# Patient Record
Sex: FEMALE | Race: WHITE | NOT HISPANIC OR LATINO | Employment: OTHER | URBAN - METROPOLITAN AREA
[De-identification: names, ages, dates, MRNs, and addresses within clinical notes are randomized per-mention and may not be internally consistent; named-entity substitution may affect disease eponyms.]

---

## 2018-08-21 ENCOUNTER — HOSPITAL ENCOUNTER (INPATIENT)
Facility: HOSPITAL | Age: 83
LOS: 3 days | Discharge: HOME | End: 2018-08-24
Attending: EMERGENCY MEDICINE | Admitting: HOSPITALIST

## 2018-08-21 ENCOUNTER — APPOINTMENT (OUTPATIENT)
Dept: GENERAL RADIOLOGY | Facility: HOSPITAL | Age: 83
End: 2018-08-21

## 2018-08-21 ENCOUNTER — APPOINTMENT (OUTPATIENT)
Dept: CT IMAGING | Facility: HOSPITAL | Age: 83
End: 2018-08-21

## 2018-08-21 DIAGNOSIS — R29.90 STROKE-LIKE SYMPTOMS: Primary | ICD-10-CM

## 2018-08-21 LAB
ALBUMIN SERPL-MCNC: 4.5 G/DL (ref 3.5–5.2)
ALBUMIN/GLOB SERPL: 1.8 G/DL
ALP SERPL-CCNC: 131 U/L (ref 39–117)
ALT SERPL W P-5'-P-CCNC: 12 U/L (ref 1–33)
ANION GAP SERPL CALCULATED.3IONS-SCNC: 13.8 MMOL/L
AST SERPL-CCNC: 19 U/L (ref 1–32)
BACTERIA UR QL AUTO: NORMAL /HPF
BASOPHILS # BLD AUTO: 0.01 10*3/MM3 (ref 0–0.2)
BASOPHILS NFR BLD AUTO: 0.2 % (ref 0–1.5)
BILIRUB SERPL-MCNC: 0.6 MG/DL (ref 0.1–1.2)
BILIRUB UR QL STRIP: NEGATIVE
BUN BLD-MCNC: 5 MG/DL (ref 8–23)
BUN/CREAT SERPL: 6.8 (ref 7–25)
CALCIUM SPEC-SCNC: 9.5 MG/DL (ref 8.6–10.5)
CHLORIDE SERPL-SCNC: 101 MMOL/L (ref 98–107)
CLARITY UR: CLEAR
CO2 SERPL-SCNC: 24.2 MMOL/L (ref 22–29)
COLOR UR: YELLOW
CREAT BLD-MCNC: 0.74 MG/DL (ref 0.57–1)
DEPRECATED RDW RBC AUTO: 42.8 FL (ref 37–54)
EOSINOPHIL # BLD AUTO: 0.01 10*3/MM3 (ref 0–0.7)
EOSINOPHIL NFR BLD AUTO: 0.2 % (ref 0.3–6.2)
ERYTHROCYTE [DISTWIDTH] IN BLOOD BY AUTOMATED COUNT: 12.7 % (ref 11.7–13)
GFR SERPL CREATININE-BSD FRML MDRD: 75 ML/MIN/1.73
GLOBULIN UR ELPH-MCNC: 2.5 GM/DL
GLUCOSE BLD-MCNC: 92 MG/DL (ref 65–99)
GLUCOSE UR STRIP-MCNC: NEGATIVE MG/DL
HCT VFR BLD AUTO: 41.5 % (ref 35.6–45.5)
HGB BLD-MCNC: 13.7 G/DL (ref 11.9–15.5)
HGB UR QL STRIP.AUTO: NEGATIVE
HYALINE CASTS UR QL AUTO: NORMAL /LPF
IMM GRANULOCYTES # BLD: 0.01 10*3/MM3 (ref 0–0.03)
IMM GRANULOCYTES NFR BLD: 0.2 % (ref 0–0.5)
KETONES UR QL STRIP: ABNORMAL
LEUKOCYTE ESTERASE UR QL STRIP.AUTO: ABNORMAL
LYMPHOCYTES # BLD AUTO: 1.52 10*3/MM3 (ref 0.9–4.8)
LYMPHOCYTES NFR BLD AUTO: 23.9 % (ref 19.6–45.3)
MCH RBC QN AUTO: 30.3 PG (ref 26.9–32)
MCHC RBC AUTO-ENTMCNC: 33 G/DL (ref 32.4–36.3)
MCV RBC AUTO: 91.8 FL (ref 80.5–98.2)
MONOCYTES # BLD AUTO: 0.61 10*3/MM3 (ref 0.2–1.2)
MONOCYTES NFR BLD AUTO: 9.6 % (ref 5–12)
NEUTROPHILS # BLD AUTO: 4.21 10*3/MM3 (ref 1.9–8.1)
NEUTROPHILS NFR BLD AUTO: 66.1 % (ref 42.7–76)
NITRITE UR QL STRIP: NEGATIVE
NT-PROBNP SERPL-MCNC: 161.6 PG/ML (ref 0–1800)
PH UR STRIP.AUTO: 6.5 [PH] (ref 5–8)
PLATELET # BLD AUTO: 261 10*3/MM3 (ref 140–500)
PMV BLD AUTO: 10.6 FL (ref 6–12)
POTASSIUM BLD-SCNC: 4.2 MMOL/L (ref 3.5–5.2)
PROT SERPL-MCNC: 7 G/DL (ref 6–8.5)
PROT UR QL STRIP: NEGATIVE
RBC # BLD AUTO: 4.52 10*6/MM3 (ref 3.9–5.2)
RBC # UR: NORMAL /HPF
REF LAB TEST METHOD: NORMAL
SODIUM BLD-SCNC: 139 MMOL/L (ref 136–145)
SP GR UR STRIP: <=1.005 (ref 1–1.03)
SQUAMOUS #/AREA URNS HPF: NORMAL /HPF
TROPONIN T SERPL-MCNC: <0.01 NG/ML (ref 0–0.03)
UROBILINOGEN UR QL STRIP: ABNORMAL
WBC NRBC COR # BLD: 6.36 10*3/MM3 (ref 4.5–10.7)
WBC UR QL AUTO: NORMAL /HPF

## 2018-08-21 PROCEDURE — 72125 CT NECK SPINE W/O DYE: CPT

## 2018-08-21 PROCEDURE — G0378 HOSPITAL OBSERVATION PER HR: HCPCS

## 2018-08-21 PROCEDURE — 93005 ELECTROCARDIOGRAM TRACING: CPT | Performed by: NURSE PRACTITIONER

## 2018-08-21 PROCEDURE — 80053 COMPREHEN METABOLIC PANEL: CPT | Performed by: NURSE PRACTITIONER

## 2018-08-21 PROCEDURE — 81001 URINALYSIS AUTO W/SCOPE: CPT | Performed by: NURSE PRACTITIONER

## 2018-08-21 PROCEDURE — 93010 ELECTROCARDIOGRAM REPORT: CPT | Performed by: INTERNAL MEDICINE

## 2018-08-21 PROCEDURE — 85025 COMPLETE CBC W/AUTO DIFF WBC: CPT | Performed by: NURSE PRACTITIONER

## 2018-08-21 PROCEDURE — 70450 CT HEAD/BRAIN W/O DYE: CPT

## 2018-08-21 PROCEDURE — 99284 EMERGENCY DEPT VISIT MOD MDM: CPT

## 2018-08-21 PROCEDURE — 83880 ASSAY OF NATRIURETIC PEPTIDE: CPT | Performed by: NURSE PRACTITIONER

## 2018-08-21 PROCEDURE — 84484 ASSAY OF TROPONIN QUANT: CPT | Performed by: NURSE PRACTITIONER

## 2018-08-21 PROCEDURE — 71046 X-RAY EXAM CHEST 2 VIEWS: CPT

## 2018-08-21 RX ORDER — PANTOPRAZOLE SODIUM 40 MG/1
40 TABLET, DELAYED RELEASE ORAL
Status: DISCONTINUED | OUTPATIENT
Start: 2018-08-22 | End: 2018-08-24 | Stop reason: HOSPADM

## 2018-08-21 RX ORDER — ATORVASTATIN CALCIUM 80 MG/1
80 TABLET, FILM COATED ORAL NIGHTLY
Status: DISCONTINUED | OUTPATIENT
Start: 2018-08-22 | End: 2018-08-22

## 2018-08-21 RX ORDER — AMLODIPINE BESYLATE 5 MG/1
5 TABLET ORAL DAILY
COMMUNITY
End: 2018-08-24 | Stop reason: HOSPADM

## 2018-08-21 RX ORDER — SODIUM CHLORIDE 0.9 % (FLUSH) 0.9 %
10 SYRINGE (ML) INJECTION AS NEEDED
Status: DISCONTINUED | OUTPATIENT
Start: 2018-08-21 | End: 2018-08-24 | Stop reason: HOSPADM

## 2018-08-21 RX ORDER — ASPIRIN 325 MG
325 TABLET ORAL DAILY
Status: DISCONTINUED | OUTPATIENT
Start: 2018-08-22 | End: 2018-08-23

## 2018-08-21 RX ORDER — FLUTICASONE PROPIONATE 50 MCG
2 SPRAY, SUSPENSION (ML) NASAL DAILY
Status: DISCONTINUED | OUTPATIENT
Start: 2018-08-22 | End: 2018-08-24 | Stop reason: HOSPADM

## 2018-08-21 RX ORDER — ACETAMINOPHEN 325 MG/1
650 TABLET ORAL EVERY 6 HOURS PRN
Status: DISCONTINUED | OUTPATIENT
Start: 2018-08-21 | End: 2018-08-24

## 2018-08-21 RX ORDER — AMLODIPINE BESYLATE 5 MG/1
5 TABLET ORAL
Status: DISCONTINUED | OUTPATIENT
Start: 2018-08-22 | End: 2018-08-23

## 2018-08-21 RX ORDER — LISINOPRIL 40 MG/1
40 TABLET ORAL EVERY 12 HOURS SCHEDULED
Status: DISCONTINUED | OUTPATIENT
Start: 2018-08-22 | End: 2018-08-24 | Stop reason: HOSPADM

## 2018-08-21 RX ORDER — MELATONIN
1000 DAILY
COMMUNITY
End: 2020-02-24

## 2018-08-21 RX ORDER — FLUTICASONE PROPIONATE 50 MCG
2 SPRAY, SUSPENSION (ML) NASAL DAILY PRN
COMMUNITY

## 2018-08-21 RX ORDER — MELATONIN
1000 DAILY
Status: DISCONTINUED | OUTPATIENT
Start: 2018-08-22 | End: 2018-08-24 | Stop reason: HOSPADM

## 2018-08-21 RX ORDER — LISINOPRIL 40 MG/1
40 TABLET ORAL 2 TIMES DAILY
COMMUNITY

## 2018-08-21 RX ORDER — RANITIDINE 300 MG/1
300 TABLET ORAL DAILY
COMMUNITY
End: 2020-02-24

## 2018-08-21 RX ORDER — FAMOTIDINE 40 MG/1
40 TABLET, FILM COATED ORAL DAILY
Status: DISCONTINUED | OUTPATIENT
Start: 2018-08-22 | End: 2018-08-22

## 2018-08-21 RX ORDER — SODIUM CHLORIDE 9 MG/ML
75 INJECTION, SOLUTION INTRAVENOUS CONTINUOUS
Status: DISCONTINUED | OUTPATIENT
Start: 2018-08-22 | End: 2018-08-24

## 2018-08-21 NOTE — ED TRIAGE NOTES
PT C/O NECK PAIN FOR SEVERAL MONTHS, RIGHT ARM NUMBNESS AND ARM WENT LIMP LASTING 2-3 HOURS AT 2100 LAST NIGHT, STILL HAVING RIGHT ARM NUMBNESS, WEAKNESS RESOLVED. EQUAL  NOTED, NO FACIAL DROOP OR SLURRED SPEECH NOTED. NO OTHER NEURO DEFICITS PRESENT IN TRIAGE AT THIS TIME

## 2018-08-21 NOTE — ED NOTES
Pt states that around 2100 last night her right arm was numb and unable to move it. States lasted approx 2hrs. Reports continued numbness today. Pt reports nausea. Speech clear, no facial droop, eyes perrla. Reports increased sensitivity to right arm. Distal pulses strong     Rose Barry, RN  08/21/18 2914

## 2018-08-21 NOTE — ED PROVIDER NOTES
"EMERGENCY DEPARTMENT ENCOUNTER    Room Number:  31/31  Date seen:  8/21/2018  Time seen: 6:13 PM  PCP: Opal Marina MD    HPI:  Chief complaint: RUE numbness  Context:Katja Klein is a 83 y.o. female who presents to the ED with c/o right sided upper extremity numbness \"my arm was lame\" that occurred around 2100 last night as well as today and she also had some numbness and tingling of arm/hand today.  She had no other complaints regarding visual changes, dysarthria, thinking issues.  She also endorses right sided neck pain \"for a few months\" even though she has not discussed with her PCP and daughter mentions congestion and sinus problems.     Timing: on and off  Duration: since 2100 last night  Location:R  Radiation: right arm  Quality: numbness on and off  Intensity/Severity: mild  Associated Symptoms: no   Aggravating Factors: no   Alleviating Factors: no   Previous Episodes: no   Treatment before arrival: took  mg today    MEDICAL RECORD REVIEW      ALLERGIES  Codeine    PAST MEDICAL HISTORY  Active Ambulatory Problems     Diagnosis Date Noted   • No Active Ambulatory Problems     Resolved Ambulatory Problems     Diagnosis Date Noted   • No Resolved Ambulatory Problems     Past Medical History:   Diagnosis Date   • Hypertension        PAST SURGICAL HISTORY  Past Surgical History:   Procedure Laterality Date   • CARPAL TUNNEL RELEASE     • CHOLECYSTECTOMY     • HYSTERECTOMY         FAMILY HISTORY  History reviewed. No pertinent family history.    SOCIAL HISTORY  Social History     Social History   • Marital status:      Spouse name: N/A   • Number of children: N/A   • Years of education: N/A     Occupational History   • Not on file.     Social History Main Topics   • Smoking status: Former Smoker   • Smokeless tobacco: Never Used   • Alcohol use No   • Drug use: No   • Sexual activity: Not on file     Other Topics Concern   • Not on file     Social History Narrative   • No narrative on file "       REVIEW OF SYSTEMS  Review of Systems   Constitutional: Positive for fatigue. Negative for activity change, appetite change, diaphoresis and fever.   HENT: Positive for sinus pressure. Negative for trouble swallowing.    Eyes: Negative for visual disturbance.   Respiratory: Negative for cough, chest tightness, shortness of breath and wheezing.    Cardiovascular: Negative for chest pain, palpitations and leg swelling.   Gastrointestinal: Negative for abdominal pain, diarrhea, nausea and vomiting.   Genitourinary: Negative for dysuria.   Musculoskeletal: Positive for neck pain. Negative for arthralgias, back pain, gait problem and neck stiffness.   Skin: Negative for rash.   Neurological: Positive for numbness (rue) and headaches. Negative for dizziness, tremors, syncope, facial asymmetry, speech difficulty and light-headedness.       PHYSICAL EXAM  ED Triage Vitals   Temp Heart Rate Resp BP SpO2   08/21/18 1636 08/21/18 1636 08/21/18 1636 08/21/18 1711 08/21/18 1636   98.1 °F (36.7 °C) 90 16 143/82 95 %      Temp src Heart Rate Source Patient Position BP Location FiO2 (%)   08/21/18 1636 08/21/18 1636 -- -- --   Tympanic Monitor        Physical Exam   Constitutional: She is oriented to person, place, and time and well-developed, well-nourished, and in no distress. She appears to not be writhing in pain and not dehydrated. She appears healthy. She does not have a sickly appearance. No distress.   HENT:   Head: Normocephalic and atraumatic.   Mouth/Throat: Uvula is midline, oropharynx is clear and moist and mucous membranes are normal. No trismus in the jaw.   Eyes: Pupils are equal, round, and reactive to light. EOM are normal.   Neck: Normal range of motion. Neck supple. No spinous process tenderness and no muscular tenderness present. Normal range of motion present. No Brudzinski's sign and no Kernig's sign noted.   Cardiovascular: Normal rate, regular rhythm, S1 normal, S2 normal and normal heart sounds.  Exam  reveals no gallop and no friction rub.    No murmur heard.  Pulmonary/Chest: Effort normal and breath sounds normal. She has no decreased breath sounds. She has no wheezes. She has no rhonchi. She has no rales.   Abdominal: Soft. Normal appearance and bowel sounds are normal. There is no tenderness. There is no rebound and no guarding.   Musculoskeletal: Normal range of motion.   Neurological: She is alert and oriented to person, place, and time.   Cranial nerves 2-12 intact as tested.  Sensation intact.  5/5 strength in all extremities.  Normal cerebellar testing.  No drift in any extremity.  No dysarthria.  No aphasia.  No neglect/extinction.        Skin: Skin is warm, dry and intact. She is not diaphoretic. There is pallor.   Psychiatric: Affect and judgment normal.   Nursing note and vitals reviewed.      LAB RESULTS  Recent Results (from the past 24 hour(s))   Comprehensive Metabolic Panel    Collection Time: 08/21/18  6:39 PM   Result Value Ref Range    Glucose 92 65 - 99 mg/dL    BUN 5 (L) 8 - 23 mg/dL    Creatinine 0.74 0.57 - 1.00 mg/dL    Sodium 139 136 - 145 mmol/L    Potassium 4.2 3.5 - 5.2 mmol/L    Chloride 101 98 - 107 mmol/L    CO2 24.2 22.0 - 29.0 mmol/L    Calcium 9.5 8.6 - 10.5 mg/dL    Total Protein 7.0 6.0 - 8.5 g/dL    Albumin 4.50 3.50 - 5.20 g/dL    ALT (SGPT) 12 1 - 33 U/L    AST (SGOT) 19 1 - 32 U/L    Alkaline Phosphatase 131 (H) 39 - 117 U/L    Total Bilirubin 0.6 0.1 - 1.2 mg/dL    eGFR Non African Amer 75 >60 mL/min/1.73    Globulin 2.5 gm/dL    A/G Ratio 1.8 g/dL    BUN/Creatinine Ratio 6.8 (L) 7.0 - 25.0    Anion Gap 13.8 mmol/L   Troponin    Collection Time: 08/21/18  6:39 PM   Result Value Ref Range    Troponin T <0.010 0.000 - 0.030 ng/mL   BNP    Collection Time: 08/21/18  6:39 PM   Result Value Ref Range    proBNP 161.6 0.0-1,800.0 pg/mL   CBC Auto Differential    Collection Time: 08/21/18  6:39 PM   Result Value Ref Range    WBC 6.36 4.50 - 10.70 10*3/mm3    RBC 4.52 3.90 - 5.20  10*6/mm3    Hemoglobin 13.7 11.9 - 15.5 g/dL    Hematocrit 41.5 35.6 - 45.5 %    MCV 91.8 80.5 - 98.2 fL    MCH 30.3 26.9 - 32.0 pg    MCHC 33.0 32.4 - 36.3 g/dL    RDW 12.7 11.7 - 13.0 %    RDW-SD 42.8 37.0 - 54.0 fl    MPV 10.6 6.0 - 12.0 fL    Platelets 261 140 - 500 10*3/mm3    Neutrophil % 66.1 42.7 - 76.0 %    Lymphocyte % 23.9 19.6 - 45.3 %    Monocyte % 9.6 5.0 - 12.0 %    Eosinophil % 0.2 (L) 0.3 - 6.2 %    Basophil % 0.2 0.0 - 1.5 %    Immature Grans % 0.2 0.0 - 0.5 %    Neutrophils, Absolute 4.21 1.90 - 8.10 10*3/mm3    Lymphocytes, Absolute 1.52 0.90 - 4.80 10*3/mm3    Monocytes, Absolute 0.61 0.20 - 1.20 10*3/mm3    Eosinophils, Absolute 0.01 0.00 - 0.70 10*3/mm3    Basophils, Absolute 0.01 0.00 - 0.20 10*3/mm3    Immature Grans, Absolute 0.01 0.00 - 0.03 10*3/mm3   Urinalysis With Microscopic If Indicated (No Culture) - Urine, Clean Catch    Collection Time: 08/21/18  6:40 PM   Result Value Ref Range    Color, UA Yellow Yellow, Straw    Appearance, UA Clear Clear    pH, UA 6.5 5.0 - 8.0    Specific Gravity, UA <=1.005 1.005 - 1.030    Glucose, UA Negative Negative    Ketones, UA Trace (A) Negative    Bilirubin, UA Negative Negative    Blood, UA Negative Negative    Protein, UA Negative Negative    Leuk Esterase, UA Trace (A) Negative    Nitrite, UA Negative Negative    Urobilinogen, UA 0.2 E.U./dL 0.2 - 1.0 E.U./dL   Urinalysis, Microscopic Only - Urine, Clean Catch    Collection Time: 08/21/18  6:40 PM   Result Value Ref Range    RBC, UA None Seen None Seen, 0-2 /HPF    WBC, UA 0-2 None Seen, 0-2 /HPF    Bacteria, UA None Seen None Seen /HPF    Squamous Epithelial Cells, UA 0-2 None Seen, 0-2 /HPF    Hyaline Casts, UA None Seen None Seen /LPF    Methodology Manual Light Microscopy        I ordered the above labs and reviewed the results    RADIOLOGY  CT Head Without Contrast         CT Cervical Spine Without Contrast         XR Chest 2 View   Final Result        CT brain with no acute findings, CT  "cervical with some DJD noted.     MEDICATIONS GIVEN IN ER  Medications   sodium chloride 0.9 % flush 10 mL (not administered)       EKG  Interpreted by ED Physician    PROCEDURES  Procedures    COURSE & MEDICAL DECISION MAKING  Pertinent Labs and Imaging studies that were ordered and reviewed are noted above.  Results were reviewed/discussed with the patient and they were also made aware of online access.  Pt also made aware that some labs, such as cultures, will not be resulted during ER visit and follow up with PMD is necessary.     PROGRESS AND CONSULTS    Progress Notes:        1930:  Pt is not a candidate for TPA as NIH is zero and symptoms started last night at 2100.    1950:  Reviewed pt's history and workup with Dr. Salamanca.  After a bedside evaluation, Dr. Salamanca agrees with the plan of care.    1955: Spoke with Dr. Morales who is agreeable to admit for further stroke workup. Based on the patient's lab findings and presenting symptoms, the doctor and I feel it is appropriate to admit the patient for further management, evaluation, and treatment.  I have discussed this with the admitting team.  I have also discussed this with the patient/family.  They are in agreement with admission.        Disposition vitals:  /82   Pulse 90   Temp 98.1 °F (36.7 °C) (Tympanic)   Resp 16   Ht 165.1 cm (65\")   Wt 80.8 kg (178 lb 1.6 oz)   SpO2 95%   BMI 29.64 kg/m²       DIAGNOSIS  Final diagnoses:   Stroke-like symptoms                Alina Ria Pennye, APRN  08/21/18 2008    "

## 2018-08-21 NOTE — ED PROVIDER NOTES
"MD ATTESTATION NOTE    The RAJENDRA and I have discussed this patient's history, physical exam, and treatment plan.  I have reviewed the documentation and personally had a face to face interaction with the patient. I affirm the documentation and agree with the treatment and plan.  The attached note describes my personal findings.          Pt reports that she has h/o recurrent neck pain. Pt reports that at about 21:00 last night, pt developed intermittent episodes of \"numbness/tingling\" of the right arm and right leg. Pt reports that during initial development of the numbness/tingling, pt also had right arm weakness, which has now resolved. Pt reports that she continues to have intermittent episodes of \"tingling\" of the right arm/right leg. Pt denies speech/visual difficulties, difficulty swallowing, headache, neck stiffness, chest pain, dyspnea, palpitations, abdominal pain, nausea, and vomiting. Pt has taken ASA today.     On physical exam, pt is alert and oriented x3. Pt reports subjective numbness of the right arm. Heart sounds regular        EKG           EKG time: 18:39  Rhythm/Rate: NSR rate 76  P waves and [place]: Normal P waves  QRS, axis: Normal QRS   ST and T waves: Normal ST     Interpreted Contemporaneously by me, independently viewed  No old EKG is available for comparison        Pt is not a candidate for tPA as pt's symptoms began last night. Pt's CT Head is negative acute. Pt's CT C-Spine shows chronic degenerative changes. Pt will be admitted to the hospitalist for further evaluation and management/treatment.         Documentation assistance provided by Benjamin Marrero. Information recorded by the scribe was done at my direction and has been verified and validated by me.     Entered by Benjamin Marrero, acting as scribe for Dr. Cheikh MD.        Benjamin Marrero  08/1935       Ish Salamanca MD  08/22/18 0027    "

## 2018-08-22 ENCOUNTER — APPOINTMENT (OUTPATIENT)
Dept: CARDIOLOGY | Facility: HOSPITAL | Age: 83
End: 2018-08-22
Attending: HOSPITALIST

## 2018-08-22 ENCOUNTER — APPOINTMENT (OUTPATIENT)
Dept: MRI IMAGING | Facility: HOSPITAL | Age: 83
End: 2018-08-22

## 2018-08-22 ENCOUNTER — APPOINTMENT (OUTPATIENT)
Dept: CT IMAGING | Facility: HOSPITAL | Age: 83
End: 2018-08-22

## 2018-08-22 LAB
ANION GAP SERPL CALCULATED.3IONS-SCNC: 12.6 MMOL/L
BASOPHILS # BLD AUTO: 0.01 10*3/MM3 (ref 0–0.2)
BASOPHILS NFR BLD AUTO: 0.2 % (ref 0–1.5)
BUN BLD-MCNC: 7 MG/DL (ref 8–23)
BUN/CREAT SERPL: 9 (ref 7–25)
CALCIUM SPEC-SCNC: 9 MG/DL (ref 8.6–10.5)
CHLORIDE SERPL-SCNC: 104 MMOL/L (ref 98–107)
CHOLEST SERPL-MCNC: 155 MG/DL (ref 0–200)
CO2 SERPL-SCNC: 25.4 MMOL/L (ref 22–29)
CREAT BLD-MCNC: 0.78 MG/DL (ref 0.57–1)
DEPRECATED RDW RBC AUTO: 43.6 FL (ref 37–54)
EOSINOPHIL # BLD AUTO: 0.05 10*3/MM3 (ref 0–0.7)
EOSINOPHIL NFR BLD AUTO: 0.9 % (ref 0.3–6.2)
ERYTHROCYTE [DISTWIDTH] IN BLOOD BY AUTOMATED COUNT: 12.9 % (ref 11.7–13)
GFR SERPL CREATININE-BSD FRML MDRD: 71 ML/MIN/1.73
GLUCOSE BLD-MCNC: 88 MG/DL (ref 65–99)
HBA1C MFR BLD: 5.3 % (ref 4.8–5.6)
HCT VFR BLD AUTO: 39.4 % (ref 35.6–45.5)
HDLC SERPL-MCNC: 68 MG/DL (ref 40–60)
HGB BLD-MCNC: 12.5 G/DL (ref 11.9–15.5)
IMM GRANULOCYTES # BLD: 0.02 10*3/MM3 (ref 0–0.03)
IMM GRANULOCYTES NFR BLD: 0.4 % (ref 0–0.5)
LDLC SERPL CALC-MCNC: 73 MG/DL (ref 0–100)
LDLC/HDLC SERPL: 1.08 {RATIO}
LYMPHOCYTES # BLD AUTO: 1.79 10*3/MM3 (ref 0.9–4.8)
LYMPHOCYTES NFR BLD AUTO: 33.8 % (ref 19.6–45.3)
MCH RBC QN AUTO: 29.6 PG (ref 26.9–32)
MCHC RBC AUTO-ENTMCNC: 31.7 G/DL (ref 32.4–36.3)
MCV RBC AUTO: 93.4 FL (ref 80.5–98.2)
MONOCYTES # BLD AUTO: 0.62 10*3/MM3 (ref 0.2–1.2)
MONOCYTES NFR BLD AUTO: 11.7 % (ref 5–12)
NEUTROPHILS # BLD AUTO: 2.81 10*3/MM3 (ref 1.9–8.1)
NEUTROPHILS NFR BLD AUTO: 53 % (ref 42.7–76)
PLATELET # BLD AUTO: 240 10*3/MM3 (ref 140–500)
PMV BLD AUTO: 10.3 FL (ref 6–12)
POTASSIUM BLD-SCNC: 4 MMOL/L (ref 3.5–5.2)
RBC # BLD AUTO: 4.22 10*6/MM3 (ref 3.9–5.2)
SODIUM BLD-SCNC: 142 MMOL/L (ref 136–145)
TRIGL SERPL-MCNC: 68 MG/DL (ref 0–150)
TSH SERPL DL<=0.05 MIU/L-ACNC: 2.81 MIU/ML (ref 0.27–4.2)
VIT B12 BLD-MCNC: 160 PG/ML (ref 211–946)
VLDLC SERPL-MCNC: 13.6 MG/DL (ref 5–40)
WBC NRBC COR # BLD: 5.3 10*3/MM3 (ref 4.5–10.7)

## 2018-08-22 PROCEDURE — 70553 MRI BRAIN STEM W/O & W/DYE: CPT

## 2018-08-22 PROCEDURE — 85025 COMPLETE CBC W/AUTO DIFF WBC: CPT | Performed by: HOSPITALIST

## 2018-08-22 PROCEDURE — 72156 MRI NECK SPINE W/O & W/DYE: CPT

## 2018-08-22 PROCEDURE — 70498 CT ANGIOGRAPHY NECK: CPT

## 2018-08-22 PROCEDURE — 0 IOPAMIDOL PER 1 ML: Performed by: HOSPITALIST

## 2018-08-22 PROCEDURE — 80061 LIPID PANEL: CPT | Performed by: PSYCHIATRY & NEUROLOGY

## 2018-08-22 PROCEDURE — 93306 TTE W/DOPPLER COMPLETE: CPT

## 2018-08-22 PROCEDURE — 83036 HEMOGLOBIN GLYCOSYLATED A1C: CPT | Performed by: PSYCHIATRY & NEUROLOGY

## 2018-08-22 PROCEDURE — A9577 INJ MULTIHANCE: HCPCS | Performed by: HOSPITALIST

## 2018-08-22 PROCEDURE — G0378 HOSPITAL OBSERVATION PER HR: HCPCS

## 2018-08-22 PROCEDURE — 82607 VITAMIN B-12: CPT | Performed by: PSYCHIATRY & NEUROLOGY

## 2018-08-22 PROCEDURE — 93306 TTE W/DOPPLER COMPLETE: CPT | Performed by: INTERNAL MEDICINE

## 2018-08-22 PROCEDURE — 70496 CT ANGIOGRAPHY HEAD: CPT

## 2018-08-22 PROCEDURE — 0 GADOBENATE DIMEGLUMINE 529 MG/ML SOLUTION: Performed by: HOSPITALIST

## 2018-08-22 PROCEDURE — 80048 BASIC METABOLIC PNL TOTAL CA: CPT | Performed by: HOSPITALIST

## 2018-08-22 PROCEDURE — 99204 OFFICE O/P NEW MOD 45 MIN: CPT | Performed by: PSYCHIATRY & NEUROLOGY

## 2018-08-22 PROCEDURE — 25010000002 LORAZEPAM PER 2 MG: Performed by: HOSPITALIST

## 2018-08-22 PROCEDURE — 84443 ASSAY THYROID STIM HORMONE: CPT | Performed by: PSYCHIATRY & NEUROLOGY

## 2018-08-22 RX ORDER — ATORVASTATIN CALCIUM 20 MG/1
40 TABLET, FILM COATED ORAL NIGHTLY
Status: DISCONTINUED | OUTPATIENT
Start: 2018-08-22 | End: 2018-08-24 | Stop reason: HOSPADM

## 2018-08-22 RX ORDER — LORAZEPAM 2 MG/ML
0.5 INJECTION INTRAMUSCULAR ONCE
Status: COMPLETED | OUTPATIENT
Start: 2018-08-22 | End: 2018-08-22

## 2018-08-22 RX ADMIN — LORAZEPAM 0.5 MG: 2 INJECTION INTRAMUSCULAR; INTRAVENOUS at 18:27

## 2018-08-22 RX ADMIN — LISINOPRIL 40 MG: 40 TABLET ORAL at 20:54

## 2018-08-22 RX ADMIN — SODIUM CHLORIDE 75 ML/HR: 9 INJECTION, SOLUTION INTRAVENOUS at 01:29

## 2018-08-22 RX ADMIN — VITAMIN D, TAB 1000IU (100/BT) 1000 UNITS: 25 TAB at 09:35

## 2018-08-22 RX ADMIN — FAMOTIDINE 40 MG: 40 TABLET, FILM COATED ORAL at 09:35

## 2018-08-22 RX ADMIN — PANTOPRAZOLE SODIUM 40 MG: 40 TABLET, DELAYED RELEASE ORAL at 06:22

## 2018-08-22 RX ADMIN — ATORVASTATIN CALCIUM 80 MG: 80 TABLET, FILM COATED ORAL at 01:29

## 2018-08-22 RX ADMIN — AMLODIPINE BESYLATE 5 MG: 5 TABLET ORAL at 09:35

## 2018-08-22 RX ADMIN — ASPIRIN 325 MG: 325 TABLET ORAL at 09:35

## 2018-08-22 RX ADMIN — IOPAMIDOL 90 ML: 755 INJECTION, SOLUTION INTRAVENOUS at 10:29

## 2018-08-22 RX ADMIN — GADOBENATE DIMEGLUMINE 16 ML: 529 INJECTION, SOLUTION INTRAVENOUS at 20:00

## 2018-08-22 RX ADMIN — ATORVASTATIN CALCIUM 40 MG: 20 TABLET, FILM COATED ORAL at 20:55

## 2018-08-22 RX ADMIN — LISINOPRIL 40 MG: 40 TABLET ORAL at 01:27

## 2018-08-22 RX ADMIN — LISINOPRIL 40 MG: 40 TABLET ORAL at 09:35

## 2018-08-22 NOTE — SIGNIFICANT NOTE
08/22/18 1315   Rehab Time/Intention   Evaluation Not Performed patient unavailable for evaluation  (off floor 1235 and off floor still cardio 1315)   Rehab Treatment   Discipline occupational therapist   Recommendation   OT - Next Appointment 08/23/18

## 2018-08-22 NOTE — CONSULTS
Encounter Date: 8/22/2018    CHIEF COMPLAINT: Right arm numbness and numbness    Patient Active Problem List   Diagnosis   • Stroke-like symptoms       PRESENT ILLNESS:    Portions of this notes is copied from previous physician encounters, reviewed and edited appropriately.    Background:     Katja Klein is a 83 y.o. female h/o htn now dmitted with right arm weakness on Monday lastd couple of hours and since then numbness in the right arm from shoulder down. Also has some chronic neck pain which has worsened in the last few weeks. No previous tia or stroke like symptoms.  Patient is not on any antiplatelet therapy.    Review of systems   No neck stiffness  No photophobia  All systems reviewed and are negative.         Past Medical History:   Diagnosis Date   • Hypertension        Past Surgical History:   Procedure Laterality Date   • CARPAL TUNNEL RELEASE     • CHOLECYSTECTOMY     • HYSTERECTOMY         Current Facility-Administered Medications   Medication Dose Route Frequency Provider Last Rate Last Dose   • acetaminophen (TYLENOL) tablet 650 mg  650 mg Oral Q6H PRN Matt Morales MD       • amLODIPine (NORVASC) tablet 5 mg  5 mg Oral Q24H Matt Morales MD       • aspirin tablet 325 mg  325 mg Oral Daily Matt Morales MD       • atorvastatin (LIPITOR) tablet 80 mg  80 mg Oral Nightly Matt Morales MD   80 mg at 08/22/18 0129   • cholecalciferol (VITAMIN D3) tablet 1,000 Units  1,000 Units Oral Daily Matt Morales MD       • famotidine (PEPCID) tablet 40 mg  40 mg Oral Daily Matt Morales MD       • fluticasone (FLONASE) 50 MCG/ACT nasal spray 2 spray  2 spray Each Nare Daily Matt Morales MD       • lisinopril (PRINIVIL,ZESTRIL) tablet 40 mg  40 mg Oral Q12H Matt Morales MD   40 mg at 08/22/18 0127   • pantoprazole (PROTONIX) EC tablet 40 mg  40 mg Oral Q AM Matt Morales MD   40 mg at 08/22/18 0622   • sodium chloride 0.9 % flush 10 mL  10 mL Intravenous PRN Ria Fuller APRN       • sodium chloride  0.9 % infusion  75 mL/hr Intravenous Continuous Matt Morales MD 75 mL/hr at 08/22/18 0555 75 mL/hr at 08/22/18 0555       Allergies   Allergen Reactions   • Codeine Nausea Only       Social History     Social History   • Marital status:      Spouse name: N/A   • Number of children: N/A   • Years of education: N/A     Occupational History   • Not on file.     Social History Main Topics   • Smoking status: Former Smoker   • Smokeless tobacco: Never Used   • Alcohol use No   • Drug use: No   • Sexual activity: Not on file     Other Topics Concern   • Not on file     Social History Narrative   • No narrative on file       History reviewed. No pertinent family history.    No family status information on file.       No current facility-administered medications on file prior to encounter.      No current outpatient prescriptions on file prior to encounter.           PHYSICAL EXAMINATION:    Vitals: Patient Vitals for the past 4 hrs:   BP Temp Temp src Pulse SpO2   08/22/18 0541 111/60 97.7 °F (36.5 °C) Oral 75 95 %     Temp:  [97.6 °F (36.4 °C)-98.1 °F (36.7 °C)] 97.7 °F (36.5 °C)  Heart Rate:  [65-90] 75  Resp:  [15-16] 15  BP: (111-143)/(60-89) 111/60    General:  pleasant in no acute distress.  HEENT: No pallor or icterus. Neck supple. No Carotid bruits.  Heart: S1 and S2 normal with regular rhythm.  No murmur.       GENERAL NEUROLOGIC EXAM     Mental Status: Awake alert and oriented to person place and time. Language is normal for comprehension, repetition, naming and fluency. Recent and remote memory were normal.  Attention span and concentration were normal. Fund of knowledge was normal.      Cranial nerves:  Fundi were normal bilaterally.  Visual fields were full to confrontation.  Pupils are equal regular and reactive to light. Extraocular movements are intact. Facial sensation was normal and symmetrical bilaterally. Muscles of facial expression were strong and symmetric. Hearing to finger rub normal  bilaterally. Palate elevates symmetrically. Sternocleidomastoid and trapezius normal. The tongue protrudes midline without atrophy or fasciculations.      Motor: Normal tone and bulk with no involuntary movements or pronator drift.    Strength normal 5/5 in bilateral upper and lower extremities.     Sensation: Light touch, pin prick, vibration and joint position sense were normal in the upper and lower extremities.     Reflexes: 2+ bilaterally symmetrical with down going toes.    Coordination: finger nose and heel shin test normal bilaterally.     Gait: deferred     Labs:     Lab Results   Component Value Date    HGB 12.5 08/22/2018    HCT 39.4 08/22/2018    WBC 5.30 08/22/2018     08/22/2018     Lab Results   Component Value Date    BUN 7 (L) 08/22/2018    CALCIUM 9.0 08/22/2018     08/22/2018    K 4.0 08/22/2018     08/22/2018    CO2 25.4 08/22/2018     Lab Results   Component Value Date    ALT 12 08/21/2018    AST 19 08/21/2018    BILITOT 0.6 08/21/2018     No results found for: PHOS, MG, PROTIME, INR, APTT  No components found for: POCGLUC  No components found for: A1C  No results found for: HDL, LDL  No components found for: B12  No results found for: TSH    Lab Results (last 24 hours)     Procedure Component Value Units Date/Time    Basic Metabolic Panel [[id number]]  (Abnormal) Collected:  08/22/18 0523    Specimen:  Blood Updated:  08/22/18 0625     Glucose 88 mg/dL      BUN 7 (L) mg/dL      Creatinine 0.78 mg/dL      Sodium 142 mmol/L      Potassium 4.0 mmol/L      Chloride 104 mmol/L      CO2 25.4 mmol/L      Calcium 9.0 mg/dL      eGFR Non African Amer 71 mL/min/1.73      BUN/Creatinine Ratio 9.0     Anion Gap 12.6 mmol/L     Narrative:       The MDRD GFR formula is only valid for adults with stable renal function between ages 18 and 70.    CBC & Differential [[id number]] Collected:  08/22/18 0523    Specimen:  Blood Updated:  08/22/18 0603    Narrative:       The following orders were  created for panel order CBC & Differential.  Procedure                               Abnormality         Status                     ---------                               -----------         ------                     CBC Auto Differential[[id number]]        Abnormal            Final result                 Please view results for these tests on the individual orders.    CBC Auto Differential [[id number]]  (Abnormal) Collected:  08/22/18 0523    Specimen:  Blood Updated:  08/22/18 0603     WBC 5.30 10*3/mm3      RBC 4.22 10*6/mm3      Hemoglobin 12.5 g/dL      Hematocrit 39.4 %      MCV 93.4 fL      MCH 29.6 pg      MCHC 31.7 (L) g/dL      RDW 12.9 %      RDW-SD 43.6 fl      MPV 10.3 fL      Platelets 240 10*3/mm3      Neutrophil % 53.0 %      Lymphocyte % 33.8 %      Monocyte % 11.7 %      Eosinophil % 0.9 %      Basophil % 0.2 %      Immature Grans % 0.4 %      Neutrophils, Absolute 2.81 10*3/mm3      Lymphocytes, Absolute 1.79 10*3/mm3      Monocytes, Absolute 0.62 10*3/mm3      Eosinophils, Absolute 0.05 10*3/mm3      Basophils, Absolute 0.01 10*3/mm3      Immature Grans, Absolute 0.02 10*3/mm3     Urinalysis, Microscopic Only - Urine, Clean Catch [[id number]] Collected:  08/21/18 1840    Specimen:  Urine from Urine, Clean Catch Updated:  08/21/18 1915     RBC, UA None Seen /HPF      WBC, UA 0-2 /HPF      Bacteria, UA None Seen /HPF      Squamous Epithelial Cells, UA 0-2 /HPF      Hyaline Casts, UA None Seen /LPF      Methodology Manual Light Microscopy    Comprehensive Metabolic Panel [[id number]]  (Abnormal) Collected:  08/21/18 1839    Specimen:  Blood Updated:  08/21/18 1912     Glucose 92 mg/dL      BUN 5 (L) mg/dL      Creatinine 0.74 mg/dL      Sodium 139 mmol/L      Potassium 4.2 mmol/L      Chloride 101 mmol/L      CO2 24.2 mmol/L      Calcium 9.5 mg/dL      Total Protein 7.0 g/dL      Albumin 4.50 g/dL      ALT (SGPT) 12 U/L      AST (SGOT) 19 U/L      Comment: Specimen hemolyzed.  Results may be affected.         Alkaline Phosphatase 131 (H) U/L      Total Bilirubin 0.6 mg/dL      eGFR Non African Amer 75 mL/min/1.73      Globulin 2.5 gm/dL      A/G Ratio 1.8 g/dL      BUN/Creatinine Ratio 6.8 (L)     Anion Gap 13.8 mmol/L     Narrative:       The MDRD GFR formula is only valid for adults with stable renal function between ages 18 and 70.    Troponin [[id number]]  (Normal) Collected:  08/21/18 1839    Specimen:  Blood Updated:  08/21/18 1911     Troponin T <0.010 ng/mL     Narrative:       Troponin T Reference Ranges:  Less than 0.03 ng/mL:    Negative for AMI  0.03 to 0.09 ng/mL:      Indeterminant for AMI  Greater than 0.09 ng/mL: Positive for AMI    BNP [[id number]]  (Normal) Collected:  08/21/18 1839    Specimen:  Blood Updated:  08/21/18 1909     proBNP 161.6 pg/mL     Narrative:       Among patients with dyspnea, NT-proBNP is highly sensitive for the detection of acute congestive heart failure. In addition NT-proBNP of <300 pg/ml effectively rules out acute congestive heart failure with 99% negative predictive value.    Urinalysis With Microscopic If Indicated (No Culture) - Urine, Clean Catch [[id number]]  (Abnormal) Collected:  08/21/18 1840    Specimen:  Urine from Urine, Clean Catch Updated:  08/21/18 1855     Color, UA Yellow     Appearance, UA Clear     pH, UA 6.5     Specific Gravity, UA <=1.005     Glucose, UA Negative     Ketones, UA Trace (A)     Bilirubin, UA Negative     Blood, UA Negative     Protein, UA Negative     Leuk Esterase, UA Trace (A)     Nitrite, UA Negative     Urobilinogen, UA 0.2 E.U./dL    CBC & Differential [[id number]] Collected:  08/21/18 1839    Specimen:  Blood Updated:  08/21/18 1854    Narrative:       The following orders were created for panel order CBC & Differential.  Procedure                               Abnormality         Status                     ---------                               -----------         ------                     CBC Auto Differential[[id number]]         Abnormal            Final result                 Please view results for these tests on the individual orders.    CBC Auto Differential [[id number]]  (Abnormal) Collected:  18    Specimen:  Blood Updated:  18     WBC 6.36 10*3/mm3      RBC 4.52 10*6/mm3      Hemoglobin 13.7 g/dL      Hematocrit 41.5 %      MCV 91.8 fL      MCH 30.3 pg      MCHC 33.0 g/dL      RDW 12.7 %      RDW-SD 42.8 fl      MPV 10.6 fL      Platelets 261 10*3/mm3      Neutrophil % 66.1 %      Lymphocyte % 23.9 %      Monocyte % 9.6 %      Eosinophil % 0.2 (L) %      Basophil % 0.2 %      Immature Grans % 0.2 %      Neutrophils, Absolute 4.21 10*3/mm3      Lymphocytes, Absolute 1.52 10*3/mm3      Monocytes, Absolute 0.61 10*3/mm3      Eosinophils, Absolute 0.01 10*3/mm3      Basophils, Absolute 0.01 10*3/mm3      Immature Grans, Absolute 0.01 10*3/mm3           .  Imaging Results (last 24 hours)     Procedure Component Value Units Date/Time    CT Head Without Contrast [[id number]] Collected:  18     Updated:  18    Narrative:       CT SCAN OF THE BRAIN WITHOUT CONTRAST     HISTORY: Recent onset of right arm numbness.     TECHNIQUE: The CT scan was performed as an emergency procedure through  the brain without contrast. There is mild diffuse atrophy and moderate  chronic small vessel ischemic change similar to the study of 2016.  There is no evidence of acute intracranial hemorrhage or mass effect and  the visualized sinuses are clear.     CT SCAN OF THE CERVICAL SPINE     HISTORY: Neck pain and right arm numbness.     FINDINGS: The CT scan was performed as an emergency procedure through  the cervical spine and demonstrates followin. The foramen magnum appears normal. There is some degenerative  spurring involving the odontoid and anterior arch of C1. This does not  affect the spinal canal.        2. At C2-3 there is a minimal central disc bulge and slight right  lateral facet spurring. There is  no central or foraminal encroachment.  3. At C3-4 there is a slight posterior disc osteophyte complex. There is  moderate spurring of the right uncovertebral joint and right lateral  facet resulting in moderate right foraminal encroachment.  4. At C4-5 there is a mild posterior disc osteophyte complex. There is  spurring of the left and right lateral facets and right uncovertebral  joint and resulting in moderate right and slight left foraminal  encroachment.  5. At C5-6 there is a slight posterior disc osteophyte complex. There is  some spurring of the right lateral facet and right uncovertebral joint  resulting in borderline right foraminal encroachment.  6. At C6-7 there is mild posterior disc osteophyte complex. There is  spurring of the uncovertebral joints with borderline left foraminal  encroachment.  7. At C7-T1 and T1-2 and T2-3 levels are unremarkable.                 Radiation dose reduction techniques were utilized, including automated  exposure control and exposure modulation based on body size.     This report was finalized on 2018 8:32 PM by Dr. Robi Cruz M.D.       CT Cervical Spine Without Contrast [[id number]] Collected:  18     Updated:  18    Narrative:       CT SCAN OF THE BRAIN WITHOUT CONTRAST     HISTORY: Recent onset of right arm numbness.     TECHNIQUE: The CT scan was performed as an emergency procedure through  the brain without contrast. There is mild diffuse atrophy and moderate  chronic small vessel ischemic change similar to the study of 2016.  There is no evidence of acute intracranial hemorrhage or mass effect and  the visualized sinuses are clear.     CT SCAN OF THE CERVICAL SPINE     HISTORY: Neck pain and right arm numbness.     FINDINGS: The CT scan was performed as an emergency procedure through  the cervical spine and demonstrates followin. The foramen magnum appears normal. There is some degenerative  spurring involving the odontoid  and anterior arch of C1. This does not  affect the spinal canal.        2. At C2-3 there is a minimal central disc bulge and slight right  lateral facet spurring. There is no central or foraminal encroachment.  3. At C3-4 there is a slight posterior disc osteophyte complex. There is  moderate spurring of the right uncovertebral joint and right lateral  facet resulting in moderate right foraminal encroachment.  4. At C4-5 there is a mild posterior disc osteophyte complex. There is  spurring of the left and right lateral facets and right uncovertebral  joint and resulting in moderate right and slight left foraminal  encroachment.  5. At C5-6 there is a slight posterior disc osteophyte complex. There is  some spurring of the right lateral facet and right uncovertebral joint  resulting in borderline right foraminal encroachment.  6. At C6-7 there is mild posterior disc osteophyte complex. There is  spurring of the uncovertebral joints with borderline left foraminal  encroachment.  7. At C7-T1 and T1-2 and T2-3 levels are unremarkable.                 Radiation dose reduction techniques were utilized, including automated  exposure control and exposure modulation based on body size.     This report was finalized on 8/21/2018 8:32 PM by Dr. Robi Cruz M.D.       XR Chest 2 View [[id number]] Collected:  08/21/18 1911     Updated:  08/21/18 1915    Narrative:       PA AND LATERAL CHEST     CLINICAL HISTORY: Dyspnea. Possible acute CVA.     The lungs are fairly well-expanded and appear free of infiltrates. There  is minimal linear atelectasis at the left lung base. There are no  pleural effusions. The heart is normal in size. The thoracic aorta is  mildly tortuous. The bony thorax is osteopenic.     IMPRESSIONS: No evidence of active disease within the chest.     This report was finalized on 8/21/2018 7:12 PM by Dr. Mateo Aguillon M.D.             For this problem, the following was ordered:  Orders Placed This Encounter    Procedures   • CT Head Without Contrast   • CT Cervical Spine Without Contrast   • XR Chest 2 View   • MRI Brain With & Without Contrast   • Comprehensive Metabolic Panel   • Urinalysis With Microscopic If Indicated (No Culture) - Urine, Clean Catch   • Troponin   • BNP   • CBC Auto Differential   • Urinalysis, Microscopic Only - Urine, Clean Catch   • Basic Metabolic Panel   • CBC Auto Differential   • Diet Regular; Thin   • Place Sequential Compression Device   • LIPPS (on-call MD unless specified)   • Inpatient Neurology Consult   • ECG 12 Lead   • Insert peripheral IV   • Initiate Observation Status   • Tele Bed Request   • CBC & Differential   • CBC & Differential       Problem List Items Addressed This Visit     Stroke-like symptoms - Primary          ASSESSMENT/PLAN: This is a 83 y.o. female who has   Past Medical History:   Diagnosis Date   • Hypertension     presents with neck pain and right arm numbness and tingling and weakness.    1.  Possible left hemispheric CVA versus cervical radiculopathy:  - Stroke labs; B12, TSH, lipid and A1c.  TSH 2.8  - MRI brain  - CTA head and neck  - mri cervical spine  - Telemetry   - PTOT speech rehabilitation assessment  - DVT prophylaxis  - Swallow study  - Statins  - Echocardiogram.  - Antiplatelet therapy and statins.  - Cardiac key (Holter) to monitor heart for arrhythmias at discharge    2. At discharge Secondary stroke prophylaxis: Ideal targets for stroke prevention would be Blood pressure < 130/80; B12 > 500 TSH in normal range and LDL < 70; HbA1c < 6.5 and smoking cessation if applicable.      Thank you for allowing us to participate in the care of this patient.    Anna Cao MD  08/22/18  8:08 AM

## 2018-08-22 NOTE — PLAN OF CARE
Problem: Patient Care Overview  Goal: Plan of Care Review  Outcome: Ongoing (interventions implemented as appropriate)   08/21/18 2157 08/22/18 0328   Plan of Care Review   Progress --  improving   OTHER   Outcome Summary --  NIH 1. Pt. remains A&Ox4 throughout shift. Tolerating diet. Plan for MRI after being seen by Dr. Morales in am. SCDs on. Up with 1 assist.   Coping/Psychosocial   Plan of Care Reviewed With patient --        Problem: Fall Risk (Adult)  Goal: Identify Related Risk Factors and Signs and Symptoms  Outcome: Ongoing (interventions implemented as appropriate)    Goal: Absence of Fall  Outcome: Ongoing (interventions implemented as appropriate)      Problem: Stroke (Ischemic) (Adult)  Goal: Signs and Symptoms of Listed Potential Problems Will be Absent, Minimized or Managed (Stroke)  Outcome: Ongoing (interventions implemented as appropriate)

## 2018-08-22 NOTE — H&P
History and physical    Primary care physician  Dr. GAINES    Chief complaint  Right upper extremity numbness tingling and weakness    History of present illness  83-year-old white female with history of hypertension osteoarthritis carpal tunnel syndrome otherwise in good health admitted through emergency room with the right upper extremity numbness tingling for last 2-3 days.  Patient also have weakness of the hand .  Patient denies any headache or vision problem speech problem or right lower extremity complaint.  Patient workup in ER is essentially negative but she is admitted to rule out TIA.  Patient also denies any chest pain shortness of breath abdominal pain nausea vomiting diarrhea.     PAST MEDICAL HISTORY  • Hypertension      Carpal tunnel syndrome  Osteoarthritis     PAST SURGICAL HISTORY  Surgical History         Past Surgical History:   Procedure Laterality Date   • CARPAL TUNNEL RELEASE       • CHOLECYSTECTOMY       • HYSTERECTOMY             FAMILY HISTORY  History reviewed. No pertinent family history.     SOCIAL HISTORY  Social History   Social History            Social History   • Marital status:        Spouse name: N/A   • Number of children: N/A   • Years of education: N/A          Occupational History   • Not on file.           Social History Main Topics   • Smoking status: Former Smoker   • Smokeless tobacco: Never Used   • Alcohol use No   • Drug use: No   • Sexual activity: Not on file           Other Topics Concern   • Not on file          Social History Narrative   • No narrative on file         ALLERGIES  Codeine  Home medications reviewed    REVIEW OF SYSTEMS  Review of Systems   Constitutional: Positive for fatigue. Negative for activity change, appetite change, diaphoresis and fever.   HENT: Positive for sinus pressure. Negative for trouble swallowing.    Eyes: Negative for visual disturbance.   Respiratory: Negative for cough, chest tightness, shortness of breath and  "wheezing.    Cardiovascular: Negative for chest pain, palpitations and leg swelling.   Gastrointestinal: Negative for abdominal pain, diarrhea, nausea and vomiting.   Genitourinary: Negative for dysuria.   Musculoskeletal: Positive for neck pain. Negative for arthralgias, back pain, gait problem and neck stiffness.   Skin: Negative for rash.   Neurological: Positive for numbness (rue) and headaches. Negative for dizziness, tremors, syncope, facial asymmetry, speech difficulty and light-headedness.      PHYSICAL EXAM  Blood pressure 132/74, pulse 82, temperature 97.6 °F (36.4 °C), temperature source Oral, resp. rate 18, height 165.1 cm (65\"), weight 80.8 kg (178 lb 1.6 oz), SpO2 96 %.    Constitutional: She is oriented to person, place, and time and well-developed, well-nourished, and in no distress. She appears to not be writhing in pain and not dehydrated. She appears healthy. She does not have a sickly appearance. No distress.   Head: Normocephalic and atraumatic.   Mouth/Throat: Uvula is midline, oropharynx is clear and moist and mucous membranes are normal. No trismus in the jaw.   Eyes: Pupils are equal, round, and reactive to light. EOM are normal.   Neck: Normal range of motion. Neck supple. No spinous process tenderness and no muscular tenderness present. Normal range of motion present. No Brudzinski's sign and no Kernig's sign noted.   Cardiovascular: Normal rate, regular rhythm, S1 normal, S2 normal and normal heart sounds.  Exam reveals no gallop and no friction rub.    No murmur heard.  Pulmonary/Chest: Effort normal and breath sounds normal. She has no decreased breath sounds. She has no wheezes. She has no rhonchi. She has no rales.   Abdominal: Soft. Normal appearance and bowel sounds are normal. There is no tenderness. There is no rebound and no guarding.   Musculoskeletal: Normal range of motion.   Neurological: She is alert and oriented to person, place, and time.   Cranial nerves 2-12 intact as " tested.  Sensation intact.  5/5 strength in all extremities.  Normal cerebellar testing.  No drift in any extremity.  No dysarthria.  No aphasia.  No neglect/extinction.   Skin: Skin is warm, dry and intact. She is not diaphoretic. There is pallor.   Psychiatric: Affect and judgment normal.     LAB RESULTS  Lab Results (last 24 hours)     Procedure Component Value Units Date/Time    Vitamin B12 [[id number]]  (Abnormal) Collected:  08/22/18 0844    Specimen:  Blood Updated:  08/22/18 0937     Vitamin B-12 160 (L) pg/mL     Lipid Panel [[id number]]  (Abnormal) Collected:  08/22/18 0844    Specimen:  Blood Updated:  08/22/18 0920     Total Cholesterol 155 mg/dL      Triglycerides 68 mg/dL      HDL Cholesterol 68 (H) mg/dL      LDL Cholesterol  73 mg/dL      VLDL Cholesterol 13.6 mg/dL      LDL/HDL Ratio 1.08    Narrative:       Cholesterol Reference Ranges  (U.S. Department of Health and Human Services ATP III Classifications)    Desirable          <200 mg/dL  Borderline High    200-239 mg/dL  High Risk          >240 mg/dL      Triglyceride Reference Ranges  (U.S. Department of Health and Human Services ATP III Classifications)    Normal           <150 mg/dL  Borderline High  150-199 mg/dL  High             200-499 mg/dL  Very High        >500 mg/dL    HDL Reference Ranges  (U.S. Department of Health and Human Services ATP III Classifcations)    Low     <40 mg/dl (major risk factor for CHD)  High    >60 mg/dl ('negative' risk factor for CHD)        LDL Reference Ranges  (U.S. Department of Health and Human Services ATP III Classifcations)    Optimal          <100 mg/dL  Near Optimal     100-129 mg/dL  Borderline High  130-159 mg/dL  High             160-189 mg/dL  Very High        >189 mg/dL    Hemoglobin A1c [[id number]]  (Normal) Collected:  08/22/18 0523    Specimen:  Blood Updated:  08/22/18 0908     Hemoglobin A1C 5.30 %     Narrative:       Hemoglobin A1C Ranges:    Increased Risk for Diabetes  5.7% to  6.4%  Diabetes                     >= 6.5%  Diabetic Goal                < 7.0%    TSH [[id number]]  (Normal) Collected:  08/22/18 0523    Specimen:  Blood Updated:  08/22/18 0846     TSH 2.810 mIU/mL     Basic Metabolic Panel [[id number]]  (Abnormal) Collected:  08/22/18 0523    Specimen:  Blood Updated:  08/22/18 0625     Glucose 88 mg/dL      BUN 7 (L) mg/dL      Creatinine 0.78 mg/dL      Sodium 142 mmol/L      Potassium 4.0 mmol/L      Chloride 104 mmol/L      CO2 25.4 mmol/L      Calcium 9.0 mg/dL      eGFR Non African Amer 71 mL/min/1.73      BUN/Creatinine Ratio 9.0     Anion Gap 12.6 mmol/L     Narrative:       The MDRD GFR formula is only valid for adults with stable renal function between ages 18 and 70.    CBC & Differential [[id number]] Collected:  08/22/18 0523    Specimen:  Blood Updated:  08/22/18 0603    Narrative:       The following orders were created for panel order CBC & Differential.  Procedure                               Abnormality         Status                     ---------                               -----------         ------                     CBC Auto Differential[[id number]]        Abnormal            Final result                 Please view results for these tests on the individual orders.    CBC Auto Differential [[id number]]  (Abnormal) Collected:  08/22/18 0523    Specimen:  Blood Updated:  08/22/18 0603     WBC 5.30 10*3/mm3      RBC 4.22 10*6/mm3      Hemoglobin 12.5 g/dL      Hematocrit 39.4 %      MCV 93.4 fL      MCH 29.6 pg      MCHC 31.7 (L) g/dL      RDW 12.9 %      RDW-SD 43.6 fl      MPV 10.3 fL      Platelets 240 10*3/mm3      Neutrophil % 53.0 %      Lymphocyte % 33.8 %      Monocyte % 11.7 %      Eosinophil % 0.9 %      Basophil % 0.2 %      Immature Grans % 0.4 %      Neutrophils, Absolute 2.81 10*3/mm3      Lymphocytes, Absolute 1.79 10*3/mm3      Monocytes, Absolute 0.62 10*3/mm3      Eosinophils, Absolute 0.05 10*3/mm3      Basophils, Absolute 0.01 10*3/mm3       Immature Grans, Absolute 0.02 10*3/mm3     Urinalysis, Microscopic Only - Urine, Clean Catch [[id number]] Collected:  08/21/18 1840    Specimen:  Urine from Urine, Clean Catch Updated:  08/21/18 1915     RBC, UA None Seen /HPF      WBC, UA 0-2 /HPF      Bacteria, UA None Seen /HPF      Squamous Epithelial Cells, UA 0-2 /HPF      Hyaline Casts, UA None Seen /LPF      Methodology Manual Light Microscopy    Comprehensive Metabolic Panel [[id number]]  (Abnormal) Collected:  08/21/18 1839    Specimen:  Blood Updated:  08/21/18 1912     Glucose 92 mg/dL      BUN 5 (L) mg/dL      Creatinine 0.74 mg/dL      Sodium 139 mmol/L      Potassium 4.2 mmol/L      Chloride 101 mmol/L      CO2 24.2 mmol/L      Calcium 9.5 mg/dL      Total Protein 7.0 g/dL      Albumin 4.50 g/dL      ALT (SGPT) 12 U/L      AST (SGOT) 19 U/L      Comment: Specimen hemolyzed.  Results may be affected.        Alkaline Phosphatase 131 (H) U/L      Total Bilirubin 0.6 mg/dL      eGFR Non African Amer 75 mL/min/1.73      Globulin 2.5 gm/dL      A/G Ratio 1.8 g/dL      BUN/Creatinine Ratio 6.8 (L)     Anion Gap 13.8 mmol/L     Narrative:       The MDRD GFR formula is only valid for adults with stable renal function between ages 18 and 70.    Troponin [[id number]]  (Normal) Collected:  08/21/18 1839    Specimen:  Blood Updated:  08/21/18 1911     Troponin T <0.010 ng/mL     Narrative:       Troponin T Reference Ranges:  Less than 0.03 ng/mL:    Negative for AMI  0.03 to 0.09 ng/mL:      Indeterminant for AMI  Greater than 0.09 ng/mL: Positive for AMI    BNP [[id number]]  (Normal) Collected:  08/21/18 1839    Specimen:  Blood Updated:  08/21/18 1909     proBNP 161.6 pg/mL     Narrative:       Among patients with dyspnea, NT-proBNP is highly sensitive for the detection of acute congestive heart failure. In addition NT-proBNP of <300 pg/ml effectively rules out acute congestive heart failure with 99% negative predictive value.    Urinalysis With Microscopic If  Indicated (No Culture) - Urine, Clean Catch [[id number]]  (Abnormal) Collected:  08/21/18 1840    Specimen:  Urine from Urine, Clean Catch Updated:  08/21/18 1855     Color, UA Yellow     Appearance, UA Clear     pH, UA 6.5     Specific Gravity, UA <=1.005     Glucose, UA Negative     Ketones, UA Trace (A)     Bilirubin, UA Negative     Blood, UA Negative     Protein, UA Negative     Leuk Esterase, UA Trace (A)     Nitrite, UA Negative     Urobilinogen, UA 0.2 E.U./dL    CBC & Differential [[id number]] Collected:  08/21/18 1839    Specimen:  Blood Updated:  08/21/18 1854    Narrative:       The following orders were created for panel order CBC & Differential.  Procedure                               Abnormality         Status                     ---------                               -----------         ------                     CBC Auto Differential[[id number]]        Abnormal            Final result                 Please view results for these tests on the individual orders.    CBC Auto Differential [[id number]]  (Abnormal) Collected:  08/21/18 1839    Specimen:  Blood Updated:  08/21/18 1854     WBC 6.36 10*3/mm3      RBC 4.52 10*6/mm3      Hemoglobin 13.7 g/dL      Hematocrit 41.5 %      MCV 91.8 fL      MCH 30.3 pg      MCHC 33.0 g/dL      RDW 12.7 %      RDW-SD 42.8 fl      MPV 10.6 fL      Platelets 261 10*3/mm3      Neutrophil % 66.1 %      Lymphocyte % 23.9 %      Monocyte % 9.6 %      Eosinophil % 0.2 (L) %      Basophil % 0.2 %      Immature Grans % 0.2 %      Neutrophils, Absolute 4.21 10*3/mm3      Lymphocytes, Absolute 1.52 10*3/mm3      Monocytes, Absolute 0.61 10*3/mm3      Eosinophils, Absolute 0.01 10*3/mm3      Basophils, Absolute 0.01 10*3/mm3      Immature Grans, Absolute 0.01 10*3/mm3         Imaging Results (last 24 hours)     Procedure Component Value Units Date/Time    CT Angiogram Head With & Without Contrast [[id number]] Updated:  08/22/18 1034    CT Angiogram Neck With & Without Contrast  [[id number]] Updated:  18 1034    CT Head Without Contrast [[id number]] Collected:  18     Updated:  18    Narrative:       CT SCAN OF THE BRAIN WITHOUT CONTRAST     HISTORY: Recent onset of right arm numbness.     TECHNIQUE: The CT scan was performed as an emergency procedure through  the brain without contrast. There is mild diffuse atrophy and moderate  chronic small vessel ischemic change similar to the study of 2016.  There is no evidence of acute intracranial hemorrhage or mass effect and  the visualized sinuses are clear.     CT SCAN OF THE CERVICAL SPINE     HISTORY: Neck pain and right arm numbness.     FINDINGS: The CT scan was performed as an emergency procedure through  the cervical spine and demonstrates followin. The foramen magnum appears normal. There is some degenerative  spurring involving the odontoid and anterior arch of C1. This does not  affect the spinal canal.        2. At C2-3 there is a minimal central disc bulge and slight right  lateral facet spurring. There is no central or foraminal encroachment.  3. At C3-4 there is a slight posterior disc osteophyte complex. There is  moderate spurring of the right uncovertebral joint and right lateral  facet resulting in moderate right foraminal encroachment.  4. At C4-5 there is a mild posterior disc osteophyte complex. There is  spurring of the left and right lateral facets and right uncovertebral  joint and resulting in moderate right and slight left foraminal  encroachment.  5. At C5-6 there is a slight posterior disc osteophyte complex. There is  some spurring of the right lateral facet and right uncovertebral joint  resulting in borderline right foraminal encroachment.  6. At C6-7 there is mild posterior disc osteophyte complex. There is  spurring of the uncovertebral joints with borderline left foraminal  encroachment.  7. At C7-T1 and T1-2 and T2-3 levels are unremarkable.                 Radiation dose  reduction techniques were utilized, including automated  exposure control and exposure modulation based on body size.     This report was finalized on 2018 8:32 PM by Dr. Robi Cruz M.D.       CT Cervical Spine Without Contrast [[id number]] Collected:  18     Updated:  18    Narrative:       CT SCAN OF THE BRAIN WITHOUT CONTRAST     HISTORY: Recent onset of right arm numbness.     TECHNIQUE: The CT scan was performed as an emergency procedure through  the brain without contrast. There is mild diffuse atrophy and moderate  chronic small vessel ischemic change similar to the study of 2016.  There is no evidence of acute intracranial hemorrhage or mass effect and  the visualized sinuses are clear.     CT SCAN OF THE CERVICAL SPINE     HISTORY: Neck pain and right arm numbness.     FINDINGS: The CT scan was performed as an emergency procedure through  the cervical spine and demonstrates followin. The foramen magnum appears normal. There is some degenerative  spurring involving the odontoid and anterior arch of C1. This does not  affect the spinal canal.        2. At C2-3 there is a minimal central disc bulge and slight right  lateral facet spurring. There is no central or foraminal encroachment.  3. At C3-4 there is a slight posterior disc osteophyte complex. There is  moderate spurring of the right uncovertebral joint and right lateral  facet resulting in moderate right foraminal encroachment.  4. At C4-5 there is a mild posterior disc osteophyte complex. There is  spurring of the left and right lateral facets and right uncovertebral  joint and resulting in moderate right and slight left foraminal  encroachment.  5. At C5-6 there is a slight posterior disc osteophyte complex. There is  some spurring of the right lateral facet and right uncovertebral joint  resulting in borderline right foraminal encroachment.  6. At C6-7 there is mild posterior disc osteophyte complex. There  is  spurring of the uncovertebral joints with borderline left foraminal  encroachment.  7. At C7-T1 and T1-2 and T2-3 levels are unremarkable.                 Radiation dose reduction techniques were utilized, including automated  exposure control and exposure modulation based on body size.     This report was finalized on 8/21/2018 8:32 PM by Dr. Robi Cruz M.D.       XR Chest 2 View [[id number]] Collected:  08/21/18 1911     Updated:  08/21/18 1915    Narrative:       PA AND LATERAL CHEST     CLINICAL HISTORY: Dyspnea. Possible acute CVA.     The lungs are fairly well-expanded and appear free of infiltrates. There  is minimal linear atelectasis at the left lung base. There are no  pleural effusions. The heart is normal in size. The thoracic aorta is  mildly tortuous. The bony thorax is osteopenic.     IMPRESSIONS: No evidence of active disease within the chest.     This report was finalized on 8/21/2018 7:12 PM by Dr. Mateo Aguillon M.D.           EKG                             Rhythm/Rate: NSR rate 76  P waves and MS: Normal P waves  QRS, axis: Normal QRS   ST and T waves: Normal ST       Current Facility-Administered Medications:   •  acetaminophen (TYLENOL) tablet 650 mg, 650 mg, Oral, Q6H PRN, Matt Morales MD  •  amLODIPine (NORVASC) tablet 5 mg, 5 mg, Oral, Q24H, Matt Morales MD, 5 mg at 08/22/18 0935  •  aspirin tablet 325 mg, 325 mg, Oral, Daily, Matt Morales MD, 325 mg at 08/22/18 0935  •  atorvastatin (LIPITOR) tablet 40 mg, 40 mg, Oral, Nightly, Anna Cao MD  •  cholecalciferol (VITAMIN D3) tablet 1,000 Units, 1,000 Units, Oral, Daily, Matt Morales MD, 1,000 Units at 08/22/18 0935  •  fluticasone (FLONASE) 50 MCG/ACT nasal spray 2 spray, 2 spray, Each Nare, Daily, Matt Morales MD  •  lisinopril (PRINIVIL,ZESTRIL) tablet 40 mg, 40 mg, Oral, Q12H, Matt Morales MD, 40 mg at 08/22/18 0935  •  LORazepam (ATIVAN) injection 0.5 mg, 0.5 mg, Intravenous, Once, Matt Morales MD  •   pantoprazole (PROTONIX) EC tablet 40 mg, 40 mg, Oral, Q AM, Emile Morales MD, 40 mg at 08/22/18 0622  •  Insert peripheral IV, , , Once **AND** sodium chloride 0.9 % flush 10 mL, 10 mL, Intravenous, PRN, Ria Fuller, NAHID  •  sodium chloride 0.9 % infusion, 75 mL/hr, Intravenous, Continuous, Emile Morales MD, Last Rate: 75 mL/hr at 08/22/18 0555, 75 mL/hr at 08/22/18 0555     ASSESSMENT  Acute right upper extremity weakness numbness tingling rule out TIA versus cervical radiculopathy  Hypertension  Osteoarthritis  Carpal tunnel syndrome    PLAN  Admit  Aspirin and Lipitor  IV fluid  Neuro consult  Stroke education  Check MRI of the brain and C-spine  2-D echo with Doppler  Continue home medication and adjust the doses  Stress ulcer DVT prophylaxis  PTOT  Supportive care  Discussed with family  Follow closely further recommendation chronic hospital course    EMILE MORALES MD

## 2018-08-22 NOTE — PROGRESS NOTES
Clinical Pharmacy Services: Medication History    Katja Klein is a 83 y.o. female presenting to [facility] for   Chief Complaint   Patient presents with   • Numbness     RIGHT ARM   • Neck Pain       She  has a past medical history of Hypertension.    Allergies as of 08/21/2018 - Reviewed 08/21/2018   Allergen Reaction Noted   • Codeine Nausea Only 08/21/2018       Medication information was obtained from: Patient, medication list  Pharmacy and Phone Number: [facility] [phone]     Prior to Admission Medications     Prescriptions Last Dose Informant Patient Reported? Taking?    amLODIPine (NORVASC) 5 MG tablet 8/21/2018 Self Yes Yes    Take 5 mg by mouth Daily.    cholecalciferol (VITAMIN D3) 1000 units tablet 8/21/2018 Self Yes Yes    Take 1,000 Units by mouth Daily.    fluticasone (FLONASE) 50 MCG/ACT nasal spray Past Month Self Yes Yes    2 sprays into the nostril(s) as directed by provider Daily As Needed for Rhinitis.    lisinopril (PRINIVIL,ZESTRIL) 40 MG tablet 8/21/2018 Self Yes Yes    Take 40 mg by mouth 2 (Two) Times a Day.    psyllium (METAMUCIL) 0.52 g capsule 8/20/2018 Self Yes Yes    Take 0.52 g by mouth Daily.    raNITIdine (ZANTAC) 300 MG tablet 8/21/2018 Self Yes Yes    Take 300 mg by mouth Daily.            Medication notes: All medications added to profile per patient medication list.    This medication list is complete to the best of my knowledge as of 8/21/2018    Please call if questions.    Rosita Hercules, Medication History Technician  8/21/2018 8:12 PM

## 2018-08-22 NOTE — PROGRESS NOTES
Discharge Planning Assessment  [facility]     Patient Name: Katja Klein  MRN: [id number]  Today's Date: 8/21/2018    Admit Date: 8/21/2018          Discharge Needs Assessment     Row Name 08/21/18 2019       Living Environment    Lives With alone    Current Living Arrangements home/apartment/condo    Primary Care Provided by self    Provides Primary Care For no one    Family Caregiver if Needed child(gladis), adult    Quality of Family Relationships helpful;involved;supportive    Able to Return to Prior Arrangements yes       Resource/Environmental Concerns    Resource/Environmental Concerns none    Transportation Concerns car, none       Transition Planning    Patient/Family Anticipates Transition to home    Patient/Family Anticipated Services at Transition none    Transportation Anticipated car, drives self;family or friend will provide       Discharge Needs Assessment    Readmission Within the Last 30 Days no previous admission in last 30 days    Concerns to be Addressed no discharge needs identified;denies needs/concerns at this time    Equipment Currently Used at Home none    Anticipated Changes Related to Illness none    Equipment Needed After Discharge none            Discharge Plan    No documentation.       Destination     No service coordination in this encounter.      Durable Medical Equipment     No service coordination in this encounter.      Dialysis/Infusion     No service coordination in this encounter.      Home Medical Care     No service coordination in this encounter.      Social Care     No service coordination in this encounter.                Demographic Summary    No documentation.           Functional Status    No documentation.           Psychosocial    No documentation.           Abuse/Neglect    No documentation.           Legal    No documentation.           Substance Abuse    No documentation.           Patient Forms    No documentation.         Rocio Farmer RN

## 2018-08-22 NOTE — SIGNIFICANT NOTE
08/22/18 145   Rehab Time/Intention   Evaluation Not Performed other (see comments)  (Attempted PT eval. Patient reported she is still having some sensation deficits in R UE but otherwise denies any mobility issues or symptoms at this time. Denies any recent falls. PT eval not warranted. Will sign off.)   Rehab Treatment   Discipline physical therapist

## 2018-08-23 LAB
ANION GAP SERPL CALCULATED.3IONS-SCNC: 4.5 MMOL/L
BASOPHILS # BLD AUTO: 0.02 10*3/MM3 (ref 0–0.2)
BASOPHILS NFR BLD AUTO: 0.4 % (ref 0–1.5)
BH CV ECHO MEAS - ACS: 1.8 CM
BH CV ECHO MEAS - AI DEC SLOPE: 213 CM/SEC^2
BH CV ECHO MEAS - AI MAX PG: 69.9 MMHG
BH CV ECHO MEAS - AI MAX VEL: 418 CM/SEC
BH CV ECHO MEAS - AI P1/2T: 574.8 MSEC
BH CV ECHO MEAS - AO MAX PG: 15 MMHG
BH CV ECHO MEAS - AO MEAN PG (FULL): 7 MMHG
BH CV ECHO MEAS - AO MEAN PG: 9 MMHG
BH CV ECHO MEAS - AO ROOT AREA (BSA CORRECTED): 1.8
BH CV ECHO MEAS - AO ROOT AREA: 8.6 CM^2
BH CV ECHO MEAS - AO ROOT DIAM: 3.3 CM
BH CV ECHO MEAS - AO V2 MAX: 194 CM/SEC
BH CV ECHO MEAS - AO V2 MEAN: 138 CM/SEC
BH CV ECHO MEAS - AO V2 VTI: 42.9 CM
BH CV ECHO MEAS - AVA(I,A): 1.9 CM^2
BH CV ECHO MEAS - AVA(I,D): 1.9 CM^2
BH CV ECHO MEAS - BSA(HAYCOCK): 1.9 M^2
BH CV ECHO MEAS - BSA: 1.9 M^2
BH CV ECHO MEAS - BZI_BMI: 29.6 KILOGRAMS/M^2
BH CV ECHO MEAS - BZI_METRIC_HEIGHT: 165.1 CM
BH CV ECHO MEAS - BZI_METRIC_WEIGHT: 80.7 KG
BH CV ECHO MEAS - EDV(CUBED): 103.8 ML
BH CV ECHO MEAS - EDV(MOD-SP2): 58 ML
BH CV ECHO MEAS - EDV(MOD-SP4): 59 ML
BH CV ECHO MEAS - EDV(TEICH): 102.4 ML
BH CV ECHO MEAS - EF(CUBED): 68.4 %
BH CV ECHO MEAS - EF(MOD-SP2): 65.5 %
BH CV ECHO MEAS - EF(MOD-SP4): 64.4 %
BH CV ECHO MEAS - EF(TEICH): 60 %
BH CV ECHO MEAS - ESV(CUBED): 32.8 ML
BH CV ECHO MEAS - ESV(MOD-SP2): 20 ML
BH CV ECHO MEAS - ESV(MOD-SP4): 21 ML
BH CV ECHO MEAS - ESV(TEICH): 41 ML
BH CV ECHO MEAS - FS: 31.9 %
BH CV ECHO MEAS - IVS/LVPW: 1
BH CV ECHO MEAS - IVSD: 0.9 CM
BH CV ECHO MEAS - LAT PEAK E' VEL: 9 CM/SEC
BH CV ECHO MEAS - LV DIASTOLIC VOL/BSA (35-75): 31.3 ML/M^2
BH CV ECHO MEAS - LV MASS(C)D: 142.7 GRAMS
BH CV ECHO MEAS - LV MASS(C)DI: 75.8 GRAMS/M^2
BH CV ECHO MEAS - LV MEAN PG: 2 MMHG
BH CV ECHO MEAS - LV SYSTOLIC VOL/BSA (12-30): 11.2 ML/M^2
BH CV ECHO MEAS - LV V1 MAX: 102 CM/SEC
BH CV ECHO MEAS - LV V1 MEAN: 61.7 CM/SEC
BH CV ECHO MEAS - LV V1 VTI: 24.1 CM
BH CV ECHO MEAS - LVIDD: 4.7 CM
BH CV ECHO MEAS - LVIDS: 3.2 CM
BH CV ECHO MEAS - LVLD AP2: 6.2 CM
BH CV ECHO MEAS - LVLD AP4: 7.4 CM
BH CV ECHO MEAS - LVLS AP2: 4.7 CM
BH CV ECHO MEAS - LVLS AP4: 5.7 CM
BH CV ECHO MEAS - LVOT AREA (M): 3.5 CM^2
BH CV ECHO MEAS - LVOT AREA: 3.5 CM^2
BH CV ECHO MEAS - LVOT DIAM: 2.1 CM
BH CV ECHO MEAS - LVPWD: 0.9 CM
BH CV ECHO MEAS - MED PEAK E' VEL: 7 CM/SEC
BH CV ECHO MEAS - MR MAX PG: 32.9 MMHG
BH CV ECHO MEAS - MR MAX VEL: 287 CM/SEC
BH CV ECHO MEAS - MV A DUR: 0.21 SEC
BH CV ECHO MEAS - MV A MAX VEL: 95.3 CM/SEC
BH CV ECHO MEAS - MV DEC SLOPE: 206 CM/SEC^2
BH CV ECHO MEAS - MV DEC TIME: 0.24 SEC
BH CV ECHO MEAS - MV E MAX VEL: 66.1 CM/SEC
BH CV ECHO MEAS - MV E/A: 0.69
BH CV ECHO MEAS - MV MEAN PG: 2 MMHG
BH CV ECHO MEAS - MV P1/2T MAX VEL: 75.2 CM/SEC
BH CV ECHO MEAS - MV P1/2T: 106.9 MSEC
BH CV ECHO MEAS - MV V2 MEAN: 60 CM/SEC
BH CV ECHO MEAS - MV V2 VTI: 27.8 CM
BH CV ECHO MEAS - MVA P1/2T LCG: 2.9 CM^2
BH CV ECHO MEAS - MVA(P1/2T): 2.1 CM^2
BH CV ECHO MEAS - MVA(VTI): 3 CM^2
BH CV ECHO MEAS - PA ACC SLOPE: 27.5 CM/SEC^2
BH CV ECHO MEAS - PA ACC TIME: 0.1 SEC
BH CV ECHO MEAS - PA MAX PG (FULL): 1.7 MMHG
BH CV ECHO MEAS - PA MAX PG: 3.4 MMHG
BH CV ECHO MEAS - PA PR(ACCEL): 34.5 MMHG
BH CV ECHO MEAS - PA V2 MAX: 92.3 CM/SEC
BH CV ECHO MEAS - PI END-D VEL: 105 CM/SEC
BH CV ECHO MEAS - PULM A REVS DUR: 0.12 SEC
BH CV ECHO MEAS - PULM A REVS VEL: 37 CM/SEC
BH CV ECHO MEAS - PULM DIAS VEL: 34.6 CM/SEC
BH CV ECHO MEAS - PULM S/D: 1.7
BH CV ECHO MEAS - PULM SYS VEL: 58.7 CM/SEC
BH CV ECHO MEAS - PVA(V,A): 2.5 CM^2
BH CV ECHO MEAS - PVA(V,D): 2.5 CM^2
BH CV ECHO MEAS - QP/QS: 0.61
BH CV ECHO MEAS - RAP SYSTOLE: 3 MMHG
BH CV ECHO MEAS - RV MAX PG: 1.8 MMHG
BH CV ECHO MEAS - RV MEAN PG: 1 MMHG
BH CV ECHO MEAS - RV V1 MAX: 66.2 CM/SEC
BH CV ECHO MEAS - RV V1 MEAN: 42.3 CM/SEC
BH CV ECHO MEAS - RV V1 VTI: 14.6 CM
BH CV ECHO MEAS - RVOT AREA: 3.5 CM^2
BH CV ECHO MEAS - RVOT DIAM: 2.1 CM
BH CV ECHO MEAS - RVSP: 29 MMHG
BH CV ECHO MEAS - SI(AO): 194.9 ML/M^2
BH CV ECHO MEAS - SI(CUBED): 37.7 ML/M^2
BH CV ECHO MEAS - SI(LVOT): 44.3 ML/M^2
BH CV ECHO MEAS - SI(MOD-SP2): 20.2 ML/M^2
BH CV ECHO MEAS - SI(MOD-SP4): 20.2 ML/M^2
BH CV ECHO MEAS - SI(TEICH): 32.6 ML/M^2
BH CV ECHO MEAS - SUP REN AO DIAM: 1.9 CM
BH CV ECHO MEAS - SV(AO): 366.9 ML
BH CV ECHO MEAS - SV(CUBED): 71.1 ML
BH CV ECHO MEAS - SV(LVOT): 83.5 ML
BH CV ECHO MEAS - SV(MOD-SP2): 38 ML
BH CV ECHO MEAS - SV(MOD-SP4): 38 ML
BH CV ECHO MEAS - SV(RVOT): 50.6 ML
BH CV ECHO MEAS - SV(TEICH): 61.4 ML
BH CV ECHO MEAS - TAPSE (>1.6): 2.1 CM2
BH CV ECHO MEAS - TR MAX VEL: 255 CM/SEC
BH CV ECHO MEASUREMENTS AVERAGE E/E' RATIO: 8.26
BH CV VAS BP RIGHT ARM: NORMAL MMHG
BH CV XLRA - RV BASE: 3.1 CM
BH CV XLRA - TDI S': 10 CM/SEC
BUN BLD-MCNC: 7 MG/DL (ref 8–23)
BUN/CREAT SERPL: 10.8 (ref 7–25)
CALCIUM SPEC-SCNC: 8.6 MG/DL (ref 8.6–10.5)
CHLORIDE SERPL-SCNC: 107 MMOL/L (ref 98–107)
CO2 SERPL-SCNC: 26.5 MMOL/L (ref 22–29)
CREAT BLD-MCNC: 0.65 MG/DL (ref 0.57–1)
DEPRECATED RDW RBC AUTO: 44.2 FL (ref 37–54)
EOSINOPHIL # BLD AUTO: 0.14 10*3/MM3 (ref 0–0.7)
EOSINOPHIL NFR BLD AUTO: 2.9 % (ref 0.3–6.2)
ERYTHROCYTE [DISTWIDTH] IN BLOOD BY AUTOMATED COUNT: 12.9 % (ref 11.7–13)
GFR SERPL CREATININE-BSD FRML MDRD: 87 ML/MIN/1.73
GLUCOSE BLD-MCNC: 82 MG/DL (ref 65–99)
HCT VFR BLD AUTO: 38.5 % (ref 35.6–45.5)
HGB BLD-MCNC: 12.1 G/DL (ref 11.9–15.5)
IMM GRANULOCYTES # BLD: 0 10*3/MM3 (ref 0–0.03)
IMM GRANULOCYTES NFR BLD: 0 % (ref 0–0.5)
LEFT ATRIUM VOLUME INDEX: 25 ML/M2
LV EF 2D ECHO EST: 65 %
LYMPHOCYTES # BLD AUTO: 2.09 10*3/MM3 (ref 0.9–4.8)
LYMPHOCYTES NFR BLD AUTO: 42.6 % (ref 19.6–45.3)
MAXIMAL PREDICTED HEART RATE: 139 BPM
MCH RBC QN AUTO: 29.4 PG (ref 26.9–32)
MCHC RBC AUTO-ENTMCNC: 31.4 G/DL (ref 32.4–36.3)
MCV RBC AUTO: 93.7 FL (ref 80.5–98.2)
MONOCYTES # BLD AUTO: 0.59 10*3/MM3 (ref 0.2–1.2)
MONOCYTES NFR BLD AUTO: 12 % (ref 5–12)
NEUTROPHILS # BLD AUTO: 2.07 10*3/MM3 (ref 1.9–8.1)
NEUTROPHILS NFR BLD AUTO: 42.1 % (ref 42.7–76)
PLATELET # BLD AUTO: 205 10*3/MM3 (ref 140–500)
PMV BLD AUTO: 9.9 FL (ref 6–12)
POTASSIUM BLD-SCNC: 4 MMOL/L (ref 3.5–5.2)
RBC # BLD AUTO: 4.11 10*6/MM3 (ref 3.9–5.2)
SODIUM BLD-SCNC: 138 MMOL/L (ref 136–145)
STRESS TARGET HR: 118 BPM
WBC NRBC COR # BLD: 4.91 10*3/MM3 (ref 4.5–10.7)

## 2018-08-23 PROCEDURE — 25010000002 CYANOCOBALAMIN PER 1000 MCG: Performed by: NURSE PRACTITIONER

## 2018-08-23 PROCEDURE — 97165 OT EVAL LOW COMPLEX 30 MIN: CPT

## 2018-08-23 PROCEDURE — G8987 SELF CARE CURRENT STATUS: HCPCS

## 2018-08-23 PROCEDURE — 85025 COMPLETE CBC W/AUTO DIFF WBC: CPT | Performed by: HOSPITALIST

## 2018-08-23 PROCEDURE — G8989 SELF CARE D/C STATUS: HCPCS

## 2018-08-23 PROCEDURE — 97535 SELF CARE MNGMENT TRAINING: CPT

## 2018-08-23 PROCEDURE — 80048 BASIC METABOLIC PNL TOTAL CA: CPT | Performed by: HOSPITALIST

## 2018-08-23 PROCEDURE — G8988 SELF CARE GOAL STATUS: HCPCS

## 2018-08-23 PROCEDURE — 99233 SBSQ HOSP IP/OBS HIGH 50: CPT | Performed by: NURSE PRACTITIONER

## 2018-08-23 RX ORDER — AMLODIPINE BESYLATE 10 MG/1
10 TABLET ORAL
Status: DISCONTINUED | OUTPATIENT
Start: 2018-08-24 | End: 2018-08-24 | Stop reason: HOSPADM

## 2018-08-23 RX ORDER — CLOPIDOGREL BISULFATE 75 MG/1
75 TABLET ORAL DAILY
Status: DISCONTINUED | OUTPATIENT
Start: 2018-08-23 | End: 2018-08-24 | Stop reason: HOSPADM

## 2018-08-23 RX ORDER — CYANOCOBALAMIN 1000 UG/ML
1000 INJECTION, SOLUTION INTRAMUSCULAR; SUBCUTANEOUS WEEKLY
Status: DISCONTINUED | OUTPATIENT
Start: 2018-08-23 | End: 2018-08-24

## 2018-08-23 RX ORDER — ASPIRIN 81 MG/1
81 TABLET ORAL DAILY
Status: DISCONTINUED | OUTPATIENT
Start: 2018-08-24 | End: 2018-08-24 | Stop reason: HOSPADM

## 2018-08-23 RX ADMIN — LISINOPRIL 40 MG: 40 TABLET ORAL at 08:37

## 2018-08-23 RX ADMIN — ATORVASTATIN CALCIUM 40 MG: 20 TABLET, FILM COATED ORAL at 20:57

## 2018-08-23 RX ADMIN — CYANOCOBALAMIN 1000 MCG: 1000 INJECTION, SOLUTION INTRAMUSCULAR; SUBCUTANEOUS at 13:19

## 2018-08-23 RX ADMIN — PANTOPRAZOLE SODIUM 40 MG: 40 TABLET, DELAYED RELEASE ORAL at 05:13

## 2018-08-23 RX ADMIN — LISINOPRIL 40 MG: 40 TABLET ORAL at 20:57

## 2018-08-23 RX ADMIN — FLUTICASONE PROPIONATE 2 SPRAY: 50 SPRAY, METERED NASAL at 20:56

## 2018-08-23 RX ADMIN — VITAMIN D, TAB 1000IU (100/BT) 1000 UNITS: 25 TAB at 08:37

## 2018-08-23 RX ADMIN — AMLODIPINE BESYLATE 5 MG: 5 TABLET ORAL at 08:36

## 2018-08-23 RX ADMIN — CLOPIDOGREL 75 MG: 75 TABLET, FILM COATED ORAL at 13:19

## 2018-08-23 RX ADMIN — ASPIRIN 325 MG: 325 TABLET ORAL at 08:36

## 2018-08-23 RX ADMIN — SODIUM CHLORIDE 75 ML/HR: 9 INJECTION, SOLUTION INTRAVENOUS at 00:28

## 2018-08-23 NOTE — PLAN OF CARE
Problem: Patient Care Overview  Goal: Plan of Care Review  Outcome: Ongoing (interventions implemented as appropriate)   08/23/18 3882   Plan of Care Review   Progress improving   OTHER   Outcome Summary Pt AOx4, VSS, denies pain. NIH 0. Denies numbness or tingling. Started on Plavix today. Plan to D/C home tomorrow. Will continue to monitor.    Coping/Psychosocial   Plan of Care Reviewed With patient       Problem: Fall Risk (Adult)  Goal: Identify Related Risk Factors and Signs and Symptoms  Outcome: Ongoing (interventions implemented as appropriate)      Problem: Stroke (Ischemic) (Adult)  Goal: Signs and Symptoms of Listed Potential Problems Will be Absent, Minimized or Managed (Stroke)  Outcome: Ongoing (interventions implemented as appropriate)

## 2018-08-23 NOTE — THERAPY DISCHARGE NOTE
Acute Care - Occupational Therapy Initial Eval/Discharge  [facility]     Patient Name: Katja Klein  : 1935  MRN: [id number]  Today's Date: 2018               Admit Date: 2018       ICD-10-CM ICD-9-CM   1. Stroke-like symptoms R29.90 781.99     Patient Active Problem List   Diagnosis   • Stroke-like symptoms     Past Medical History:   Diagnosis Date   • Hypertension      Past Surgical History:   Procedure Laterality Date   • CARPAL TUNNEL RELEASE     • CHOLECYSTECTOMY     • HYSTERECTOMY            OT ASSESSMENT FLOWSHEET (last 72 hours)      Occupational Therapy Evaluation     Row Name 18 1053                   OT Evaluation Time/Intention    Subjective Information no complaints  -SG        Document Type evaluation;discharge evaluation/summary  -SG        Mode of Treatment occupational therapy  -SG        Patient Effort good  -SG           General Information    Patient Profile Reviewed? yes  -SG        Patient Observations alert;cooperative;agree to therapy  -SG        General Observations of Patient standing in bathroom brushing teeth  -SG        Prior Level of Function independent:;ADL's  -SG        Equipment Currently Used at Home none  -SG           Cognitive Assessment/Intervention- PT/OT    Orientation Status (Cognition) oriented x 4  -SG        Follows Commands (Cognition) WFL  -SG           Functional Mobility    Functional Mobility- Ind. Level supervision required  -SG        Functional Mobility- Comment ambulates from bathroom to bed  -SG           Transfer Assessment/Treatment    Transfer Assessment/Treatment sit-stand transfer;stand-sit transfer  -SG           Sit-Stand Transfer    Sit-Stand [place] (Transfers) supervision  -SG        Assistive Device (Sit-Stand Transfers) --   no assistive device  -SG           Stand-Sit Transfer    Stand-Sit [place] (Transfers) supervision  -SG           ADL Assessment/Intervention    BADL Assessment/Intervention upper body  dressing;lower body dressing;grooming  -SG           Upper Body Dressing Assessment/Training    Upper Body Dressing [place] Level doff;don;supervision;set up  -SG        Upper Body Dressing Position edge of bed sitting  -SG           Lower Body Dressing Assessment/Training    Lower Body Dressing [place] Level doff;don;socks;supervision  -        Lower Body Dressing Position edge of bed sitting;supported sitting  -SG           Grooming Assessment/Training    [place] Level (Grooming) grooming skills;wash face, hands;supervision;oral care regimen  -SG        Grooming Position --   standing at sink  -SG           General ROM    GENERAL ROM COMMENTS BUE's WFL AROM  -SG           Sensory Assessment/Intervention    Sensory General Assessment no sensation deficits identified   BUE's   -SG           Positioning and Restraints    Pre-Treatment Position bathroom  -SG        Post Treatment Position bed  -SG        In Bed call light within reach;encouraged to call for assist;exit alarm on;notified nsg  -SG           Pain Assessment    Additional Documentation Pain Scale: Numbers Pre/Post-Treatment (Group)  -SG           Pain Scale: Numbers Pre/Post-Treatment    Pain Scale: Numbers, Pretreatment 0/10 - no pain  -SG        Pain Scale: Numbers, Post-Treatment 0/10 - no pain  -SG           Clinical Impression (OT)    OT Diagnosis need for assist with personal care  -SG        Patient/Family Goals Statement (OT Eval) to go home   -SG           Patient Education Goal (OT)    Activity (Patient Education Goal, OT) pt to state safety for adls  -SG        [place]/Cues/Accuracy (Memory Goal 2, OT) verbalizes understanding  -SG        Time Frame (Patient Education Goal, OT) 1 day  -SG        Progress/Outcome (Patient Education Goal, OT) goal met  -SG          User Key  (r) = Recorded By, (t) = Taken By, (c) = Cosigned By    Initials Name Effective Dates    Rocio Hair, OTR 06/08/18 -           Occupational  Therapy Education     Title: PT OT SLP Therapies (Resolved)     Topic: Occupational Therapy (Resolved)     Point: ADL training (Resolved)     Description: Instruct learner(s) on proper safety adaptation and remediation techniques during self care or transfers.   Instruct in proper use of assistive devices.   Learning Progress Summary     Learner Status Readiness Method Response Comment Documented by    Patient Done Acceptance E,TB,D GABRIELLA VALERA safety for adls  08/23/18 1100                      User Key     Initials Effective Dates Name Provider Type Discipline     06/08/18 -  Rocio Angeles OTR Occupational Therapist OT                OT Recommendation and Plan     Plan of Care Review  Plan of Care Reviewed With: patient  Plan of Care Reviewed With: patient  Outcome Summary: Pt at SBA level for adls. BUE's WFL. Educated with safety for adls. Will not follow for further OT at this time. Pt agrees           OT Rehab Goals     Row Name 08/23/18 1053             Patient Education Goal (OT)    Activity (Patient Education Goal, OT) pt to state safety for adls  -SG      [place]/Cues/Accuracy (Memory Goal 2, OT) verbalizes understanding  -SG      Time Frame (Patient Education Goal, OT) 1 day  -SG      Progress/Outcome (Patient Education Goal, OT) goal met  -SG        User Key  (r) = Recorded By, (t) = Taken By, (c) = Cosigned By    Initials Name Provider Type Discipline     Rocio Angeles OTR Occupational Therapist OT                Outcome Measures     Row Name 08/23/18 1102             How much help from another is currently needed...    Putting on and taking off regular lower body clothing? 3  -SG      Bathing (including washing, rinsing, and drying) 3  -SG      Toileting (which includes using toilet bed pan or urinal) 3  -SG      Putting on and taking off regular upper body clothing 3  -SG      Taking care of personal grooming (such as brushing teeth) 3  -SG      Eating meals 4  -SG      Score 19  -SG          Functional Assessment    Outcome Measure Options AM-PAC 6 Clicks Daily Activity (OT)  -SG        User Key  (r) = Recorded By, (t) = Taken By, (c) = Cosigned By    Initials Name Provider Type    Rocio Hair OTR Occupational Therapist          Time Calculation:         Time Calculation- OT     Row Name 08/23/18 1103             Time Calculation- OT    OT Start Time 0900  -SG      OT Stop Time 0918  -SG      OT Time Calculation (min) 18 min  -SG      Total Timed Code Minutes- OT 10 minute(s)  -SG      OT Received On 08/23/18  -        User Key  (r) = Recorded By, (t) = Taken By, (c) = Cosigned By    Initials Name Provider Type    Rocio Hair OTR Occupational Therapist        Therapy Suggested Charges     Code   Minutes Charges    None           Therapy Charges for Today     Code Description Service Date Service Provider Modifiers Qty    33377702413 HC OT SELFCARE CURRENT 8/23/2018 Rocio Angeles OTR GO CK 1    37437771324 HC OT SELFCARE PROJECTED 8/23/2018 Rocio Angeles OTR GO CK 1    68949249110 HC OT SELFCARE DISCHARGE 8/23/2018 Rocio Angeles OTR GO CK 1    80187089446 HC OT EVAL LOW COMPLEXITY 2 8/23/2018 Rocio Angeles OTR GO 1    50581435869 HC OT SELF CARE/MGMT/TRAIN EA 15 MIN 8/23/2018 Rocio Angeles OTR GO 1          OT G-codes  OT Functional Scales Options: AM-PAC 6 Clicks Daily Activity (OT)  Functional Limitation: Self care  Self Care Current Status (): At least 40 percent but less than 60 percent impaired, limited or restricted  Self Care Goal Status (): At least 40 percent but less than 60 percent impaired, limited or restricted  Self Care Discharge Status (): At least 40 percent but less than 60 percent impaired, limited or restricted    OT Discharge Summary  Reason for Discharge: other (comment) (Pt denies need for further ot at this time. SBA for adls)    JACQUELINE Urias  8/23/2018

## 2018-08-23 NOTE — PLAN OF CARE
Problem: Patient Care Overview  Goal: Plan of Care Review  Outcome: Ongoing (interventions implemented as appropriate)   08/23/18 1101   OTHER   Outcome Summary Pt at SBA level for adls. ERICK's WFL. Educated with safety for adls. Will not follow for further OT at this time. Pt agrees   Coping/Psychosocial   Plan of Care Reviewed With patient

## 2018-08-23 NOTE — PROGRESS NOTES
"Daily progress note    Chief complaint  Doing much better  No new complaints    History of present illness  83-year-old white female with history of hypertension osteoarthritis carpal tunnel syndrome otherwise in good health admitted through emergency room with the right upper extremity numbness tingling for last 2-3 days.  Patient also have weakness of the hand .  Patient denies any headache or vision problem speech problem or right lower extremity complaint.  Patient workup in ER is essentially negative but she is admitted to rule out TIA.  Patient also denies any chest pain shortness of breath abdominal pain nausea vomiting diarrhea.     REVIEW OF SYSTEMS  Review of Systems   Constitutional: Positive for fatigue. Negative for activity change, appetite change, diaphoresis and fever.   HENT: Positive for sinus pressure. Negative for trouble swallowing.    Eyes: Negative for visual disturbance.   Respiratory: Negative for cough, chest tightness, shortness of breath and wheezing.    Cardiovascular: Negative for chest pain, palpitations and leg swelling.   Gastrointestinal: Negative for abdominal pain, diarrhea, nausea and vomiting.   Genitourinary: Negative for dysuria.   Musculoskeletal: Positive for neck pain. Negative for arthralgias, back pain, gait problem and neck stiffness.   Skin: Negative for rash.   Neurological: Positive for numbness (rue) and headaches. Negative for dizziness, tremors, syncope, facial asymmetry, speech difficulty and light-headedness.      PHYSICAL EXAM  Blood pressure 132/85, pulse 72, temperature 97.7 °F (36.5 °C), temperature source Oral, resp. rate 18, height 165.1 cm (65\"), weight 80.8 kg (178 lb 1.6 oz), SpO2 98 %.    Constitutional: She is oriented to person, place, and time and well-developed, well-nourished, and in no distress. She appears to not be writhing in pain and not dehydrated. She appears healthy. She does not have a sickly appearance. No distress.   Head: " Normocephalic and atraumatic.   Mouth/Throat: Uvula is midline, oropharynx is clear and moist and mucous membranes are normal. No trismus in the jaw.   Eyes: Pupils are equal, round, and reactive to light. EOM are normal.   Neck: Normal range of motion. Neck supple. No spinous process tenderness and no muscular tenderness present. Normal range of motion present. No Brudzinski's sign and no Kernig's sign noted.   Cardiovascular: Normal rate, regular rhythm, S1 normal, S2 normal and normal heart sounds.  Exam reveals no gallop and no friction rub.    No murmur heard.  Pulmonary/Chest: Effort normal and breath sounds normal. She has no decreased breath sounds. She has no wheezes. She has no rhonchi. She has no rales.   Abdominal: Soft. Normal appearance and bowel sounds are normal. There is no tenderness. There is no rebound and no guarding.   Musculoskeletal: Normal range of motion.   Neurological: She is alert and oriented to person, place, and time.   Cranial nerves 2-12 intact as tested.  Sensation intact.  5/5 strength in all extremities.  Normal cerebellar testing.  No drift in any extremity.  No dysarthria.  No aphasia.  No neglect/extinction.   Skin: Skin is warm, dry and intact. She is not diaphoretic. There is pallor.   Psychiatric: Affect and judgment normal.     LAB RESULTS  Lab Results (last 24 hours)     Procedure Component Value Units Date/Time    Basic Metabolic Panel [[id number]]  (Abnormal) Collected:  08/23/18 0437    Specimen:  Blood Updated:  08/23/18 0546     Glucose 82 mg/dL      BUN 7 (L) mg/dL      Creatinine 0.65 mg/dL      Sodium 138 mmol/L      Potassium 4.0 mmol/L      Chloride 107 mmol/L      CO2 26.5 mmol/L      Calcium 8.6 mg/dL      eGFR Non African Amer 87 mL/min/1.73      BUN/Creatinine Ratio 10.8     Anion Gap 4.5 mmol/L     Narrative:       The MDRD GFR formula is only valid for adults with stable renal function between ages 18 and 70.    CBC & Differential [[id number]] Collected:   08/23/18 0437    Specimen:  Blood Updated:  08/23/18 0524    Narrative:       The following orders were created for panel order CBC & Differential.  Procedure                               Abnormality         Status                     ---------                               -----------         ------                     CBC Auto Differential[[id number]]        Abnormal            Final result                 Please view results for these tests on the individual orders.    CBC Auto Differential [[id number]]  (Abnormal) Collected:  08/23/18 0437    Specimen:  Blood Updated:  08/23/18 0524     WBC 4.91 10*3/mm3      RBC 4.11 10*6/mm3      Hemoglobin 12.1 g/dL      Hematocrit 38.5 %      MCV 93.7 fL      MCH 29.4 pg      MCHC 31.4 (L) g/dL      RDW 12.9 %      RDW-SD 44.2 fl      MPV 9.9 fL      Platelets 205 10*3/mm3      Neutrophil % 42.1 (L) %      Lymphocyte % 42.6 %      Monocyte % 12.0 %      Eosinophil % 2.9 %      Basophil % 0.4 %      Immature Grans % 0.0 %      Neutrophils, Absolute 2.07 10*3/mm3      Lymphocytes, Absolute 2.09 10*3/mm3      Monocytes, Absolute 0.59 10*3/mm3      Eosinophils, Absolute 0.14 10*3/mm3      Basophils, Absolute 0.02 10*3/mm3      Immature Grans, Absolute 0.00 10*3/mm3         Imaging Results (last 24 hours)     Procedure Component Value Units Date/Time    MRI Cervical Spine With & Without Contrast [[id number]] Collected:  08/22/18 2040     Updated:  08/22/18 2047    Narrative:       MRI CERVICAL SPINE W WO CONTRAST-     CLINICAL HISTORY: Neck pain. Right arm numbness and weakness.     TECHNIQUE: Sagittal T1, T2 and proton-density-weighted images were  obtained. Axial T1 and T2-weighted images were also acquired. Sagittal  and axial T1-weighted images were obtained after intravenous injection  of MultiHance contrast.      COMPARISON: CT scan of the cervical spine dated 8/21/2018.     FINDINGS: At C2-C3 and C3-C4 the discs appear normal. There is no disc  bulging or focal disc  herniation or spinal canal or foraminal stenosis.     At C4-C5 and C5-C6 and C6-C7 there is minimal disc bulging that results  in no encroachment on the spinal canal and neural foramina.     At C7 as T1 the disc appears normal.     There is slight straightening of the normal cervical lordosis. The  alignment is otherwise unremarkable. There is no significant abnormal  signal within the bone marrow. Postcontrast images demonstrate no  abnormal enhancement in the bone marrow or subarachnoid space.       Impression:       Essentially unremarkable MRI of the cervical spine,  particularly for a patient of this age. There is no significant disc  bulging or focal disc herniation or encroachment on the spinal canal at  any level.     This report was finalized on 8/22/2018 8:44 PM by Dr. Mateo Aguillon M.D.       MRI Brain With & Without Contrast [[id number]] Collected:  08/22/18 2033     Updated:  08/22/18 2044    Narrative:       MRI BRAIN W WO CONTRAST-     CLINICAL HISTORY: 83-year-old female with right arm numbness and  weakness. Headaches.     TECHNIQUE: Multiple axial T1, T2, gradient echo and diffusion images  were acquired. Axial and coronal and sagittal T1-weighted images were  also obtained after intravenous injection of MultiHance contrast.      COMPARISON: CT head dated 8/21/2018.     FINDINGS: There are multiple patchy nodular foci of T2 hyperintensity  scattered throughout the white matter of both cerebral hemispheres, most  of which show no corresponding restricted diffusion. These are  consistent with sequela of extensive small vessel chronic ischemic  change. There are 2 adjacent punctate foci of restricted diffusion in  the subcortical white matter along the course of the central sulcus that  are consistent with tiny acute white matter infarcts. There is also a  punctate cortical infarct in the left parietal lobe. No other foci of  restricted diffusion are identified. There are no foci of  abnormal  enhancement within the brain or brainstem. There are no masses. Normal  flow voids are observed in the major vascular structures. The paranasal  sinuses are well aerated.       Impression:       Abnormal signal intensity in white matter of both cerebral  hemispheres consistent with sequela of extensive small vessel chronic  ischemic change. There are 3 very tiny punctate foci of acute ischemic  infarction in the subcortical white matter of the left frontal lobe and  also in the cortex of the left parietal lobe.     This report was finalized on 8/22/2018 8:39 PM by Dr. Mateo Aguillon M.D.           EKG                             Rhythm/Rate: NSR rate 76  P waves and NM: Normal P waves  QRS, axis: Normal QRS   ST and T waves: Normal ST       Current Facility-Administered Medications:   •  acetaminophen (TYLENOL) tablet 650 mg, 650 mg, Oral, Q6H PRN, Matt Morales MD  •  amLODIPine (NORVASC) tablet 5 mg, 5 mg, Oral, Q24H, Matt Morales MD, 5 mg at 08/23/18 0836  •  [START ON 8/24/2018] aspirin EC tablet 81 mg, 81 mg, Oral, Daily, Maddie Gonzalez APRN  •  atorvastatin (LIPITOR) tablet 40 mg, 40 mg, Oral, Nightly, Anna Cao MD, 40 mg at 08/22/18 2055  •  cholecalciferol (VITAMIN D3) tablet 1,000 Units, 1,000 Units, Oral, Daily, Matt Morales MD, 1,000 Units at 08/23/18 0837  •  clopidogrel (PLAVIX) tablet 75 mg, 75 mg, Oral, Daily, Maddie Gonzalez APRN, 75 mg at 08/23/18 1319  •  cyanocobalamin injection 1,000 mcg, 1,000 mcg, Intramuscular, Weekly, Maddie Gonzalez APRN, 1,000 mcg at 08/23/18 1319  •  fluticasone (FLONASE) 50 MCG/ACT nasal spray 2 spray, 2 spray, Each Nare, Daily, Matt Morales MD  •  lisinopril (PRINIVIL,ZESTRIL) tablet 40 mg, 40 mg, Oral, Q12H, Matt Morales MD, 40 mg at 08/23/18 0837  •  pantoprazole (PROTONIX) EC tablet 40 mg, 40 mg, Oral, Q AM, Matt Morales MD, 40 mg at 08/23/18 0513  •  Insert peripheral IV, , , Once **AND** sodium chloride 0.9 % flush 10  mL, 10 mL, Intravenous, PRN, Ria Fuller, NAHID  •  sodium chloride 0.9 % infusion, 75 mL/hr, Intravenous, Continuous, Emile Morales MD, Last Rate: 75 mL/hr at 08/23/18 0653, 75 mL/hr at 08/23/18 0653     ASSESSMENT  Left small multiple cerebral infarcts  Hypertension  Osteoarthritis  Carpal tunnel syndrome    PLAN  CPM  Aspirin Plavix and Lipitor  IV fluid  Neuro consult appreciated  Stroke education  Continue home medication and adjust the doses  Stress ulcer DVT prophylaxis  PTOT  Supportive care  Discussed with family  Follow closely further recommendation chronic hospital course    EMILE MORALES MD

## 2018-08-23 NOTE — PROGRESS NOTES
"DOS: 2018  NAME: Katja Klein   : 1935  PCP: Opal Marina MD  Chief Complaint   Patient presents with   • Numbness     RIGHT ARM   • Neck Pain     CC: Stroke    Subjective: No new events overnight per RN. Patient denies vision changes, weakness, headache or any new stroke/TIA symptoms. No family at bedside currently.     Interval History  History taken from: patient chart RN    Objective:  Vital signs: /69 (BP Location: Right arm, Patient Position: Lying)   Pulse 64   Temp 97.7 °F (36.5 °C) (Oral)   Resp 18   Ht 165.1 cm (65\")   Wt 80.8 kg (178 lb 1.6 oz)   SpO2 96%   BMI 29.64 kg/m²       Physical Exam:  GENERAL: NAD  HEENT: Normocephalic, atraumatic   COR: RRR  Resp: Even and unlabored  Extremities: No signs of distal embolization. TEDs and SCDs in place.    Neurological:   MS: AO. Language normal. No neglect. Higher integrative function normal  CN: II-XII normal  Motor: Normal strength and tone throughout.  Sensory: Intact  Coordination: Normal    Results Review:     I reviewed the patient's new clinical results.    Current Medications:  Scheduled Medications:  amLODIPine 5 mg Oral Q24H   aspirin 325 mg Oral Daily   atorvastatin 40 mg Oral Nightly   cholecalciferol 1,000 Units Oral Daily   fluticasone 2 spray Each Nare Daily   lisinopril 40 mg Oral Q12H   pantoprazole 40 mg Oral Q AM     Infusions:   sodium chloride 75 mL/hr Last Rate: 75 mL/hr (18 0653)     PRN Medications:  •  acetaminophen  •  Insert peripheral IV **AND** sodium chloride    Medications Reviewed: Changes made.    Laboratory results:  Lab Results   Component Value Date    GLUCOSE 82 2018    CALCIUM 8.6 2018     2018    K 4.0 2018    CO2 26.5 2018     2018    BUN 7 (L) 2018    CREATININE 0.65 2018    EGFRIFNONA 87 2018    BCR 10.8 2018    ANIONGAP 4.5 2018     Lab Results   Component Value Date    WBC 4.91 2018    HGB 12.1 " 2018    HCT 38.5 2018    MCV 93.7 2018     2018        Results from last 7 days  Lab Units 18  0844   CHOLESTEROL mg/dL 155     No results found for: INR, PROTIME  Lab Results   Component Value Date    TSH 2.810 2018     Lab Results   Component Value Date    HGBA1C 5.30 2018     Lab Results   Component Value Date    CHOL 155 2018    TRIG 68 2018    HDL 68 (H) 2018    LDL 73 2018      Lab Results   Component Value Date    SFREVMEF41 160 (L) 2018     Review and interpretation of imaging:  CT cervical spine 18: FINDINGS: The CT scan was performed as an emergency procedure through  the cervical spine and demonstrates followin. The foramen magnum appears normal. There is some degenerative  spurring involving the odontoid and anterior arch of C1. This does not  affect the spinal canal.  2. At C2-3 there is a minimal central disc bulge and slight right  lateral facet spurring. There is no central or foraminal encroachment.  3. At C3-4 there is a slight posterior disc osteophyte complex. There is  moderate spurring of the right uncovertebral joint and right lateral  facet resulting in moderate right foraminal encroachment.  4. At C4-5 there is a mild posterior disc osteophyte complex. There is  spurring of the left and right lateral facets and right uncovertebral  joint and resulting in moderate right and slight left foraminal  encroachment.  5. At C5-6 there is a slight posterior disc osteophyte complex. There is  some spurring of the right lateral facet and right uncovertebral joint  resulting in borderline right foraminal encroachment.  6. At C6-7 there is mild posterior disc osteophyte complex. There is  spurring of the uncovertebral joints with borderline left foraminal  encroachment.  7. At C7-T1 and T1-2 and T2-3 levels are unremarkable.  MRI brain 18: IMPRESSION:  Abnormal signal intensity in white matter of both  cerebral  hemispheres consistent with sequela of extensive small vessel chronic  ischemic change. There are 3 very tiny punctate foci of acute ischemic  infarction in the subcortical white matter of the left frontal lobe and  also in the cortex of the left parietal lobe.  MRI cervical spine 8/22/18: IMPRESSION:  Essentially unremarkable MRI of the cervical spine,  particularly for a patient of this age. There is no significant disc  bulging or focal disc herniation or encroachment on the spinal canal at  any level.  CT, CTA h/n 8/22/18: FINDINGS:  Axial images were obtained through the brain without  intravenous contrast. There is moderate diffuse atrophy. There is  decreased attenuation of the periventricular white matter bilaterally  consistent with small vessel white matter ischemic disease.  There is no evidence of acute infarction, hemorrhage, midline shift or  mass effect.  Following the intravenous contrast injection, CT angiography was  performed through the head and neck. Sagittal, coronal and 3D  reconstruction images were reviewed.  NASCET criteria was used.  There is mild atherosclerotic calcification at the right carotid bulb  with approximately 20% diameter stenosis. No significant stenosis is  seen on the left. There is tortuosity of the bilateral internal carotid  arteries most severe on the right. Atretic A1 segment of the left  anterior cerebral artery is seen. Bilateral middle cerebral and the  right anterior cerebral artery are patent. The left anterior cerebral  artery also appears patent with a patent anterior communicating artery.  The bilateral vertebral arteries appear patent. There is a small P1  segment of the left posterior cerebral artery with a mildly prominent  posterior communicating artery on the left. The bilateral posterior  cerebral arteries are otherwise patent.  IMPRESSION:  1. No acute process identified on CT of the brain with and without  contrast.  2. No acute process  demonstrated on CT angiography of the head and neck    EKG 8/21/18: SR  TTE 8/22/18:Echocardiogram Findings    Left Ventricle Left ventricular systolic function is normal. Estimated EF = 65%. Normal left ventricular cavity size and wall thickness noted. All left ventricular wall segments contract normally. Septal wall motion is normal. Left ventricular diastolic dysfunction is noted (grade I) consistent with impaired relaxation. Normal left atrial pressure.      Right Ventricle Normal right ventricular cavity size, wall thickness, systolic function and septal motion noted.      Left Atrium Normal left atrial size and volume noted. There is no spontaneous echo contrast present. The interatrial septum does not appear to be redundant. No interatrial septal aneurysm present. No lipomatous hypertrophy of the interatrial septum present. Saline test results are positive for right to left atrial level shunt.      Right Atrium Normal right atrial size noted.      Aortic Valve The aortic valve is structurally normal. No aortic valve regurgitation is present. No aortic valve stenosis is present.      Mitral Valve The mitral valve is normal in structure. No mitral valve regurgitation is present. No significant mitral valve stenosis is present.      Tricuspid Valve The tricuspid valve is normal. No tricuspid valve stenosis is present. No tricuspid valve regurgitation is present.      Pulmonic Valve The pulmonic valve is structurally normal. There is no significant pulmonic valve stenosis present. There is no pulmonic valve regurgitation present.      Greater Vessels No dilation of the aortic root is present.      Pericardium The pericardium is normal. There is no evidence of pericardial effusion.        Impression: Ms. Klein is an 82 yo with HTN, osteoarthritis and CTS who presented on 8/22/18 with right arm numbness, tingling and weakness that have since resolved.  I dicussed her imaging with Dr. Cao that shows small  punctate infarcts in the left hemisphere. He vessel imaging shows areas of atherosclerosis but no significant stenosis. MRI neck with no cord impingement. Her TTE results are noted above, unrevealing. Recommend outpatient prolonged cardiac monitoring. Her B12 is low, replace as ordered. Continue DAPT (ASA + Plavix) for three months followed by Plavix alone. Continue Lipitor 40 mg. No further neurologic work-up at this time. She will need to follow-up in stroke clinic in 3 months, sooner if symptoms warrant. Please call with questions or concerns.     Plan:  - Stroke labs: B12 is low, 1000 mcg IM weekly x 4 weeks, then monthly  - ASA + Plavix for 3 months  - Monoplatelet therapy with Plavix after 3 months  - Lipitor 40 mg  - Telemetry   - PTOT speech rehabilitation assessment  - DVT prophylaxis  - Cardiac key (Holter) to monitor heart for arrhythmias at discharge  Secondary stroke prevention: Ideal targets for stroke prevention would be Blood pressure < 130/80; B12 > 500 TSH in normal range and LDL < 70; HbA1c < 6.5 and smoking cessation if applicable.    Call [phone] for stroke/TIA symptoms  F/U in stroke clinic in 3 months, call [phone] for an appointment    I have discussed the above with Dr. Cao, RN, patient and family.  Maddie Gonzalez, APRN  08/23/18  10:20 AM      Active Problems:    Stroke-like symptoms

## 2018-08-24 ENCOUNTER — APPOINTMENT (OUTPATIENT)
Dept: CARDIOLOGY | Facility: HOSPITAL | Age: 83
End: 2018-08-24
Attending: HOSPITALIST

## 2018-08-24 VITALS
HEART RATE: 70 BPM | TEMPERATURE: 97.5 F | OXYGEN SATURATION: 99 % | HEIGHT: 65 IN | SYSTOLIC BLOOD PRESSURE: 126 MMHG | DIASTOLIC BLOOD PRESSURE: 74 MMHG | WEIGHT: 178.1 LBS | BODY MASS INDEX: 29.67 KG/M2 | RESPIRATION RATE: 16 BRPM

## 2018-08-24 LAB
ANION GAP SERPL CALCULATED.3IONS-SCNC: 6.7 MMOL/L
BUN BLD-MCNC: 10 MG/DL (ref 8–23)
BUN/CREAT SERPL: 14.9 (ref 7–25)
CALCIUM SPEC-SCNC: 9.1 MG/DL (ref 8.6–10.5)
CHLORIDE SERPL-SCNC: 104 MMOL/L (ref 98–107)
CO2 SERPL-SCNC: 27.3 MMOL/L (ref 22–29)
CREAT BLD-MCNC: 0.67 MG/DL (ref 0.57–1)
GFR SERPL CREATININE-BSD FRML MDRD: 84 ML/MIN/1.73
GLUCOSE BLD-MCNC: 98 MG/DL (ref 65–99)
POTASSIUM BLD-SCNC: 4.2 MMOL/L (ref 3.5–5.2)
SODIUM BLD-SCNC: 138 MMOL/L (ref 136–145)

## 2018-08-24 PROCEDURE — 80048 BASIC METABOLIC PNL TOTAL CA: CPT | Performed by: HOSPITALIST

## 2018-08-24 PROCEDURE — 0296T HC EXT ECG > 48HR TO 21 DAY RCRD W/CONECT INTL RCRD: CPT

## 2018-08-24 RX ORDER — ATORVASTATIN CALCIUM 40 MG/1
40 TABLET, FILM COATED ORAL NIGHTLY
Qty: 30 TABLET | Refills: 0 | Status: SHIPPED | OUTPATIENT
Start: 2018-08-24 | End: 2018-09-23

## 2018-08-24 RX ORDER — CLOPIDOGREL BISULFATE 75 MG/1
75 TABLET ORAL DAILY
Qty: 30 TABLET | Refills: 0 | Status: SHIPPED | OUTPATIENT
Start: 2018-08-25 | End: 2018-09-24

## 2018-08-24 RX ORDER — ASPIRIN 81 MG/1
81 TABLET ORAL DAILY
Qty: 30 TABLET | Refills: 0 | Status: SHIPPED | OUTPATIENT
Start: 2018-08-25 | End: 2018-09-24

## 2018-08-24 RX ORDER — AMLODIPINE BESYLATE 10 MG/1
10 TABLET ORAL
Qty: 30 TABLET | Refills: 0 | Status: SHIPPED | OUTPATIENT
Start: 2018-08-25 | End: 2018-09-24

## 2018-08-24 RX ORDER — CHOLECALCIFEROL (VITAMIN D3) 125 MCG
1000 CAPSULE ORAL DAILY
Status: DISCONTINUED | OUTPATIENT
Start: 2018-08-24 | End: 2018-08-24 | Stop reason: HOSPADM

## 2018-08-24 RX ORDER — CHOLECALCIFEROL (VITAMIN D3) 125 MCG
500 CAPSULE ORAL DAILY
Status: DISCONTINUED | OUTPATIENT
Start: 2018-08-24 | End: 2018-08-24

## 2018-08-24 RX ADMIN — PANTOPRAZOLE SODIUM 40 MG: 40 TABLET, DELAYED RELEASE ORAL at 06:38

## 2018-08-24 RX ADMIN — ASPIRIN 81 MG: 81 TABLET, FILM COATED ORAL at 08:35

## 2018-08-24 RX ADMIN — AMLODIPINE BESYLATE 10 MG: 10 TABLET ORAL at 08:34

## 2018-08-24 RX ADMIN — LISINOPRIL 40 MG: 40 TABLET ORAL at 08:35

## 2018-08-24 RX ADMIN — VITAMIN D, TAB 1000IU (100/BT) 1000 UNITS: 25 TAB at 08:35

## 2018-08-24 RX ADMIN — CLOPIDOGREL 75 MG: 75 TABLET, FILM COATED ORAL at 08:35

## 2018-08-24 NOTE — PROGRESS NOTES
"Daily progress note    Chief complaint  Doing better  No new complaints    History of present illness  83-year-old white female with history of hypertension osteoarthritis carpal tunnel syndrome otherwise in good health admitted through emergency room with the right upper extremity numbness tingling for last 2-3 days.  Patient also have weakness of the hand .  Patient denies any headache or vision problem speech problem or right lower extremity complaint.  Patient workup in ER is essentially negative but she is admitted to rule out TIA.  Patient also denies any chest pain shortness of breath abdominal pain nausea vomiting diarrhea.     REVIEW OF SYSTEMS  Review of Systems   Constitutional: Positive for fatigue. Negative for activity change, appetite change, diaphoresis and fever.   HENT: Positive for sinus pressure. Negative for trouble swallowing.    Eyes: Negative for visual disturbance.   Respiratory: Negative for cough, chest tightness, shortness of breath and wheezing.    Cardiovascular: Negative for chest pain, palpitations and leg swelling.   Gastrointestinal: Negative for abdominal pain, diarrhea, nausea and vomiting.   Genitourinary: Negative for dysuria.   Musculoskeletal: Positive for neck pain. Negative for arthralgias, back pain, gait problem and neck stiffness.   Skin: Negative for rash.   Neurological: Positive for numbness (rue) and headaches. Negative for dizziness, tremors, syncope, facial asymmetry, speech difficulty and light-headedness.      PHYSICAL EXAM  Blood pressure 126/74, pulse 70, temperature 97.5 °F (36.4 °C), temperature source Oral, resp. rate 16, height 165.1 cm (65\"), weight 80.8 kg (178 lb 1.6 oz), SpO2 99 %.    Constitutional: She is oriented to person, place, and time and well-developed, well-nourished, and in no distress. She appears to not be writhing in pain and not dehydrated. She appears healthy. She does not have a sickly appearance. No distress.   Head: Normocephalic " and atraumatic.   Mouth/Throat: Uvula is midline, oropharynx is clear and moist and mucous membranes are normal. No trismus in the jaw.   Eyes: Pupils are equal, round, and reactive to light. EOM are normal.   Neck: Normal range of motion. Neck supple. No spinous process tenderness and no muscular tenderness present. Normal range of motion present. No Brudzinski's sign and no Kernig's sign noted.   Cardiovascular: Normal rate, regular rhythm, S1 normal, S2 normal and normal heart sounds.  Exam reveals no gallop and no friction rub.    No murmur heard.  Pulmonary/Chest: Effort normal and breath sounds normal. She has no decreased breath sounds. She has no wheezes. She has no rhonchi. She has no rales.   Abdominal: Soft. Normal appearance and bowel sounds are normal. There is no tenderness. There is no rebound and no guarding.   Musculoskeletal: Normal range of motion.   Neurological: She is alert and oriented to person, place, and time.   Cranial nerves 2-12 intact as tested.  Sensation intact.  5/5 strength in all extremities.  Normal cerebellar testing.  No drift in any extremity.  No dysarthria.  No aphasia.  No neglect/extinction.   Skin: Skin is warm, dry and intact. She is not diaphoretic. There is pallor.   Psychiatric: Affect and judgment normal.     LAB RESULTS  Lab Results (last 24 hours)     Procedure Component Value Units Date/Time    Basic Metabolic Panel [[id number]] Collected:  08/24/18 0551    Specimen:  Blood Updated:  08/24/18 0709     Glucose 98 mg/dL      BUN 10 mg/dL      Creatinine 0.67 mg/dL      Sodium 138 mmol/L      Potassium 4.2 mmol/L      Chloride 104 mmol/L      CO2 27.3 mmol/L      Calcium 9.1 mg/dL      eGFR Non African Amer 84 mL/min/1.73      BUN/Creatinine Ratio 14.9     Anion Gap 6.7 mmol/L     Narrative:       The MDRD GFR formula is only valid for adults with stable renal function between ages 18 and 70.        Imaging Results (last 24 hours)     ** No results found for the last  24 hours. **        EKG                             Rhythm/Rate: NSR rate 76  P waves and NH: Normal P waves  QRS, axis: Normal QRS   ST and T waves: Normal ST       Current Facility-Administered Medications:   •  acetaminophen (TYLENOL) tablet 650 mg, 650 mg, Oral, Q6H PRN, Emile Morales MD  •  amLODIPine (NORVASC) tablet 10 mg, 10 mg, Oral, Q24H, Emile Morales MD, 10 mg at 08/24/18 0834  •  aspirin EC tablet 81 mg, 81 mg, Oral, Daily, Maddie Gonzalez APRN, 81 mg at 08/24/18 0835  •  atorvastatin (LIPITOR) tablet 40 mg, 40 mg, Oral, Nightly, Anna Cao MD, 40 mg at 08/23/18 2057  •  cholecalciferol (VITAMIN D3) tablet 1,000 Units, 1,000 Units, Oral, Daily, Emile Morales MD, 1,000 Units at 08/24/18 0835  •  clopidogrel (PLAVIX) tablet 75 mg, 75 mg, Oral, Daily, Maddie Gonzalez APRN, 75 mg at 08/24/18 0835  •  cyanocobalamin injection 1,000 mcg, 1,000 mcg, Intramuscular, Weekly, Maddie Gonzalez APRN, 1,000 mcg at 08/23/18 1319  •  fluticasone (FLONASE) 50 MCG/ACT nasal spray 2 spray, 2 spray, Each Nare, Daily, Emile Morales MD, 2 spray at 08/23/18 2056  •  lisinopril (PRINIVIL,ZESTRIL) tablet 40 mg, 40 mg, Oral, Q12H, Emile Morales MD, 40 mg at 08/24/18 0835  •  pantoprazole (PROTONIX) EC tablet 40 mg, 40 mg, Oral, Q AM, Emile Morales MD, 40 mg at 08/24/18 0638  •  Insert peripheral IV, , , Once **AND** sodium chloride 0.9 % flush 10 mL, 10 mL, Intravenous, PRN, Ria Fuller APRN  •  sodium chloride 0.9 % infusion, 75 mL/hr, Intravenous, Continuous, Emile Morales MD, Last Rate: 75 mL/hr at 08/23/18 0653, 75 mL/hr at 08/23/18 0653     ASSESSMENT  Left small multiple cerebral infarcts  Hypertension  Osteoarthritis  Carpal tunnel syndrome    PLAN  Discharge home  Discharge summary dictated     EMILE MORALES MD

## 2018-08-24 NOTE — DISCHARGE SUMMARY
Discharge summary    Date of admission 8/21/2018  Date of discharge 8/24/2017    Final diagnosis  Left small multiple cerebral infarcts  Hypertension  Osteoarthritis  Carpal tunnel syndrome    Discharge medications    Current Facility-Administered Medications:   •  amLODIPine (NORVASC) tablet 10 mg, 10 mg, Oral, Q24H, Matt Morales MD, 10 mg at 08/24/18 0834  •  aspirin EC tablet 81 mg, 81 mg, Oral, Daily, Maddie Gonzalez APRN, 81 mg at 08/24/18 0835  •  atorvastatin (LIPITOR) tablet 40 mg, 40 mg, Oral, Nightly, Anna Cao MD, 40 mg at 08/23/18 2057  •  cholecalciferol (VITAMIN D3) tablet 1,000 Units, 1,000 Units, Oral, Daily, Matt Morales MD, 1,000 Units at 08/24/18 0835  •  clopidogrel (PLAVIX) tablet 75 mg, 75 mg, Oral, Daily, Maddie Gonzalez APRN, 75 mg at 08/24/18 0835  •  fluticasone (FLONASE) 50 MCG/ACT nasal spray 2 spray, 2 spray, Each Nare, Daily, Matt Morales MD, 2 spray at 08/23/18 2056  •  lisinopril (PRINIVIL,ZESTRIL) tablet 40 mg, 40 mg, Oral, Q12H, Matt Morales MD, 40 mg at 08/24/18 0835  •  pantoprazole (PROTONIX) EC tablet 40 mg, 40 mg, Oral, Q AM, Matt Morales MD, 40 mg at 08/24/18 0638  •  Insert peripheral IV, , , Once **AND** sodium chloride 0.9 % flush 10 mL, 10 mL, Intravenous, PRN, Ria Fuller APRN  •  vitamin B-12 (CYANOCOBALAMIN) tablet 1,000 mcg, 1,000 mcg, Oral, Daily, Matt Morales MD     Consult obtained  Neurology    Procedures  None    Hospital course  83-year-old white female with history of hypertension otherwise in good health admitted through emergency room with right upper extremity numbness tingling and weakness.  Patient admitted her workup revealed a small multiple left cerebral infarcts.  Patient started on aspirin and Lipitor and then Plavix added.  Patient followed by neurology.  Patient 2-D echo within normal limits and neurology recommend long-term cardiac monitoring at the time of discharge.  Patient will continue aspirin and Plavix for  3 months and then single platelet treatment.  Patient's lipid profile okay but she remain on Lipitor.  Patient symptoms completely resolved and wants to go home.  She also started on B12 supplement    Discharge diet regular    Activity as tolerated    Medication as above    Follow-up with primary doctor in 1 week and follow with neurology per the instruction and take medication as directed    EMILE MORENO MD

## 2018-08-24 NOTE — PLAN OF CARE
Problem: Patient Care Overview  Goal: Plan of Care Review  Outcome: Ongoing (interventions implemented as appropriate)   08/24/18 4462   Plan of Care Review   Progress improving   OTHER   Outcome Summary No neuro symptoms.   Coping/Psychosocial   Plan of Care Reviewed With patient       Problem: Stroke (Ischemic) (Adult)  Goal: Signs and Symptoms of Listed Potential Problems Will be Absent, Minimized or Managed (Stroke)  Outcome: Ongoing (interventions implemented as appropriate)

## 2018-08-24 NOTE — PROGRESS NOTES
Case Management Discharge Note    Final Note: Home    Destination     No service has been selected for the patient.      Durable Medical Equipment     No service has been selected for the patient.      Dialysis/Infusion     No service has been selected for the patient.      Home Medical Care     No service has been selected for the patient.      Social Care     No service has been selected for the patient.             Final Discharge Disposition Code: 01 - home or self-care

## 2018-09-05 ENCOUNTER — TELEPHONE (OUTPATIENT)
Dept: OBSTETRICS AND GYNECOLOGY | Facility: CLINIC | Age: 83
End: 2018-09-05

## 2018-09-05 DIAGNOSIS — Z78.0 POST-MENOPAUSAL: ICD-10-CM

## 2018-09-05 DIAGNOSIS — Z12.31 VISIT FOR SCREENING MAMMOGRAM: Primary | ICD-10-CM

## 2018-09-05 DIAGNOSIS — M81.0 AGE-RELATED OSTEOPOROSIS WITHOUT CURRENT PATHOLOGICAL FRACTURE: ICD-10-CM

## 2018-09-05 DIAGNOSIS — Z13.820 SCREENING FOR OSTEOPOROSIS: ICD-10-CM

## 2018-09-05 PROCEDURE — 0298T HOLTER MONITOR - 72 HOUR UP TO 21 DAY: CPT | Performed by: INTERNAL MEDICINE

## 2018-09-18 ENCOUNTER — TELEPHONE (OUTPATIENT)
Dept: NEUROLOGY | Facility: CLINIC | Age: 83
End: 2018-09-18

## 2018-09-18 NOTE — TELEPHONE ENCOUNTER
Two Week Stroke Phone Call    Spoke with the patient.     · Admission Date: 8/21/2018    · Discharge Date: 8/24/2018    · Discharge Destination: Home    · Meds reviewed with patient/caregiver?     [x]Yes [] No   o Antiplatelet: Aspirin, Plavix  o Understands purpose   [x]  Yes     []  No    o Understands how to take    []  Yes     []  No    o Cholesterol Reducing: Lipitor  o Understands purpose   [x]  Yes     []  No    o Understands how to take    [x]  Yes     []  No     · Is the patient taking all medication as directed?    [x]  Yes  []  No    · Discussed personal risk factors     [x]  Yes []  No    o High blood pressure   Reviewed any medications ordered for high blood pressure  Patient has not been monitoring BP, educated patient on the the importance of monitoring BP  Bp goal <130/80    • Discussed signs and symptoms of stroke and when patient to call 911?      [x]  Yes []  No  o Sudden weakness or numbness of the face, arm, or leg especially on one side of the body  o Sudden confusion, trouble speaking or understanding  o Sudden trouble seeing in one or both eyes   o Sudden trouble walking, dizziness, loss of balance or coordination  o Sudden severe headaches with no known cause      Notified Patient that if any of these symptoms occur to call 911    · Does the patient have any new signs or symptoms of a stroke?     []  Yes     [x]  No    · Does the patietnt have an appointment with PCP?    [x]  Yes     []  No  · Does the patient have 3 month Stroke Clinic appointment?   Office will call to make    · Is the patient currently in therapy, outpatient, or home health?    []  Yes     [x]  No    Needs a referral?  []  Yes     [x]  No      Patient Satisfaction     · Was the patient happy with the stroke care that was provided at [facility]?   [x]  Yes     []  No  · Does the patient feel they were kept up to date with their plan of care?   [x]  Yes     []  No     · Does the patient  feel they had to wait  too long for tests to be completed or for results to be communicated to them?    []  Yes     [x]  No    · What suggestions does the patient have of ways to improve the care to stroke patients?  No suggestions at this time.       Patient had requested MRI results to sent to her PCP. Results were faxed to Dr. Marina office to [phone].

## 2018-10-18 ENCOUNTER — HOSPITAL ENCOUNTER (OUTPATIENT)
Dept: MAMMOGRAPHY | Facility: HOSPITAL | Age: 83
Discharge: HOME | End: 2018-10-18
Attending: OBSTETRICS & GYNECOLOGY | Admitting: OBSTETRICS & GYNECOLOGY

## 2018-10-18 ENCOUNTER — HOSPITAL ENCOUNTER (OUTPATIENT)
Dept: BONE DENSITY | Facility: HOSPITAL | Age: 83
Discharge: HOME | End: 2018-10-18
Attending: OBSTETRICS & GYNECOLOGY

## 2018-10-18 DIAGNOSIS — Z78.0 POST-MENOPAUSAL: ICD-10-CM

## 2018-10-18 DIAGNOSIS — Z12.31 VISIT FOR SCREENING MAMMOGRAM: ICD-10-CM

## 2018-10-18 PROCEDURE — 77080 DXA BONE DENSITY AXIAL: CPT

## 2018-10-18 PROCEDURE — 77067 SCR MAMMO BI INCL CAD: CPT

## 2018-11-21 ENCOUNTER — OFFICE VISIT (OUTPATIENT)
Dept: NEUROLOGY | Facility: CLINIC | Age: 83
End: 2018-11-21

## 2018-11-21 VITALS
HEIGHT: 65 IN | OXYGEN SATURATION: 96 % | DIASTOLIC BLOOD PRESSURE: 88 MMHG | WEIGHT: 177 LBS | BODY MASS INDEX: 29.49 KG/M2 | HEART RATE: 82 BPM | SYSTOLIC BLOOD PRESSURE: 150 MMHG

## 2018-11-21 DIAGNOSIS — I63.10 CEREBROVASCULAR ACCIDENT (CVA) DUE TO EMBOLISM OF PRECEREBRAL ARTERY (HCC): Primary | ICD-10-CM

## 2018-11-21 DIAGNOSIS — I10 ESSENTIAL HYPERTENSION: ICD-10-CM

## 2018-11-21 DIAGNOSIS — E78.5 HYPERLIPIDEMIA LDL GOAL <70: ICD-10-CM

## 2018-11-21 PROBLEM — I63.9 CEREBROVASCULAR ACCIDENT (CVA) DUE TO EMBOLISM (HCC): Status: ACTIVE | Noted: 2018-11-21

## 2018-11-21 PROCEDURE — 99214 OFFICE O/P EST MOD 30 MIN: CPT | Performed by: NURSE PRACTITIONER

## 2018-11-21 RX ORDER — CLOPIDOGREL BISULFATE 75 MG/1
75 TABLET ORAL DAILY
COMMUNITY
End: 2019-08-21 | Stop reason: ALTCHOICE

## 2018-11-21 RX ORDER — ATORVASTATIN CALCIUM 40 MG/1
20 TABLET, FILM COATED ORAL NIGHTLY
COMMUNITY
End: 2021-02-26 | Stop reason: HOSPADM

## 2018-11-21 RX ORDER — AMLODIPINE BESYLATE 10 MG/1
10 TABLET ORAL DAILY
COMMUNITY
End: 2020-02-24

## 2018-11-21 NOTE — PROGRESS NOTES
DOS: 2018  NAME: Katja Klein   : 1935  PCP: Opal Marina MD    Chief Complaint   Patient presents with   • Stroke      SUBJECTIVE  Neurological Problem:  83 y.o. RHW female with HTN, osteoarthritis and CTS who presents today for follow-up of stroke. She is unaccompanied.    Interval History:   Ms. Kleinpresented on 18 with transient right arm numbness, tingling and weakness.  She was found to have small punctate infarcts in the left hemisphere. He vessel imaging shows areas of atherosclerosis but no significant stenosis. MRI C-spine with no cord impingement. Her TTE results are noted above, unrevealing. She was not on any anti-thrombotic her to admission.  She was discharged on aspirin, Plavix and Lipitor 40 mg along with prolonged cardiac monitoring.  She was also discharged on B12 replacement.    She presents today and continues on ASA 81 mg and Lipitor 40 mg.  She also continues to get monthly vitamin B12 injections.  She is tolerating medications well.  She denies any changes in health since her discharge.  She does complain of some left-sided neck pain that is intermittent, she denies any radiation into the arms.  She denies any numbness, tingling, weakness.  She denies any vision changes, vertigo or any new stroke/TIA symptoms.  Her prolonged cardiac monitoring showed no evidence of atrial fibrillation.  She states her blood pressure is well-controlled. She denies smoking. Rare alcohol use.     Review of Systems:Review of Systems   Constitutional: Negative.    HENT: Positive for hearing loss and sinus pressure. Negative for congestion, dental problem, drooling, ear discharge, ear pain, facial swelling, mouth sores, nosebleeds, postnasal drip, rhinorrhea, sinus pain, sneezing, sore throat, tinnitus, trouble swallowing and voice change.    Eyes: Negative.    Respiratory: Negative.    Cardiovascular: Positive for leg swelling. Negative for chest pain and palpitations.    Gastrointestinal: Negative.    Endocrine: Negative.    Genitourinary: Negative.    Musculoskeletal: Negative.    Skin: Negative.    Allergic/Immunologic: Negative.    Neurological: Positive for numbness. Negative for dizziness, tremors, seizures, syncope, facial asymmetry, speech difficulty, weakness, light-headedness and headaches.   Hematological: Negative.    Psychiatric/Behavioral: Negative.        Current Medications:   Current Outpatient Medications:   •  amLODIPine (NORVASC) 10 MG tablet, Take 10 mg by mouth Daily., Disp: , Rfl:   •  aspirin 81 MG tablet, Take 81 mg by mouth Daily., Disp: , Rfl:   •  atorvastatin (LIPITOR) 40 MG tablet, Take 40 mg by mouth Daily., Disp: , Rfl:   •  cholecalciferol (VITAMIN D3) 1000 units tablet, Take 1,000 Units by mouth Daily., Disp: , Rfl:   •  clopidogrel (PLAVIX) 75 MG tablet, Take 75 mg by mouth Daily., Disp: , Rfl:   •  Cyanocobalamin 1000 MCG/ML kit, Inject  as directed., Disp: , Rfl:   •  fluticasone (FLONASE) 50 MCG/ACT nasal spray, 2 sprays into the nostril(s) as directed by provider Daily As Needed for Rhinitis., Disp: , Rfl:   •  lisinopril (PRINIVIL,ZESTRIL) 40 MG tablet, Take 40 mg by mouth 2 (Two) Times a Day., Disp: , Rfl:   •  psyllium (METAMUCIL) 0.52 g capsule, Take 0.52 g by mouth Daily., Disp: , Rfl:   •  raNITIdine (ZANTAC) 300 MG tablet, Take 300 mg by mouth Daily., Disp: , Rfl:     The following portions of the patient's history were reviewed and updated as appropriate: allergies, current medications, past family history, past medical history, past social history, past surgical history and problem list.    OBJECTIVE  Diagnostics:  CT cervical spine 18: FINDINGS: The CT scan was performed as an emergency procedure through  the cervical spine and demonstrates followin. The foramen magnum appears normal. There is some degenerative  spurring involving the odontoid and anterior arch of C1. This does not  affect the spinal canal.  2. At C2-3  there is a minimal central disc bulge and slight right  lateral facet spurring. There is no central or foraminal encroachment.  3. At C3-4 there is a slight posterior disc osteophyte complex. There is  moderate spurring of the right uncovertebral joint and right lateral  facet resulting in moderate right foraminal encroachment.  4. At C4-5 there is a mild posterior disc osteophyte complex. There is  spurring of the left and right lateral facets and right uncovertebral  joint and resulting in moderate right and slight left foraminal  encroachment.  5. At C5-6 there is a slight posterior disc osteophyte complex. There is  some spurring of the right lateral facet and right uncovertebral joint  resulting in borderline right foraminal encroachment.  6. At C6-7 there is mild posterior disc osteophyte complex. There is  spurring of the uncovertebral joints with borderline left foraminal  encroachment.  7. At C7-T1 and T1-2 and T2-3 levels are unremarkable.  MRI brain 8/22/18: IMPRESSION:  Abnormal signal intensity in white matter of both cerebral  hemispheres consistent with sequela of extensive small vessel chronic  ischemic change. There are 3 very tiny punctate foci of acute ischemic  infarction in the subcortical white matter of the left frontal lobe and  also in the cortex of the left parietal lobe.  MRI cervical spine 8/22/18: IMPRESSION:  Essentially unremarkable MRI of the cervical spine,  particularly for a patient of this age. There is no significant disc  bulging or focal disc herniation or encroachment on the spinal canal at  any level.  CT, CTA h/n 8/22/18: FINDINGS:  Axial images were obtained through the brain without  intravenous contrast. There is moderate diffuse atrophy. There is  decreased attenuation of the periventricular white matter bilaterally  consistent with small vessel white matter ischemic disease.  There is no evidence of acute infarction, hemorrhage, midline shift or  mass effect.  Following  the intravenous contrast injection, CT angiography was  performed through the head and neck. Sagittal, coronal and 3D  reconstruction images were reviewed.  NASCET criteria was used.  There is mild atherosclerotic calcification at the right carotid bulb  with approximately 20% diameter stenosis. No significant stenosis is  seen on the left. There is tortuosity of the bilateral internal carotid  arteries most severe on the right. Atretic A1 segment of the left  anterior cerebral artery is seen. Bilateral middle cerebral and the  right anterior cerebral artery are patent. The left anterior cerebral  artery also appears patent with a patent anterior communicating artery.  The bilateral vertebral arteries appear patent. There is a small P1  segment of the left posterior cerebral artery with a mildly prominent  posterior communicating artery on the left. The bilateral posterior  cerebral arteries are otherwise patent.  IMPRESSION:  1. No acute process identified on CT of the brain with and without  contrast.  2. No acute process demonstrated on CT angiography of the head and neck     EKG 8/21/18: SR  TTE 8/22/18:Echocardiogram Findings    Left Ventricle Left ventricular systolic function is normal. Estimated EF = 65%. Normal left ventricular cavity size and wall thickness noted. All left ventricular wall segments contract normally. Septal wall motion is normal. Left ventricular diastolic dysfunction is noted (grade I) consistent with impaired relaxation. Normal left atrial pressure.      Right Ventricle Normal right ventricular cavity size, wall thickness, systolic function and septal motion noted.      Left Atrium Normal left atrial size and volume noted. There is no spontaneous echo contrast present. The interatrial septum does not appear to be redundant. No interatrial septal aneurysm present. No lipomatous hypertrophy of the interatrial septum present. Saline test results are positive for right to left atrial level  shunt.      Right Atrium Normal right atrial size noted.      Aortic Valve The aortic valve is structurally normal. No aortic valve regurgitation is present. No aortic valve stenosis is present.      Mitral Valve The mitral valve is normal in structure. No mitral valve regurgitation is present. No significant mitral valve stenosis is present.      Tricuspid Valve The tricuspid valve is normal. No tricuspid valve stenosis is present. No tricuspid valve regurgitation is present.      Pulmonic Valve The pulmonic valve is structurally normal. There is no significant pulmonic valve stenosis present. There is no pulmonic valve regurgitation present.      Greater Vessels No dilation of the aortic root is present.      Pericardium The pericardium is normal. There is no evidence of pericardial effusion.        Holter Monitor 8/24/18: The patient was monitored for 5 days 24 hours. The predominant rhythm noted during the testing period was sinus rhythm. Premature atrial contractions occured rarely. There were rare bursts of PACs up to 6 beats. There was no evidence of atrial arrhythmias. Premature ventricular contractions occured rarely. Sinoatrial node conduction was normal. No atrioventricular block noted.    Laboratory Results:         Lab Results   Component Value Date    WBC 4.91 08/23/2018    HGB 12.1 08/23/2018    HCT 38.5 08/23/2018    MCV 93.7 08/23/2018     08/23/2018     Lab Results   Component Value Date    GLUCOSE 98 08/24/2018    BUN 10 08/24/2018    CREATININE 0.67 08/24/2018    EGFRIFNONA 84 08/24/2018    BCR 14.9 08/24/2018    K 4.2 08/24/2018    CO2 27.3 08/24/2018    CALCIUM 9.1 08/24/2018    ALBUMIN 4.50 08/21/2018    AST 19 08/21/2018    ALT 12 08/21/2018     Lab Results   Component Value Date    HGBA1C 5.30 08/22/2018     Lab Results   Component Value Date    CHOL 155 08/22/2018     Lab Results   Component Value Date    HDL 68 (H) 08/22/2018     Lab Results   Component Value Date    LDL 73  08/22/2018     Lab Results   Component Value Date    TRIG 68 08/22/2018     No results found for: RPR  Lab Results   Component Value Date    TSH 2.810 08/22/2018     Lab Results   Component Value Date    CGJZHSZJ52 160 (L) 08/22/2018       Physical Examination: NIHSS: 0  General Appearance:   Well developed, well nourished, well groomed, alert, and cooperative.  HEENT: Normocephalic.    Neck and Spine: Normal range of motion.  Normal alignment. No mass or tenderness. No bruits.  Cardiac: Regular rate and rhythm. No murmurs.  Peripheral Vasculature: Radial pulses are equal and symmetric. No signs of distal     embolization.  Extremities:    No edema or deformities. Normal joint ROM.  Skin:    No rashes or birth marks.    Neurological examination:  Higher Integrative  Function: Oriented to time, place and person. Able to name current president, some difficulty with past. Normal registration, recall (2/3 with 1 clue) attention span and concentration. Normal language including comprehension, spontaneous speech, repetition, reading, writing, naming and vocabulary. No neglect with normal visual-spatial function and construction. Normal fund of knowledge and higher integrative function.  CN II: Pupils are equal, round, and reactive to light. Normal visual acuity and visual fields.    CN III IV VI: Extraocular movements are full without nystagmus.   CN V: Normal facial sensation and strength of muscles of mastication.  CN VII: Facial movements are symmetric. No weakness.  CN VIII:   Auditory acuity is normal.  CN IX & X:   Symmetric palatal movement.  CN XI: Sternocleidomastoid and trapezius are normal.  No weakness.  CN XII:   The tongue is midline.  No atrophy or fasciculations.  Motor: Normal muscle strength, bulk and tone in upper and lower extremities.  No fasciculations, rigidity, spasticity, or abnormal movements.  Sensation: Normal to light touch  and proprioception in arms and legs. Normal graphesthesia and no  extinction on DSS.  Station and Gait: Easily able to arise from chair, Normal gait and station. Did well with toe and heel ambulation, some difficulty with tandem.   Coordination: Finger to nose test shows no dysmetria. Heel to shin normal.    Impression:  Ms. Klein is doing well following her small left hemispheric strokes she suffered in August of this year.  She is essentially remained at her neurologic baseline.  The etiology of her event is unclear however, is likely due to atherosclerotic disease as her cardiac evaluation has been unrevealing, including TTE with normal LA size and no evidence of A. fib on prolonged cardiac monitoring.  Recommend that she continue ASA and Lipitor 40 mg. We discussed at length patient's personal risk factors for stroke and the importance of risk factor control for stroke prevention, including BP and cholesterol control. Patient will monitor BP regularly and f/u with PCP for continued cholesterol surveillance. We discussed importance of calling 911 for any signs/symptoms of stroke.  Follow-up in 9 months, sooner symptoms warrant.    Plan:     Continue on ASA and Lipitor  Monitor BP regularly and record  Encouraged regular physical activity  Secondary stroke prevention: Ideal targets for stroke prevention would be Blood pressure < 130/80; B12 > 500 TSH in normal range and LDL < 70; HbA1c < 6.5 and smoking cessation if applicable.    Call 911 for stroke symptoms  Follow-up 9 months    I spent 30 minutes face to face with patient, with  > 50% spent counseling patient regarding diagnosis, review of diagnostics, personal risk factors for stroke and importance of risk factor control for stroke prevention.     Katja was seen today for stroke.    Diagnoses and all orders for this visit:    Cerebrovascular accident (CVA) due to embolism of precerebral artery (CMS/HCC)    Essential hypertension    Hyperlipidemia LDL goal <70        Coding

## 2018-12-16 ENCOUNTER — APPOINTMENT (OUTPATIENT)
Dept: CT IMAGING | Facility: HOSPITAL | Age: 83
End: 2018-12-16

## 2018-12-16 ENCOUNTER — HOSPITAL ENCOUNTER (EMERGENCY)
Facility: HOSPITAL | Age: 83
Discharge: HOME | End: 2018-12-16
Attending: EMERGENCY MEDICINE | Admitting: EMERGENCY MEDICINE

## 2018-12-16 VITALS
WEIGHT: 180.6 LBS | TEMPERATURE: 97.3 F | HEART RATE: 86 BPM | DIASTOLIC BLOOD PRESSURE: 76 MMHG | SYSTOLIC BLOOD PRESSURE: 124 MMHG | BODY MASS INDEX: 32 KG/M2 | OXYGEN SATURATION: 97 % | HEIGHT: 63 IN | RESPIRATION RATE: 18 BRPM

## 2018-12-16 DIAGNOSIS — M79.601 PARESTHESIA AND PAIN OF BOTH UPPER EXTREMITIES: Primary | ICD-10-CM

## 2018-12-16 DIAGNOSIS — R20.2 PARESTHESIA AND PAIN OF BOTH UPPER EXTREMITIES: Primary | ICD-10-CM

## 2018-12-16 DIAGNOSIS — M79.602 PARESTHESIA AND PAIN OF BOTH UPPER EXTREMITIES: Primary | ICD-10-CM

## 2018-12-16 LAB
ALBUMIN SERPL-MCNC: 4.4 G/DL (ref 3.5–5.2)
ALBUMIN/GLOB SERPL: 1.8 G/DL
ALP SERPL-CCNC: 186 U/L (ref 39–117)
ALT SERPL W P-5'-P-CCNC: 20 U/L (ref 1–33)
ANION GAP SERPL CALCULATED.3IONS-SCNC: 12.9 MMOL/L
AST SERPL-CCNC: 26 U/L (ref 1–32)
BASOPHILS # BLD AUTO: 0.01 10*3/MM3 (ref 0–0.2)
BASOPHILS NFR BLD AUTO: 0.1 % (ref 0–1.5)
BILIRUB SERPL-MCNC: 0.6 MG/DL (ref 0.1–1.2)
BILIRUB UR QL STRIP: NEGATIVE
BUN BLD-MCNC: 11 MG/DL (ref 8–23)
BUN/CREAT SERPL: 16.9 (ref 7–25)
CALCIUM SPEC-SCNC: 9.7 MG/DL (ref 8.6–10.5)
CHLORIDE SERPL-SCNC: 101 MMOL/L (ref 98–107)
CLARITY UR: CLEAR
CO2 SERPL-SCNC: 23.1 MMOL/L (ref 22–29)
COLOR UR: YELLOW
CREAT BLD-MCNC: 0.65 MG/DL (ref 0.57–1)
DEPRECATED RDW RBC AUTO: 43.6 FL (ref 37–54)
EOSINOPHIL # BLD AUTO: 0.07 10*3/MM3 (ref 0–0.7)
EOSINOPHIL NFR BLD AUTO: 0.7 % (ref 0.3–6.2)
ERYTHROCYTE [DISTWIDTH] IN BLOOD BY AUTOMATED COUNT: 13 % (ref 11.7–13)
GFR SERPL CREATININE-BSD FRML MDRD: 87 ML/MIN/1.73
GLOBULIN UR ELPH-MCNC: 2.5 GM/DL
GLUCOSE BLD-MCNC: 108 MG/DL (ref 65–99)
GLUCOSE UR STRIP-MCNC: NEGATIVE MG/DL
HCT VFR BLD AUTO: 41.6 % (ref 35.6–45.5)
HGB BLD-MCNC: 13.8 G/DL (ref 11.9–15.5)
HGB UR QL STRIP.AUTO: NEGATIVE
IMM GRANULOCYTES # BLD: 0.02 10*3/MM3 (ref 0–0.03)
IMM GRANULOCYTES NFR BLD: 0.2 % (ref 0–0.5)
KETONES UR QL STRIP: ABNORMAL
LEUKOCYTE ESTERASE UR QL STRIP.AUTO: NEGATIVE
LYMPHOCYTES # BLD AUTO: 1.24 10*3/MM3 (ref 0.9–4.8)
LYMPHOCYTES NFR BLD AUTO: 12.5 % (ref 19.6–45.3)
MCH RBC QN AUTO: 30.7 PG (ref 26.9–32)
MCHC RBC AUTO-ENTMCNC: 33.2 G/DL (ref 32.4–36.3)
MCV RBC AUTO: 92.4 FL (ref 80.5–98.2)
MONOCYTES # BLD AUTO: 0.54 10*3/MM3 (ref 0.2–1.2)
MONOCYTES NFR BLD AUTO: 5.5 % (ref 5–12)
NEUTROPHILS # BLD AUTO: 8.03 10*3/MM3 (ref 1.9–8.1)
NEUTROPHILS NFR BLD AUTO: 81.2 % (ref 42.7–76)
NITRITE UR QL STRIP: NEGATIVE
NRBC BLD MANUAL-RTO: 0 /100 WBC (ref 0–0)
PH UR STRIP.AUTO: <=5 [PH] (ref 5–8)
PLATELET # BLD AUTO: 257 10*3/MM3 (ref 140–500)
PMV BLD AUTO: 10.4 FL (ref 6–12)
POTASSIUM BLD-SCNC: 3.9 MMOL/L (ref 3.5–5.2)
PROT SERPL-MCNC: 6.9 G/DL (ref 6–8.5)
PROT UR QL STRIP: NEGATIVE
RBC # BLD AUTO: 4.5 10*6/MM3 (ref 3.9–5.2)
SODIUM BLD-SCNC: 137 MMOL/L (ref 136–145)
SP GR UR STRIP: 1.02 (ref 1–1.03)
TROPONIN T SERPL-MCNC: <0.01 NG/ML (ref 0–0.03)
UROBILINOGEN UR QL STRIP: ABNORMAL
WBC NRBC COR # BLD: 9.89 10*3/MM3 (ref 4.5–10.7)

## 2018-12-16 PROCEDURE — 84484 ASSAY OF TROPONIN QUANT: CPT | Performed by: NURSE PRACTITIONER

## 2018-12-16 PROCEDURE — 70450 CT HEAD/BRAIN W/O DYE: CPT

## 2018-12-16 PROCEDURE — 93005 ELECTROCARDIOGRAM TRACING: CPT | Performed by: NURSE PRACTITIONER

## 2018-12-16 PROCEDURE — 81003 URINALYSIS AUTO W/O SCOPE: CPT | Performed by: NURSE PRACTITIONER

## 2018-12-16 PROCEDURE — 99284 EMERGENCY DEPT VISIT MOD MDM: CPT

## 2018-12-16 PROCEDURE — 85025 COMPLETE CBC W/AUTO DIFF WBC: CPT | Performed by: NURSE PRACTITIONER

## 2018-12-16 PROCEDURE — 93010 ELECTROCARDIOGRAM REPORT: CPT | Performed by: INTERNAL MEDICINE

## 2018-12-16 PROCEDURE — 80053 COMPREHEN METABOLIC PANEL: CPT | Performed by: NURSE PRACTITIONER

## 2018-12-16 NOTE — ED TRIAGE NOTES
Patient presents to er via ems from home.  Patient reports going to bed around 2215 last night.  Patient reports bilateral upper ext numbness and tongue numbness that started when she awoke at 0515.  Patient reports frontal face pressure.  GCS 15 and NAD.

## 2018-12-16 NOTE — ED PROVIDER NOTES
Pt is a 83 y.o. female who presents to the ED complaining of intermitant BUE and facial paresthesia that started 0515 this morning. She denies dysphasia, unilateral weakness, fever, or diarrhea. Pt reports that she has had similar episodes of BUE paresthesia in the past.    On exam,  Constitutional: NAD  Pulmonary: CTAB  Neurological: Recent and remote memory functions are normal. The patient is attentive with normal concentration. Language is fluent. Speech is clear. The speech is non-dysarthric. Fund of knowledge is normal.   Symmetric smile with no facial droop.  Eyes close shut strongly bilaterally.  Symmetric eyebrow raise bilaterally.  EOMI, PERRL  CN II-XII grossly normal otherwise.  5/5 strength to extremities.  No pronator drift.  Intact FNF.  No meningismus.   No paresthesias to light touch of face, arms, or legs.   Normal gait.     Labs and imaging reviewed.     EKG          EKG time: 0904  Rhythm/Rate: sinus 91  P waves and DE: Ps are normal  QRS, axis: LAFB and low voltage   ST and T waves: normal     Interpreted Contemporaneously by me, independently viewed  Similar compared to prior 8/21/18      0907 - Rechecked pt. She is resting comfortably. Performed neurological exam. See physical exam above. Advised pt that we will proceed w/ further workup including labs and imaging. All questions answered. Pt understands and agrees .    Plan: Collect labs and CT head for further evaluation .    MD ATTESTATION NOTE    The RAJENDRA and I have discussed this patient's history, physical exam, and treatment plan.  I have reviewed the documentation and personally had a face to face interaction with the patient. I affirm the documentation and agree with the treatment and plan.  The attached note describes my personal findings.      Documentation assistance provided by christiano Hernandez for Dr. Christensen. Information recorded by the christiano was done at my direction and has been verified and validated by me.             David  Zabrina  12/16/18 0911       Phani Christensen II, MD  12/16/18 [id number]

## 2018-12-16 NOTE — ED PROVIDER NOTES
EMERGENCY DEPARTMENT ENCOUNTER    CHIEF COMPLAINT  Chief Complaint: numbness  History given by:patient  History limited by:none  Time Seen: 0847  Room Number: 28/28  PMD: Opal Marina MD      HPI:  Pt is a 83 y.o. female who presents with numbness of her BUE, face, and tongue onset about 4 hours ago. Pt states that when she woke up at around 0515 this morning she felt that her BUE and her face were numb. She currently reports that the numbness has subsided in her BUE and face, but that her tongue is still numb. Pt was normal when going to bed. She also c/o a mild cough, congestion, sinus pressure, nausea, and rhinorrhea. Pt denies HA, dizziness, diarrhea, vomiting and CP.      Pt had a CVA 4 months ago, but states that these Sx are not similar.    Duration: about 4 hours  Timing:constant  Location:BUE (resolved), generalized face (resolved), toungue  Radiation:none  Quality:numbness  Intensity/Severity:moderate  Progression:improved  Associated Symptoms:nausea, cough, congestion, sinus pain, rhinorrhea  Aggravating Factors:none  Alleviating Factors:none  Previous Episodes:Pt had a CVA about 4 months ago.   Treatment before arrival:none    PAST MEDICAL HISTORY  Active Ambulatory Problems     Diagnosis Date Noted   • Stroke-like symptoms 08/21/2018   • Cerebrovascular accident (CVA) due to embolism (CMS/HCC) 11/21/2018   • Essential hypertension 11/21/2018   • Hyperlipidemia LDL goal <70 11/21/2018     Resolved Ambulatory Problems     Diagnosis Date Noted   • No Resolved Ambulatory Problems     Past Medical History:   Diagnosis Date   • CTS (carpal tunnel syndrome)    • Hypertension    • Migraine    • Stroke (CMS/HCC)        PAST SURGICAL HISTORY  Past Surgical History:   Procedure Laterality Date   • CARPAL TUNNEL RELEASE     • CHOLECYSTECTOMY     • HYSTERECTOMY     • OOPHORECTOMY         FAMILY HISTORY  Family History   Problem Relation Age of Onset   • Migraines Mother    • Cancer Mother    • Stroke Father     • Cancer Sister    • Migraines Sister        SOCIAL HISTORY  Social History     Socioeconomic History   • Marital status:      Spouse name: Not on file   • Number of children: Not on file   • Years of education: Not on file   • Highest education level: Not on file   Social Needs   • Financial resource strain: Not on file   • Food insecurity - worry: Not on file   • Food insecurity - inability: Not on file   • Transportation needs - medical: Not on file   • Transportation needs - non-medical: Not on file   Occupational History   • Not on file   Tobacco Use   • Smoking status: Former Smoker   • Smokeless tobacco: Never Used   Substance and Sexual Activity   • Alcohol use: No   • Drug use: No   • Sexual activity: Not on file   Other Topics Concern   • Not on file   Social History Narrative   • Not on file         ALLERGIES  Codeine    REVIEW OF SYSTEMS  Review of Systems   HENT: Positive for congestion, rhinorrhea and sinus pressure.    Respiratory: Positive for cough (mild). Negative for shortness of breath.    Cardiovascular: Negative for chest pain.   Gastrointestinal: Positive for nausea. Negative for diarrhea and vomiting.   Neurological: Positive for numbness (BUE (resolved), generalized face (resolved), toungue). Negative for dizziness and headaches.       PHYSICAL EXAM  ED Triage Vitals [12/16/18 0838]   Temp Heart Rate Resp BP SpO2   97.4 °F (36.3 °C) 84 18 156/84 98 %      Temp src Heart Rate Source Patient Position BP Location FiO2 (%)   Tympanic Monitor Lying -- --       Physical Exam    LAB RESULTS  Recent Results (from the past 24 hour(s))   Comprehensive Metabolic Panel    Collection Time: 12/16/18  9:07 AM   Result Value Ref Range    Glucose 108 (H) 65 - 99 mg/dL    BUN 11 8 - 23 mg/dL    Creatinine 0.65 0.57 - 1.00 mg/dL    Sodium 137 136 - 145 mmol/L    Potassium 3.9 3.5 - 5.2 mmol/L    Chloride 101 98 - 107 mmol/L    CO2 23.1 22.0 - 29.0 mmol/L    Calcium 9.7 8.6 - 10.5 mg/dL    Total  Protein 6.9 6.0 - 8.5 g/dL    Albumin 4.40 3.50 - 5.20 g/dL    ALT (SGPT) 20 1 - 33 U/L    AST (SGOT) 26 1 - 32 U/L    Alkaline Phosphatase 186 (H) 39 - 117 U/L    Total Bilirubin 0.6 0.1 - 1.2 mg/dL    eGFR Non African Amer 87 >60 mL/min/1.73    Globulin 2.5 gm/dL    A/G Ratio 1.8 g/dL    BUN/Creatinine Ratio 16.9 7.0 - 25.0    Anion Gap 12.9 mmol/L   Urinalysis With Microscopic If Indicated (No Culture) - Urine, Clean Catch    Collection Time: 12/16/18  9:07 AM   Result Value Ref Range    Color, UA Yellow Yellow, Straw    Appearance, UA Clear Clear    pH, UA <=5.0 5.0 - 8.0    Specific Gravity, UA 1.021 1.005 - 1.030    Glucose, UA Negative Negative    Ketones, UA Trace (A) Negative    Bilirubin, UA Negative Negative    Blood, UA Negative Negative    Protein, UA Negative Negative    Leuk Esterase, UA Negative Negative    Nitrite, UA Negative Negative    Urobilinogen, UA 1.0 E.U./dL 0.2 - 1.0 E.U./dL   Troponin    Collection Time: 12/16/18  9:07 AM   Result Value Ref Range    Troponin T <0.010 0.000 - 0.030 ng/mL   CBC Auto Differential    Collection Time: 12/16/18  9:07 AM   Result Value Ref Range    WBC 9.89 4.50 - 10.70 10*3/mm3    RBC 4.50 3.90 - 5.20 10*6/mm3    Hemoglobin 13.8 11.9 - 15.5 g/dL    Hematocrit 41.6 35.6 - 45.5 %    MCV 92.4 80.5 - 98.2 fL    MCH 30.7 26.9 - 32.0 pg    MCHC 33.2 32.4 - 36.3 g/dL    RDW 13.0 11.7 - 13.0 %    RDW-SD 43.6 37.0 - 54.0 fl    MPV 10.4 6.0 - 12.0 fL    Platelets 257 140 - 500 10*3/mm3    Neutrophil % 81.2 (H) 42.7 - 76.0 %    Lymphocyte % 12.5 (L) 19.6 - 45.3 %    Monocyte % 5.5 5.0 - 12.0 %    Eosinophil % 0.7 0.3 - 6.2 %    Basophil % 0.1 0.0 - 1.5 %    Immature Grans % 0.2 0.0 - 0.5 %    Neutrophils, Absolute 8.03 1.90 - 8.10 10*3/mm3    Lymphocytes, Absolute 1.24 0.90 - 4.80 10*3/mm3    Monocytes, Absolute 0.54 0.20 - 1.20 10*3/mm3    Eosinophils, Absolute 0.07 0.00 - 0.70 10*3/mm3    Basophils, Absolute 0.01 0.00 - 0.20 10*3/mm3    Immature Grans, Absolute 0.02  "0.00 - 0.03 10*3/mm3    nRBC 0.0 0.0 - 0.0 /100 WBC       I ordered the above labs and reviewed the results    RADIOLOGY  CT Head Without Contrast   Final Result   There is moderate diffuse atrophy and chronic small vessel ischemic change as also noted on the MRI scan dated 08/22/2018. There is no evidence of acute intracranial hemorrhage or mass effect and the visualized sinuses are clear.     I ordered the above noted radiological studies and reviewed the images on the PACS system.  Spoke with Dr. Cruz regarding CT scan results    EKG    EKG was interpreted by Dr. Christensen    PROGRESS AND CONSULTS    0906- Reviewed pt's history and workup with Dr. Christensen. After a bedside evaluation; Dr Christensen agrees with the plan of care.    1052- Rechecked pt who is resting comfortably. Informed pt of her unremarkable lab and imaging results. Discussed plan to d/c pt home and advised pt to f/u with her PCP. RTED instructions given. Pt understands and agrees with the plan, all questions answered.    COURSE & MEDICAL DECISION MAKING  Pertinent Labs and Imaging studies that were ordered and reviewed are noted above.  Results were reviewed/discussed with the patient and they were also made aware of online assess.   Pt also made aware that some labs, such as cultures, will not be resulted during ER visit and follow up with PMD is necessary.     MEDICATIONS GIVEN IN ER  Medications - No data to display    /76   Pulse 86   Temp 97.3 °F (36.3 °C)   Resp 18   Ht 160 cm (63\")   Wt 81.9 kg (180 lb 9.6 oz)   SpO2 97%   BMI 31.99 kg/m²     Discussed all results and noted any abnormalities with patient.     Reviewed implications of results, diagnosis, meds, responsibility to follow up, warning signs and symptoms of possible worsening, potential complications and reasons to return to ER with patient    Discussed plan for discharge, as there is no emergent indication for admission.  Pt is agreeable and understands need for follow up " and repeat testing.  Pt is aware that discharge does not mean that nothing is wrong but it indicates no emergency is present and they must continue care with her PCP.  Pt is discharged with instructions to follow up with primary care doctor to have their blood pressure rechecked.       DIAGNOSIS  Final diagnoses:   Paresthesia and pain of both upper extremities       FOLLOW UP   Opal Marina MD  [address]  [address]  [facility] [address]  [phone]    Schedule an appointment as soon as possible for a visit         RX     Medication List      No changes were made to your prescriptions during this visit.         I personally reviewed the past medical history, past surgical history, social history, family history, current medications and allergies as they appear in this chart.  The scribe's note accurately reflects the work and decisions made by me.       Documentation assistance provided by christiano Iniguez for NAHID Leonardo.  Information recorded by the scribe was done at my direction and has been verified and validated by me.     Robert Iniguez  12/16/18 [id number]       Lorena Sánchez APRN  12/16/18 1212

## 2019-02-28 ENCOUNTER — TRANSCRIBE ORDERS (OUTPATIENT)
Dept: ADMINISTRATIVE | Facility: HOSPITAL | Age: 84
End: 2019-02-28

## 2019-02-28 ENCOUNTER — HOSPITAL ENCOUNTER (OUTPATIENT)
Dept: ULTRASOUND IMAGING | Facility: HOSPITAL | Age: 84
Discharge: HOME | End: 2019-02-28
Admitting: FAMILY MEDICINE

## 2019-02-28 DIAGNOSIS — R11.0 NAUSEA: Primary | ICD-10-CM

## 2019-02-28 DIAGNOSIS — R11.0 NAUSEA: ICD-10-CM

## 2019-02-28 PROCEDURE — 76700 US EXAM ABDOM COMPLETE: CPT

## 2019-03-06 ENCOUNTER — OFFICE (OUTPATIENT)
Dept: URBAN - METROPOLITAN AREA CLINIC 75 | Facility: CLINIC | Age: 84
End: 2019-03-06

## 2019-03-06 ENCOUNTER — HOSPITAL ENCOUNTER (OUTPATIENT)
Dept: CT IMAGING | Facility: HOSPITAL | Age: 84
Discharge: HOME | End: 2019-03-06
Admitting: PHYSICIAN ASSISTANT

## 2019-03-06 ENCOUNTER — LAB (OUTPATIENT)
Dept: LAB | Facility: HOSPITAL | Age: 84
End: 2019-03-06

## 2019-03-06 ENCOUNTER — TRANSCRIBE ORDERS (OUTPATIENT)
Dept: ADMINISTRATIVE | Facility: HOSPITAL | Age: 84
End: 2019-03-06

## 2019-03-06 VITALS
HEART RATE: 72 BPM | DIASTOLIC BLOOD PRESSURE: 80 MMHG | SYSTOLIC BLOOD PRESSURE: 120 MMHG | WEIGHT: 169 LBS | HEIGHT: 65 IN

## 2019-03-06 DIAGNOSIS — R10.13 ABDOMINAL PAIN, EPIGASTRIC: Primary | ICD-10-CM

## 2019-03-06 DIAGNOSIS — K59.00 CONSTIPATION, UNSPECIFIED CONSTIPATION TYPE: ICD-10-CM

## 2019-03-06 DIAGNOSIS — R10.13 EPIGASTRIC PAIN: ICD-10-CM

## 2019-03-06 DIAGNOSIS — R11.0 NAUSEA: ICD-10-CM

## 2019-03-06 DIAGNOSIS — R63.0 LOSS OF APPETITE: ICD-10-CM

## 2019-03-06 DIAGNOSIS — R19.4 CHANGE IN BOWEL HABIT: ICD-10-CM

## 2019-03-06 DIAGNOSIS — R19.4 FREQUENT BOWEL MOVEMENTS: ICD-10-CM

## 2019-03-06 DIAGNOSIS — R10.13 ABDOMINAL PAIN, EPIGASTRIC: ICD-10-CM

## 2019-03-06 DIAGNOSIS — K59.00 CONSTIPATION, UNSPECIFIED: ICD-10-CM

## 2019-03-06 DIAGNOSIS — R19.4 CHANGE IN BOWEL HABITS: ICD-10-CM

## 2019-03-06 DIAGNOSIS — R63.0 ANOREXIA: ICD-10-CM

## 2019-03-06 LAB
ALBUMIN SERPL-MCNC: 4.8 G/DL (ref 3.5–5.2)
ALBUMIN/GLOB SERPL: 2 G/DL
ALP SERPL-CCNC: 134 U/L (ref 39–117)
ALT SERPL W P-5'-P-CCNC: 18 U/L (ref 1–33)
AMYLASE SERPL-CCNC: 33 U/L (ref 28–100)
ANION GAP SERPL CALCULATED.3IONS-SCNC: 14.4 MMOL/L
AST SERPL-CCNC: 20 U/L (ref 1–32)
BASOPHILS # BLD AUTO: 0.03 10*3/MM3 (ref 0–0.2)
BASOPHILS NFR BLD AUTO: 0.4 % (ref 0–1.5)
BILIRUB SERPL-MCNC: 1.3 MG/DL (ref 0.1–1.2)
BUN BLD-MCNC: 10 MG/DL (ref 8–23)
BUN/CREAT SERPL: 11.6 (ref 7–25)
CALCIUM SPEC-SCNC: 10.9 MG/DL (ref 8.6–10.5)
CHLORIDE SERPL-SCNC: 98 MMOL/L (ref 98–107)
CO2 SERPL-SCNC: 24.6 MMOL/L (ref 22–29)
CREAT BLD-MCNC: 0.86 MG/DL (ref 0.57–1)
CRP SERPL-MCNC: 0.04 MG/DL (ref 0–0.5)
DEPRECATED RDW RBC AUTO: 42.6 FL (ref 37–54)
EOSINOPHIL # BLD AUTO: 0.02 10*3/MM3 (ref 0–0.4)
EOSINOPHIL NFR BLD AUTO: 0.2 % (ref 0.3–6.2)
ERYTHROCYTE [DISTWIDTH] IN BLOOD BY AUTOMATED COUNT: 12.5 % (ref 12.3–15.4)
GFR SERPL CREATININE-BSD FRML MDRD: 63 ML/MIN/1.73
GLOBULIN UR ELPH-MCNC: 2.4 GM/DL
GLUCOSE BLD-MCNC: 108 MG/DL (ref 65–99)
HCT VFR BLD AUTO: 44.1 % (ref 34–46.6)
HGB BLD-MCNC: 14.4 G/DL (ref 12–15.9)
IMM GRANULOCYTES # BLD AUTO: 0.03 10*3/MM3 (ref 0–0.05)
IMM GRANULOCYTES NFR BLD AUTO: 0.4 % (ref 0–0.5)
LIPASE SERPL-CCNC: 15 U/L (ref 13–60)
LYMPHOCYTES # BLD AUTO: 2.31 10*3/MM3 (ref 0.7–3.1)
LYMPHOCYTES NFR BLD AUTO: 27.7 % (ref 19.6–45.3)
MCH RBC QN AUTO: 30.5 PG (ref 26.6–33)
MCHC RBC AUTO-ENTMCNC: 32.7 G/DL (ref 31.5–35.7)
MCV RBC AUTO: 93.4 FL (ref 79–97)
MONOCYTES # BLD AUTO: 0.74 10*3/MM3 (ref 0.1–0.9)
MONOCYTES NFR BLD AUTO: 8.9 % (ref 5–12)
NEUTROPHILS # BLD AUTO: 5.22 10*3/MM3 (ref 1.4–7)
NEUTROPHILS NFR BLD AUTO: 62.4 % (ref 42.7–76)
NRBC BLD AUTO-RTO: 0 /100 WBC (ref 0–0)
PLATELET # BLD AUTO: 296 10*3/MM3 (ref 140–450)
PMV BLD AUTO: 10.5 FL (ref 6–12)
POTASSIUM BLD-SCNC: 4.8 MMOL/L (ref 3.5–5.2)
PROT SERPL-MCNC: 7.2 G/DL (ref 6–8.5)
RBC # BLD AUTO: 4.72 10*6/MM3 (ref 3.77–5.28)
SODIUM BLD-SCNC: 137 MMOL/L (ref 136–145)
WBC NRBC COR # BLD: 8.35 10*3/MM3 (ref 3.4–10.8)

## 2019-03-06 PROCEDURE — 83690 ASSAY OF LIPASE: CPT | Performed by: FAMILY MEDICINE

## 2019-03-06 PROCEDURE — 25010000002 IOPAMIDOL 61 % SOLUTION: Performed by: PHYSICIAN ASSISTANT

## 2019-03-06 PROCEDURE — 74177 CT ABD & PELVIS W/CONTRAST: CPT

## 2019-03-06 PROCEDURE — 36415 COLL VENOUS BLD VENIPUNCTURE: CPT

## 2019-03-06 PROCEDURE — 82565 ASSAY OF CREATININE: CPT

## 2019-03-06 PROCEDURE — 82150 ASSAY OF AMYLASE: CPT | Performed by: FAMILY MEDICINE

## 2019-03-06 PROCEDURE — 85025 COMPLETE CBC W/AUTO DIFF WBC: CPT | Performed by: FAMILY MEDICINE

## 2019-03-06 PROCEDURE — 80053 COMPREHEN METABOLIC PANEL: CPT | Performed by: FAMILY MEDICINE

## 2019-03-06 PROCEDURE — 0 DIATRIZOATE MEGLUMINE & SODIUM PER 1 ML: Performed by: PHYSICIAN ASSISTANT

## 2019-03-06 PROCEDURE — 86140 C-REACTIVE PROTEIN: CPT | Performed by: FAMILY MEDICINE

## 2019-03-06 PROCEDURE — 99205 OFFICE O/P NEW HI 60 MIN: CPT

## 2019-03-06 RX ADMIN — DIATRIZOATE MEGLUMINE AND DIATRIZOATE SODIUM 30 ML: 660; 100 LIQUID ORAL; RECTAL at 13:30

## 2019-03-06 RX ADMIN — IOPAMIDOL 85 ML: 612 INJECTION, SOLUTION INTRAVENOUS at 14:49

## 2019-03-07 ENCOUNTER — OFFICE (OUTPATIENT)
Dept: URBAN - METROPOLITAN AREA PATHOLOGY 4 | Facility: PATHOLOGY | Age: 84
End: 2019-03-07
Payer: MEDICARE

## 2019-03-07 ENCOUNTER — AMBULATORY SURGICAL CENTER (OUTPATIENT)
Dept: URBAN - METROPOLITAN AREA SURGERY 17 | Facility: SURGERY | Age: 84
End: 2019-03-07

## 2019-03-07 VITALS
SYSTOLIC BLOOD PRESSURE: 134 MMHG | HEART RATE: 80 BPM | HEART RATE: 92 BPM | RESPIRATION RATE: 14 BRPM | DIASTOLIC BLOOD PRESSURE: 68 MMHG | TEMPERATURE: 98.7 F | TEMPERATURE: 98.8 F | RESPIRATION RATE: 15 BRPM | OXYGEN SATURATION: 98 % | DIASTOLIC BLOOD PRESSURE: 57 MMHG | DIASTOLIC BLOOD PRESSURE: 79 MMHG | OXYGEN SATURATION: 97 % | DIASTOLIC BLOOD PRESSURE: 84 MMHG | SYSTOLIC BLOOD PRESSURE: 126 MMHG | SYSTOLIC BLOOD PRESSURE: 143 MMHG | OXYGEN SATURATION: 93 % | HEART RATE: 89 BPM | HEART RATE: 104 BPM | HEART RATE: 74 BPM | WEIGHT: 165 LBS | RESPIRATION RATE: 18 BRPM | RESPIRATION RATE: 26 BRPM | DIASTOLIC BLOOD PRESSURE: 78 MMHG | HEART RATE: 98 BPM | HEIGHT: 65 IN | SYSTOLIC BLOOD PRESSURE: 132 MMHG | SYSTOLIC BLOOD PRESSURE: 112 MMHG

## 2019-03-07 DIAGNOSIS — R10.11 RIGHT UPPER QUADRANT PAIN: ICD-10-CM

## 2019-03-07 DIAGNOSIS — K29.70 GASTRITIS, UNSPECIFIED, WITHOUT BLEEDING: ICD-10-CM

## 2019-03-07 DIAGNOSIS — R63.0 ANOREXIA: ICD-10-CM

## 2019-03-07 DIAGNOSIS — R10.13 EPIGASTRIC PAIN: ICD-10-CM

## 2019-03-07 DIAGNOSIS — R11.0 NAUSEA: ICD-10-CM

## 2019-03-07 DIAGNOSIS — K44.9 DIAPHRAGMATIC HERNIA WITHOUT OBSTRUCTION OR GANGRENE: ICD-10-CM

## 2019-03-07 LAB
CREAT BLDA-MCNC: 0.8 MG/DL (ref 0.6–1.3)
GI HISTOLOGY: A. UNSPECIFIED: (no result)
GI HISTOLOGY: B. UNSPECIFIED: (no result)
GI HISTOLOGY: PDF REPORT: (no result)

## 2019-03-07 PROCEDURE — 43239 EGD BIOPSY SINGLE/MULTIPLE: CPT | Performed by: INTERNAL MEDICINE

## 2019-03-07 PROCEDURE — 88342 IMHCHEM/IMCYTCHM 1ST ANTB: CPT | Performed by: INTERNAL MEDICINE

## 2019-03-07 PROCEDURE — 88305 TISSUE EXAM BY PATHOLOGIST: CPT | Performed by: INTERNAL MEDICINE

## 2019-03-07 NOTE — SERVICEHPINOTES
Mrs. Iverson presents at the request of her PCP with a 10 day history of acute diffuse abdominal pain and anorexia. She also c/o diarrhea. She was given a shot of promethazine and Dexilant samples at her PCP office. The promethazine shot helped for several days. The Dexilant did not seem to help anymore than the zantac. She also tried ondansetron which did not help. BRCurrently her pain is in the upper abdomen and radiating to her chest or back. She is nauseated all the time. No change with food although she does not have an appetite. Although she initially had diarrhea, she has not had a significant BM in 7 days. She did a suppository last night but only had "rabbit pellets" after that. Dominic underwent US which showed no acute pathology. Domiinc believes she had a history of dicverticulitis and hiatal hernia. Dominic was hospitalized in 2015 with similar symptoms and she was diagnosed with some sort of GI condition.

## 2019-03-13 ENCOUNTER — TRANSCRIBE ORDERS (OUTPATIENT)
Dept: ADMINISTRATIVE | Facility: HOSPITAL | Age: 84
End: 2019-03-13

## 2019-03-13 DIAGNOSIS — K29.00 OTHER ACUTE GASTRITIS WITHOUT HEMORRHAGE: Primary | ICD-10-CM

## 2019-03-14 ENCOUNTER — OFFICE (OUTPATIENT)
Dept: URBAN - METROPOLITAN AREA CLINIC 75 | Facility: CLINIC | Age: 84
End: 2019-03-14

## 2019-03-14 VITALS
DIASTOLIC BLOOD PRESSURE: 80 MMHG | SYSTOLIC BLOOD PRESSURE: 120 MMHG | HEIGHT: 65 IN | HEART RATE: 117 BPM | WEIGHT: 170 LBS

## 2019-03-14 DIAGNOSIS — R10.13 EPIGASTRIC PAIN: ICD-10-CM

## 2019-03-14 DIAGNOSIS — K44.9 DIAPHRAGMATIC HERNIA WITHOUT OBSTRUCTION OR GANGRENE: ICD-10-CM

## 2019-03-14 DIAGNOSIS — R10.11 RIGHT UPPER QUADRANT PAIN: ICD-10-CM

## 2019-03-14 DIAGNOSIS — R11.0 NAUSEA: ICD-10-CM

## 2019-03-14 DIAGNOSIS — R63.0 ANOREXIA: ICD-10-CM

## 2019-03-14 DIAGNOSIS — K29.70 GASTRITIS, UNSPECIFIED, WITHOUT BLEEDING: ICD-10-CM

## 2019-03-14 DIAGNOSIS — R74.8 ABNORMAL LEVELS OF OTHER SERUM ENZYMES: ICD-10-CM

## 2019-03-14 DIAGNOSIS — R19.4 CHANGE IN BOWEL HABIT: ICD-10-CM

## 2019-03-14 PROCEDURE — 99214 OFFICE O/P EST MOD 30 MIN: CPT

## 2019-03-14 RX ORDER — RANITIDINE HYDROCHLORIDE 300 MG/1
600 CAPSULE ORAL
Qty: 180 | Refills: 3 | Status: COMPLETED
End: 2019-12-26

## 2019-03-14 NOTE — SERVICEHPINOTES
03/06/19... Mrs. Iverson presents at the request of her PCP with a 10 day history of acute diffuse abdominal pain and anorexia. She also c/o diarrhea. She was given a shot of promethazine and Dexilant samples at her PCP office. The promethazine shot helped for several days. The Dexilant did not seem to help anymore than the zantac. She also tried ondansetron which did not help. BRCurrently her pain is in the upper abdomen and radiating to her chest or back. She is nauseated all the time. No change with food although she does not have an appetite. Although she initially had diarrhea, she has not had a significant BM in 7 days. She did a suppository last night but only had "rabbit pellets" after that. Dominic underwent US which showed no acute pathology. Dominic believes she had a history of dicverticulitis and hiatal hernia. Dominic was hospitalized in 2015 with similar symptoms and she was diagnosed with some sort of GI condition.03/14/19...Mrs. Iverson follows up with no change in symptoms. She had worsening upper abdominal pain radiating to the chest on 3/12. She was having small amounts of stool that were soft to formed. She took Metamucil and felt like that sat in her stomach and made her epigastric pain worse. Dominic felt some better yesterday but today she is having more low abdominal cramping with diarrhea. She still feels "tight" in the upper abdomen. She is very nauseous today but not on Tuesday. Dominic is taking Ranitidine 300mg BID, sucralfate 3-4 times a day, Zofran PRN for nausea. Dominic underwent EGD with Dr. Richards last week which showed hiatal hernia and antral gastritis. Pathology was benign duodenum, chronic gastritis, negative H.Pylori. He started her on reglan PRN for possible gastroparesis. She has only taken it a few times for nausea and is unsure if it helps. She is scheduled for [facility] next week.  BRLabs frim 3/1: Amylase 35, Lipase 16, CBC H/H 14.2/41.8, CMP: ALP was elevated at 166. Ca slightly elevated at 10.6.BR

## 2019-03-14 NOTE — SERVICENOTES
Although pain is epigastric, if no source is found, consider colonoscopy for further assessment as she does have intermittent loose stools. Also consider IBcause stool and pancreatic elastase.

## 2019-03-15 ENCOUNTER — TRANSCRIBE ORDERS (OUTPATIENT)
Dept: ADMINISTRATIVE | Facility: HOSPITAL | Age: 84
End: 2019-03-15

## 2019-03-15 DIAGNOSIS — R10.13 EPIGASTRIC PAIN: Primary | ICD-10-CM

## 2019-03-21 ENCOUNTER — HOSPITAL ENCOUNTER (OUTPATIENT)
Dept: NUCLEAR MEDICINE | Facility: HOSPITAL | Age: 84
Discharge: HOME | End: 2019-03-21

## 2019-03-21 DIAGNOSIS — K29.00 OTHER ACUTE GASTRITIS WITHOUT HEMORRHAGE: ICD-10-CM

## 2019-03-21 PROCEDURE — 78264 GASTRIC EMPTYING IMG STUDY: CPT

## 2019-03-21 PROCEDURE — A9541 TC99M SULFUR COLLOID: HCPCS | Performed by: INTERNAL MEDICINE

## 2019-03-21 PROCEDURE — 0 TECHNETIUM SULFUR COLLOID: Performed by: INTERNAL MEDICINE

## 2019-03-21 RX ADMIN — TECHNETIUM TC 99M SULFUR COLLOID 1 DOSE: KIT at 07:02

## 2019-03-25 ENCOUNTER — HOSPITAL ENCOUNTER (OUTPATIENT)
Dept: MRI IMAGING | Facility: HOSPITAL | Age: 84
End: 2019-03-25

## 2019-03-28 ENCOUNTER — OFFICE (OUTPATIENT)
Dept: URBAN - METROPOLITAN AREA CLINIC 75 | Facility: CLINIC | Age: 84
End: 2019-03-28

## 2019-03-28 VITALS
HEART RATE: 84 BPM | DIASTOLIC BLOOD PRESSURE: 88 MMHG | SYSTOLIC BLOOD PRESSURE: 136 MMHG | WEIGHT: 171 LBS | HEIGHT: 65 IN

## 2019-03-28 DIAGNOSIS — K30 FUNCTIONAL DYSPEPSIA: ICD-10-CM

## 2019-03-28 DIAGNOSIS — K59.00 CONSTIPATION, UNSPECIFIED: ICD-10-CM

## 2019-03-28 DIAGNOSIS — R10.13 EPIGASTRIC PAIN: ICD-10-CM

## 2019-03-28 PROCEDURE — 99213 OFFICE O/P EST LOW 20 MIN: CPT

## 2019-05-28 ENCOUNTER — OFFICE (OUTPATIENT)
Dept: URBAN - METROPOLITAN AREA CLINIC 75 | Facility: CLINIC | Age: 84
End: 2019-05-28

## 2019-05-28 VITALS
DIASTOLIC BLOOD PRESSURE: 80 MMHG | HEART RATE: 84 BPM | HEIGHT: 65 IN | WEIGHT: 162 LBS | SYSTOLIC BLOOD PRESSURE: 120 MMHG

## 2019-05-28 DIAGNOSIS — R19.4 CHANGE IN BOWEL HABIT: ICD-10-CM

## 2019-05-28 DIAGNOSIS — K59.00 CONSTIPATION, UNSPECIFIED: ICD-10-CM

## 2019-05-28 DIAGNOSIS — K44.9 DIAPHRAGMATIC HERNIA WITHOUT OBSTRUCTION OR GANGRENE: ICD-10-CM

## 2019-05-28 DIAGNOSIS — K30 FUNCTIONAL DYSPEPSIA: ICD-10-CM

## 2019-05-28 DIAGNOSIS — R63.0 ANOREXIA: ICD-10-CM

## 2019-05-28 DIAGNOSIS — R10.13 EPIGASTRIC PAIN: ICD-10-CM

## 2019-05-28 PROCEDURE — 99215 OFFICE O/P EST HI 40 MIN: CPT | Performed by: INTERNAL MEDICINE

## 2019-06-27 ENCOUNTER — OFFICE (OUTPATIENT)
Dept: URBAN - METROPOLITAN AREA CLINIC 75 | Facility: CLINIC | Age: 84
End: 2019-06-27

## 2019-06-27 VITALS
HEIGHT: 65 IN | DIASTOLIC BLOOD PRESSURE: 62 MMHG | HEART RATE: 76 BPM | SYSTOLIC BLOOD PRESSURE: 122 MMHG | WEIGHT: 169 LBS

## 2019-06-27 DIAGNOSIS — K30 FUNCTIONAL DYSPEPSIA: ICD-10-CM

## 2019-06-27 DIAGNOSIS — K59.00 CONSTIPATION, UNSPECIFIED: ICD-10-CM

## 2019-06-27 PROCEDURE — 99213 OFFICE O/P EST LOW 20 MIN: CPT

## 2019-07-30 ENCOUNTER — OFFICE (OUTPATIENT)
Dept: URBAN - METROPOLITAN AREA CLINIC 75 | Facility: CLINIC | Age: 84
End: 2019-07-30

## 2019-07-30 VITALS
HEART RATE: 82 BPM | DIASTOLIC BLOOD PRESSURE: 78 MMHG | SYSTOLIC BLOOD PRESSURE: 124 MMHG | WEIGHT: 171 LBS | HEIGHT: 65 IN

## 2019-07-30 DIAGNOSIS — L30.9 DERMATITIS, UNSPECIFIED: ICD-10-CM

## 2019-07-30 PROCEDURE — 99214 OFFICE O/P EST MOD 30 MIN: CPT

## 2019-07-30 RX ORDER — HYDROCORTISONE 25 MG/G
CREAM TOPICAL
Qty: 30 | Refills: 1 | Status: COMPLETED
Start: 2019-07-30 | End: 2020-01-06

## 2019-08-21 ENCOUNTER — OFFICE VISIT (OUTPATIENT)
Dept: NEUROLOGY | Facility: CLINIC | Age: 84
End: 2019-08-21

## 2019-08-21 VITALS
RESPIRATION RATE: 15 BRPM | BODY MASS INDEX: 31.39 KG/M2 | WEIGHT: 188.4 LBS | SYSTOLIC BLOOD PRESSURE: 140 MMHG | OXYGEN SATURATION: 98 % | HEIGHT: 65 IN | DIASTOLIC BLOOD PRESSURE: 84 MMHG | HEART RATE: 70 BPM

## 2019-08-21 DIAGNOSIS — E78.5 HYPERLIPIDEMIA LDL GOAL <70: ICD-10-CM

## 2019-08-21 DIAGNOSIS — I63.10 CEREBROVASCULAR ACCIDENT (CVA) DUE TO EMBOLISM OF PRECEREBRAL ARTERY (HCC): Primary | ICD-10-CM

## 2019-08-21 DIAGNOSIS — I10 ESSENTIAL HYPERTENSION: ICD-10-CM

## 2019-08-21 DIAGNOSIS — M54.2 NECK PAIN: ICD-10-CM

## 2019-08-21 PROCEDURE — 99214 OFFICE O/P EST MOD 30 MIN: CPT | Performed by: NURSE PRACTITIONER

## 2019-08-21 RX ORDER — OMEPRAZOLE 20 MG/1
20 CAPSULE, DELAYED RELEASE ORAL DAILY
COMMUNITY
End: 2019-08-21 | Stop reason: ALTCHOICE

## 2019-08-21 RX ORDER — NYSTATIN 100000 [USP'U]/G
POWDER TOPICAL
Refills: 3 | COMMUNITY
Start: 2019-08-16 | End: 2019-08-21 | Stop reason: ALTCHOICE

## 2019-08-21 RX ORDER — ONDANSETRON 4 MG/1
TABLET, FILM COATED ORAL
Refills: 3 | COMMUNITY
Start: 2019-06-08 | End: 2020-02-24

## 2019-08-21 NOTE — PROGRESS NOTES
DOS: 2019  NAME: Katja Klein   : 1935  PCP: Opal Marina MD    Chief Complaint   Patient presents with   • Cerebrovascular Accident     9 month, follow up      SUBJECTIVE  Neurological Problem:  84 y.o. RHW female with HTN, osteoarthritis and h/o b/l CTS who presents today for follow-up of stroke. She is unaccompanied.    Interval History:   - 2018: Ms. Klein presented on 18 with transient right arm numbness, tingling and weakness.  She was found to have small punctate infarcts in the left hemisphere. He vessel imaging shows areas of atherosclerosis but no significant stenosis. MRI C-spine with no cord impingement. Her TTE was unrevealing. She was not on any anti-thrombotic PTA.  She was discharged on aspirin, Plavix and Lipitor 40 mg along with prolonged cardiac monitoring was negative for any evidence of A. fib.  She was also discharged on B12 replacement.    She presents today and continues on the same medication regimen and is tolerating medications well.  So continues to get monthly B12 injections. She reports having left sided neck pain since her stroke that has gotten progressively with minimal radiation into back of head, denies any radiation down her arm. Occassionally on the right, but primarily and greater severity on the left. Worse with turning head, occurs at about once a week, lasting only 10 seconds. She denies any associated weakness in neck or arms. She denies headaces, vision changes or any stroke/TIA symtpoms.     She is followed by GI for stomach issues, recently started on a medication from [facility] and getting regular EKGs sees Sathish ([facility]), possibly may need gastric stimulator.  He denies any other changes in her health.  She denies any signs or symptoms of sleep apnea.  She denies smoking.  Rare alcohol use.    Review of Systems:Review of Systems   Constitutional: Negative for activity change, appetite change and fatigue.   HENT:  Negative for facial swelling, trouble swallowing and voice change.    Eyes: Negative for photophobia, pain and visual disturbance.   Respiratory: Negative for chest tightness, shortness of breath and wheezing.    Cardiovascular: Positive for leg swelling. Negative for chest pain and palpitations.   Gastrointestinal: Negative for abdominal pain, constipation, diarrhea, nausea and vomiting.   Endocrine: Negative for polydipsia and polyphagia.   Musculoskeletal: Positive for neck pain. Negative for back pain, gait problem, joint swelling and neck stiffness.   Skin: Negative for rash and wound.   Neurological: Negative for dizziness, tremors, seizures, syncope, facial asymmetry, speech difficulty, weakness, light-headedness, numbness and headaches.   Hematological: Bruises/bleeds easily.   Psychiatric/Behavioral: Negative for agitation, behavioral problems, confusion, decreased concentration, dysphoric mood, hallucinations, self-injury, sleep disturbance and suicidal ideas. The patient is nervous/anxious. The patient is not hyperactive.     Above ROS reviewed    Current Medications:   Current Outpatient Medications:   •  amLODIPine (NORVASC) 10 MG tablet, Take 10 mg by mouth Daily., Disp: , Rfl:   •  aspirin 81 MG tablet, Take 81 mg by mouth Daily., Disp: , Rfl:   •  atorvastatin (LIPITOR) 40 MG tablet, Take 40 mg by mouth Daily., Disp: , Rfl:   •  cholecalciferol (VITAMIN D3) 1000 units tablet, Take 1,000 Units by mouth Daily., Disp: , Rfl:   •  Cyanocobalamin 1000 MCG/ML kit, Inject  as directed., Disp: , Rfl:   •  lisinopril (PRINIVIL,ZESTRIL) 40 MG tablet, Take 40 mg by mouth 2 (Two) Times a Day., Disp: , Rfl:   •  ondansetron (ZOFRAN) 4 MG tablet, TK 1 T PO  Q 6 H PRN FOR NAUSEA, Disp: , Rfl: 3  •  psyllium (METAMUCIL) 0.52 g capsule, Take 0.52 g by mouth Daily., Disp: , Rfl:   •  raNITIdine (ZANTAC) 300 MG tablet, Take 300 mg by mouth Daily., Disp: , Rfl:   •  fluticasone (FLONASE) 50 MCG/ACT nasal spray, 2 sprays  into the nostril(s) as directed by provider Daily As Needed for Rhinitis., Disp: , Rfl:   •  hydrocortisone 2.5 % cream, RAJENDRA SML AMT EXT AA BID, Disp: , Rfl: 1     **Patient also reports being on Motilium (Teva-domperidone) per GI (gets from [facility])    The following portions of the patient's history were reviewed and updated as appropriate: allergies, current medications, past family history, past medical history, past social history, past surgical history and problem list.    OBJECTIVE  Vitals:    08/21/19 1031   BP: 140/84   Pulse: 70   Resp: 15   SpO2: 98%       Diagnostics:  MRI cervical spine 8/22/18: IMPRESSION:  Essentially unremarkable MRI of the cervical spine,  particularly for a patient of this age. There is no significant disc  bulging or focal disc herniation or encroachment on the spinal canal at  any level.    Laboratory Results:         Lab Results   Component Value Date    WBC 8.35 03/06/2019    HGB 14.4 03/06/2019    HCT 44.1 03/06/2019    MCV 93.4 03/06/2019     03/06/2019     Lab Results   Component Value Date    GLUCOSE 108 (H) 03/06/2019    BUN 10 03/06/2019    CREATININE 0.80 03/06/2019    EGFRIFNONA 63 03/06/2019    BCR 11.6 03/06/2019    K 4.8 03/06/2019    CO2 24.6 03/06/2019    CALCIUM 10.9 (H) 03/06/2019    ALBUMIN 4.80 03/06/2019    AST 20 03/06/2019    ALT 18 03/06/2019     Lab Results   Component Value Date    HGBA1C 5.30 08/22/2018     Lab Results   Component Value Date    CHOL 155 08/22/2018     Lab Results   Component Value Date    HDL 68 (H) 08/22/2018     Lab Results   Component Value Date    LDL 73 08/22/2018     Lab Results   Component Value Date    TRIG 68 08/22/2018     No results found for: RPR  Lab Results   Component Value Date    TSH 2.810 08/22/2018     Lab Results   Component Value Date    YNMLWGSO36 160 (L) 08/22/2018       Physical Examination:   General Appearance:   Well developed, well nourished, well groomed, alert, and cooperative.  HEENT: Normocephalic.     Neck and Spine: Normal range of motion.  Normal alignment. No mass or tenderness. No bruits.  Cardiac: Regular rate and rhythm.   Peripheral Vasculature: Radial pulses are equal and symmetric. No signs of distal embolization.  Extremities:    No edema or deformities. Normal joint ROM.  Skin:    No rashes or birth marks.  Psychiatric:    Euthymic. Normal affect.    Neurological examination:  Higher Integrative  Function: Oriented to time, place and person. Normal registration, recall (2/3 with 1 clue), attention span and concentration. Normal language including comprehension, spontaneous speech, repetition, reading, naming and vocabulary. No neglect. Normal fund of knowledge and higher integrative function.  CN II: Pupils are equal, round, and reactive to light. Normal visual acuity and visual fields.    CN III IV VI: Extraocular movements are full without nystagmus.   CN V: Normal facial sensation and strength of muscles of mastication.  CN VII: Facial movements are symmetric. No weakness.  CN VIII:   Auditory acuity is normal.  CN IX & X:   Symmetric palatal movement.  CN XI: Sternocleidomastoid and trapezius are normal.  No weakness.  CN XII:   The tongue is midline.  No atrophy or fasciculations.  Motor: Normal muscle strength, bulk and tone in upper and lower extremities.  No fasciculations, rigidity, spasticity, or abnormal movements.   Sensation: Normal to light touch, vibration, temperature, and proprioception in arms and legs. Normal graphesthesia and no extinction on DSS.  Station and Gait: Normal gait and station.    Coordination: Finger to nose test shows no dysmetria.  Heel to shin normal.    Impression: Ms. Klein continues to do well following her small left hemispheric stroke she suffered in August 2018.  The etiology of her event was unclear however likely due to atherosclerotic disease as her cardiac evaluation was unrevealing including TTE and prolonged monitoring.  She has been maintained on ASA  and Lipitor with no recurrent or new stroke/TIA symptoms.  She does have complaints today however of intermittent neck pain, primarily on the left, no radiculopathy.  RI C-spine in August of last year was unremarkable.  We discussed PT, she declines at this time.  Reviewed the signs and symptoms of stroke and the importance of calling 911 if she were to develop any of these.  She will follow-up in 1 year, sooner if symptoms warrant.    Plan:     1. Stroke  Continue ASA, Lipitor  Monitor BP regularly  Consider evaluation for sleep apnea  Secondary stroke prevention: Ideal targets for stroke prevention would be Blood pressure < 130/80; B12 > 500 TSH in normal range and LDL < 70; HbA1c < 6.5 and smoking cessation if applicable.  Call 911 for stroke symptoms    2. Neck pain  No radiculopathy  Consider referral to PT  Follow-up one year    I spent 30 minutes face to face with patient, with  > 50% spent counseling patient regarding diagnosis, review of diagnostics, personal risk factors for stroke and importance of risk factor control for stroke prevention, including lifestyle modifications and future preventative measures.   Katja was seen today for cerebrovascular accident.    Diagnoses and all orders for this visit:    Cerebrovascular accident (CVA) due to embolism of precerebral artery (CMS/HCC)    Essential hypertension    Hyperlipidemia LDL goal <70    Neck pain        Coding      Dictated using Dragon

## 2019-08-26 ENCOUNTER — TRANSCRIBE ORDERS (OUTPATIENT)
Dept: ADMINISTRATIVE | Facility: HOSPITAL | Age: 84
End: 2019-08-26

## 2019-08-26 DIAGNOSIS — Z12.31 SCREENING MAMMOGRAM, ENCOUNTER FOR: Primary | ICD-10-CM

## 2019-08-27 ENCOUNTER — OFFICE (OUTPATIENT)
Dept: URBAN - METROPOLITAN AREA CLINIC 75 | Facility: CLINIC | Age: 84
End: 2019-08-27

## 2019-08-27 VITALS
WEIGHT: 172 LBS | DIASTOLIC BLOOD PRESSURE: 94 MMHG | SYSTOLIC BLOOD PRESSURE: 140 MMHG | HEIGHT: 65 IN | HEART RATE: 84 BPM

## 2019-08-27 DIAGNOSIS — K30 FUNCTIONAL DYSPEPSIA: ICD-10-CM

## 2019-08-27 DIAGNOSIS — K59.00 CONSTIPATION, UNSPECIFIED: ICD-10-CM

## 2019-08-27 PROCEDURE — 99213 OFFICE O/P EST LOW 20 MIN: CPT

## 2019-10-21 ENCOUNTER — HOSPITAL ENCOUNTER (OUTPATIENT)
Dept: MAMMOGRAPHY | Facility: HOSPITAL | Age: 84
Discharge: HOME | End: 2019-10-21
Admitting: FAMILY MEDICINE

## 2019-10-21 DIAGNOSIS — Z12.31 SCREENING MAMMOGRAM, ENCOUNTER FOR: ICD-10-CM

## 2019-10-21 PROCEDURE — 77063 BREAST TOMOSYNTHESIS BI: CPT

## 2019-10-21 PROCEDURE — 77067 SCR MAMMO BI INCL CAD: CPT

## 2020-01-06 ENCOUNTER — OFFICE (OUTPATIENT)
Dept: URBAN - METROPOLITAN AREA CLINIC 75 | Facility: CLINIC | Age: 85
End: 2020-01-06

## 2020-01-06 VITALS
SYSTOLIC BLOOD PRESSURE: 128 MMHG | HEART RATE: 97 BPM | OXYGEN SATURATION: 98 % | DIASTOLIC BLOOD PRESSURE: 72 MMHG | WEIGHT: 164 LBS | HEIGHT: 65 IN

## 2020-01-06 DIAGNOSIS — R10.30 LOWER ABDOMINAL PAIN, UNSPECIFIED: ICD-10-CM

## 2020-01-06 DIAGNOSIS — R19.4 CHANGE IN BOWEL HABIT: ICD-10-CM

## 2020-01-06 DIAGNOSIS — R19.7 DIARRHEA, UNSPECIFIED: ICD-10-CM

## 2020-01-06 DIAGNOSIS — K31.84 GASTROPARESIS: ICD-10-CM

## 2020-01-06 DIAGNOSIS — R10.13 EPIGASTRIC PAIN: ICD-10-CM

## 2020-01-06 DIAGNOSIS — R11.0 NAUSEA: ICD-10-CM

## 2020-01-06 DIAGNOSIS — K30 FUNCTIONAL DYSPEPSIA: ICD-10-CM

## 2020-01-06 PROCEDURE — 99214 OFFICE O/P EST MOD 30 MIN: CPT

## 2020-01-06 RX ORDER — FAMOTIDINE 20 MG/1
80 TABLET, FILM COATED ORAL
Qty: 120 | Refills: 5 | Status: ACTIVE
Start: 2019-12-26

## 2020-01-06 RX ORDER — DICYCLOMINE HYDROCHLORIDE 10 MG/1
40 CAPSULE ORAL
Qty: 30 | Refills: 2 | Status: COMPLETED
Start: 2020-01-06 | End: 2021-04-27

## 2020-01-15 ENCOUNTER — TRANSCRIBE ORDERS (OUTPATIENT)
Dept: ADMINISTRATIVE | Facility: HOSPITAL | Age: 85
End: 2020-01-15

## 2020-01-15 DIAGNOSIS — Z87.19 HISTORY OF EPIGASTRIC PAIN: Primary | ICD-10-CM

## 2020-01-23 ENCOUNTER — HOSPITAL ENCOUNTER (OUTPATIENT)
Dept: CT IMAGING | Facility: HOSPITAL | Age: 85
Discharge: HOME | End: 2020-01-23
Admitting: PHYSICIAN ASSISTANT

## 2020-01-23 DIAGNOSIS — Z87.19 HISTORY OF EPIGASTRIC PAIN: ICD-10-CM

## 2020-01-23 LAB — CREAT BLDA-MCNC: 0.6 MG/DL (ref 0.6–1.3)

## 2020-01-23 PROCEDURE — 25010000002 IOPAMIDOL 61 % SOLUTION: Performed by: PHYSICIAN ASSISTANT

## 2020-01-23 PROCEDURE — 74177 CT ABD & PELVIS W/CONTRAST: CPT

## 2020-01-23 PROCEDURE — 0 DIATRIZOATE MEGLUMINE & SODIUM PER 1 ML: Performed by: PHYSICIAN ASSISTANT

## 2020-01-23 PROCEDURE — 82565 ASSAY OF CREATININE: CPT

## 2020-01-23 RX ADMIN — IOPAMIDOL 85 ML: 612 INJECTION, SOLUTION INTRAVENOUS at 14:23

## 2020-01-23 RX ADMIN — DIATRIZOATE MEGLUMINE AND DIATRIZOATE SODIUM 30 ML: 660; 100 LIQUID ORAL; RECTAL at 13:00

## 2020-02-24 ENCOUNTER — OFFICE VISIT (OUTPATIENT)
Dept: CARDIOLOGY | Facility: CLINIC | Age: 85
End: 2020-02-24

## 2020-02-24 VITALS
HEART RATE: 92 BPM | OXYGEN SATURATION: 98 % | SYSTOLIC BLOOD PRESSURE: 160 MMHG | DIASTOLIC BLOOD PRESSURE: 100 MMHG | RESPIRATION RATE: 16 BRPM | BODY MASS INDEX: 27.66 KG/M2 | WEIGHT: 166 LBS | HEIGHT: 65 IN

## 2020-02-24 DIAGNOSIS — R00.2 PALPITATIONS: Primary | ICD-10-CM

## 2020-02-24 PROCEDURE — 99203 OFFICE O/P NEW LOW 30 MIN: CPT | Performed by: INTERNAL MEDICINE

## 2020-02-24 PROCEDURE — 93000 ELECTROCARDIOGRAM COMPLETE: CPT | Performed by: INTERNAL MEDICINE

## 2020-02-24 RX ORDER — DICYCLOMINE HYDROCHLORIDE 10 MG/1
CAPSULE ORAL
COMMUNITY
Start: 2020-01-06

## 2020-02-24 RX ORDER — FAMOTIDINE 20 MG/1
20 TABLET, FILM COATED ORAL 2 TIMES DAILY
COMMUNITY
End: 2021-02-26 | Stop reason: HOSPADM

## 2020-02-24 NOTE — PROGRESS NOTES
[facility]      Patient Name: Katja Klein  :1935  Age: 84 y.o.  Encounter Provider:  Jose G Marsh Jr, MD      Chief Complaint:   Chief Complaint   Patient presents with   • Palpitations         HPI  Katja Klein is a 84 y.o. female past medical history of hypertension and dyslipidemia who presents for evaluation of palpitations.  Patient noted a fleeting episode of fluttering sensation within the chest approximately 1 week ago.  She denies any chest pain, shortness of air, dizziness or syncope.  No orthopnea, PND or edema.  Patient is a former smoker quit many years ago and denies alcohol or illicit drug use.  Family history was reviewed and is not pertinent to this clinic visit.  Patient is fairly active and does all of her own cooking and cleaning without limiting symptoms.  She denies any other complaints at this time.      The following portions of the patient's history were reviewed and updated as appropriate: allergies, current medications, past family history, past medical history, past social history, past surgical history and problem list.      Review of Systems   Constitution: Negative for chills and fever.   HENT: Negative for hoarse voice and sore throat.    Eyes: Negative for double vision and photophobia.   Cardiovascular: Positive for palpitations. Negative for chest pain, leg swelling, near-syncope, orthopnea, paroxysmal nocturnal dyspnea and syncope.   Respiratory: Negative for cough and wheezing.    Skin: Negative for poor wound healing and rash.   Musculoskeletal: Negative for arthritis and joint swelling.   Gastrointestinal: Negative for bloating, abdominal pain, hematemesis and hematochezia.   Neurological: Negative for dizziness and focal weakness.   Psychiatric/Behavioral: Negative for depression and suicidal ideas.       OBJECTIVE:   Vital Signs  Vitals:    20 1245   BP: 160/100   Pulse: 92   Resp: 16   SpO2: 98%     Estimated body mass index is  "27.62 kg/m² as calculated from the following:    Height as of this encounter: 165.1 cm (65\").    Weight as of this encounter: 75.3 kg (166 lb).    Physical Exam   Constitutional: She is oriented to person, place, and time. She appears well-developed and well-nourished.   HENT:   Head: Normocephalic and atraumatic.   Eyes: Pupils are equal, round, and reactive to light. Conjunctivae are normal.   Neck: No JVD present. No thyromegaly present.   Cardiovascular: Exam reveals no gallop and no friction rub.   No murmur heard.  Pulmonary/Chest: No respiratory distress. She exhibits no tenderness.   Abdominal: Bowel sounds are normal. She exhibits no distension.   Musculoskeletal: She exhibits no edema or tenderness.   Neurological: She is alert and oriented to person, place, and time.   Skin: No rash noted. No erythema.   Psychiatric: She has a normal mood and affect. Judgment normal.   Vitals reviewed.        ECG 12 Lead  Date/Time: 2/24/2020 2:00 PM  Performed by: Jose G Marsh Jr., MD  Authorized by: Jose G Marsh Jr., MD   Comparison: compared with previous ECG from 12/16/2018  Comparison to previous ECG: Isolated PAC and PVC present on current tracing  Rhythm: sinus rhythm  Ectopy: unifocal PVCs and atrial premature contractions    Clinical impression: abnormal EKG                  ASSESSMENT:     Palpitations  Hypertension  Dyslipidemia    PLAN OF CARE:     1. Palpitations -fleeting episode sounds to be isolated per patient report.  We will continue to monitor symptoms.  She had a Holter monitor in 2018 which showed nothing more than isolated PACs.  We will consider further testing if symptoms increase in frequency or intensity.  2. Hypertension -elevated today in clinic.  Patient has not been keeping home blood pressure log.  She will keep a twice daily blood pressure log and send that in after 1 week.  Sodium restriction as counseled.  3. Dyslipidemia -continue statin.  We will request records from PCP " office.    Return to clinic 3 months             Discharge Medications           Accurate as of February 24, 2020  1:56 PM. If you have any questions, ask your nurse or doctor.               Continue These Medications      Instructions Start Date   aspirin 81 MG tablet   81 mg, Oral, Daily      atorvastatin 40 MG tablet  Commonly known as:  LIPITOR   40 mg, Oral, Daily      Cyanocobalamin 1000 MCG/ML kit   Injection      dicyclomine 10 MG capsule  Commonly known as:  BENTYL   TK 1 C PO Q 6 H PRF CRAMPING      famotidine 20 MG tablet  Commonly known as:  PEPCID   20 mg, Oral, 2 Times Daily      fluticasone 50 MCG/ACT nasal spray  Commonly known as:  FLONASE   2 sprays, Nasal, Daily PRN      IBGARD PO   Oral      lisinopril 40 MG tablet  Commonly known as:  PRINIVIL,ZESTRIL   40 mg, Oral, 2 Times Daily      METAMUCIL 0.52 g capsule  Generic drug:  psyllium   0.52 g, Oral, Daily         Stop These Medications    amLODIPine 10 MG tablet  Commonly known as:  NORVASC  Stopped by:  Jose G Marsh Jr, MD     cholecalciferol 25 MCG (1000 UT) tablet  Commonly known as:  VITAMIN D3  Stopped by:  Jose G Marsh Jr, MD     hydrocortisone 2.5 % cream  Stopped by:  Jose G Marsh Jr, MD     ondansetron 4 MG tablet  Commonly known as:  ZOFRAN  Stopped by:  Jose G Marsh Jr, MD     raNITIdine 300 MG tablet  Commonly known as:  ZANTAC  Stopped by:  Jose G Marsh Jr, MD            Thank you for allowing me to participate in the care of your patient,      Sincerely,   Jose G aMrsh Jr, MD  [facility]  02/24/20  1:56 PM

## 2020-03-03 ENCOUNTER — OFFICE (OUTPATIENT)
Dept: URBAN - METROPOLITAN AREA CLINIC 75 | Facility: CLINIC | Age: 85
End: 2020-03-03

## 2020-03-03 VITALS
SYSTOLIC BLOOD PRESSURE: 136 MMHG | HEART RATE: 79 BPM | HEIGHT: 65 IN | WEIGHT: 162 LBS | DIASTOLIC BLOOD PRESSURE: 84 MMHG

## 2020-03-03 DIAGNOSIS — K31.84 GASTROPARESIS: ICD-10-CM

## 2020-03-03 DIAGNOSIS — R11.0 NAUSEA: ICD-10-CM

## 2020-03-03 DIAGNOSIS — R10.13 EPIGASTRIC PAIN: ICD-10-CM

## 2020-03-03 PROCEDURE — 99214 OFFICE O/P EST MOD 30 MIN: CPT

## 2020-03-03 RX ORDER — FAMOTIDINE 20 MG/1
40 TABLET, FILM COATED ORAL
Qty: 180 | Refills: 1 | Status: ACTIVE
Start: 2020-03-03

## 2020-05-18 ENCOUNTER — TELEPHONE (OUTPATIENT)
Dept: CARDIOLOGY | Facility: CLINIC | Age: 85
End: 2020-05-18

## 2020-06-24 ENCOUNTER — TRANSCRIBE ORDERS (OUTPATIENT)
Dept: ADMINISTRATIVE | Facility: HOSPITAL | Age: 85
End: 2020-06-24

## 2020-06-24 DIAGNOSIS — R93.5 ABNORMAL CT OF THE ABDOMEN: Primary | ICD-10-CM

## 2020-07-10 ENCOUNTER — TRANSCRIBE ORDERS (OUTPATIENT)
Dept: ADMINISTRATIVE | Facility: HOSPITAL | Age: 85
End: 2020-07-10

## 2020-07-10 DIAGNOSIS — R93.3 ABNORMAL COMPUTED TOMOGRAPHY OF GASTROINTESTINAL TRACT: Primary | ICD-10-CM

## 2020-07-10 PROBLEM — R10.11 RIGHT UPPER QUADRANT PAIN: Status: ACTIVE | Noted: 2019-03-07

## 2020-07-10 PROBLEM — K44.9 DIAPHRAGMATIC HERNIA WITHOUT OBSTRUCTION OR GANGRENE: Status: ACTIVE | Noted: 2019-03-07

## 2020-07-20 ENCOUNTER — HOSPITAL ENCOUNTER (OUTPATIENT)
Dept: CT IMAGING | Facility: HOSPITAL | Age: 85
Discharge: HOME | End: 2020-07-20
Admitting: INTERNAL MEDICINE

## 2020-07-20 ENCOUNTER — APPOINTMENT (OUTPATIENT)
Dept: CT IMAGING | Facility: HOSPITAL | Age: 85
End: 2020-07-20

## 2020-07-20 DIAGNOSIS — R93.3 ABNORMAL COMPUTED TOMOGRAPHY OF GASTROINTESTINAL TRACT: ICD-10-CM

## 2020-07-20 PROCEDURE — 25010000002 IOPAMIDOL 61 % SOLUTION: Performed by: INTERNAL MEDICINE

## 2020-07-20 PROCEDURE — 0 DIATRIZOATE MEGLUMINE & SODIUM PER 1 ML: Performed by: INTERNAL MEDICINE

## 2020-07-20 PROCEDURE — 82565 ASSAY OF CREATININE: CPT

## 2020-07-20 PROCEDURE — 74177 CT ABD & PELVIS W/CONTRAST: CPT

## 2020-07-20 RX ADMIN — IOPAMIDOL 75 ML: 612 INJECTION, SOLUTION INTRAVENOUS at 14:09

## 2020-07-20 RX ADMIN — DIATRIZOATE MEGLUMINE AND DIATRIZOATE SODIUM 30 ML: 660; 100 LIQUID ORAL; RECTAL at 13:00

## 2020-07-22 LAB — CREAT BLDA-MCNC: 0.6 MG/DL (ref 0.6–1.3)

## 2020-07-29 ENCOUNTER — TRANSCRIBE ORDERS (OUTPATIENT)
Dept: ADMINISTRATIVE | Facility: HOSPITAL | Age: 85
End: 2020-07-29

## 2020-07-29 DIAGNOSIS — N83.209 CYST OF OVARY, UNSPECIFIED LATERALITY: Primary | ICD-10-CM

## 2020-07-30 ENCOUNTER — APPOINTMENT (OUTPATIENT)
Dept: CT IMAGING | Facility: HOSPITAL | Age: 85
End: 2020-07-30

## 2020-08-11 ENCOUNTER — HOSPITAL ENCOUNTER (OUTPATIENT)
Dept: ULTRASOUND IMAGING | Facility: HOSPITAL | Age: 85
Discharge: HOME | End: 2020-08-11
Admitting: FAMILY MEDICINE

## 2020-08-11 DIAGNOSIS — N83.209 CYST OF OVARY, UNSPECIFIED LATERALITY: ICD-10-CM

## 2020-08-11 PROCEDURE — 76856 US EXAM PELVIC COMPLETE: CPT

## 2020-08-11 PROCEDURE — 76857 US EXAM PELVIC LIMITED: CPT

## 2020-08-11 PROCEDURE — 76830 TRANSVAGINAL US NON-OB: CPT

## 2020-08-19 ENCOUNTER — OFFICE VISIT (OUTPATIENT)
Dept: NEUROLOGY | Facility: CLINIC | Age: 85
End: 2020-08-19

## 2020-08-19 VITALS
DIASTOLIC BLOOD PRESSURE: 80 MMHG | BODY MASS INDEX: 28.37 KG/M2 | SYSTOLIC BLOOD PRESSURE: 118 MMHG | OXYGEN SATURATION: 98 % | HEART RATE: 73 BPM | HEIGHT: 65 IN | WEIGHT: 170.3 LBS

## 2020-08-19 DIAGNOSIS — E78.5 HYPERLIPIDEMIA LDL GOAL <70: ICD-10-CM

## 2020-08-19 DIAGNOSIS — I10 ESSENTIAL HYPERTENSION: ICD-10-CM

## 2020-08-19 DIAGNOSIS — M54.2 NECK PAIN: ICD-10-CM

## 2020-08-19 DIAGNOSIS — I63.10 CEREBROVASCULAR ACCIDENT (CVA) DUE TO EMBOLISM OF PRECEREBRAL ARTERY (HCC): Primary | ICD-10-CM

## 2020-08-19 PROCEDURE — 99214 OFFICE O/P EST MOD 30 MIN: CPT | Performed by: NURSE PRACTITIONER

## 2020-08-19 RX ORDER — CYANOCOBALAMIN 1000 UG/ML
INJECTION, SOLUTION INTRAMUSCULAR; SUBCUTANEOUS
COMMUNITY
Start: 2020-05-22 | End: 2021-02-26 | Stop reason: HOSPADM

## 2020-08-19 RX ORDER — TRIAMCINOLONE ACETONIDE 1 MG/G
CREAM TOPICAL
COMMUNITY
Start: 2020-05-22 | End: 2021-02-26 | Stop reason: HOSPADM

## 2020-08-19 RX ORDER — AMLODIPINE BESYLATE 5 MG/1
5 TABLET ORAL DAILY
COMMUNITY
Start: 2020-05-14

## 2020-08-19 NOTE — PROGRESS NOTES
"DOS: 2020  NAME: Katja Klein   : 1935  PCP: Opal Marina MD    Chief Complaint   Patient presents with   • Stroke      SUBJECTIVE  Neurological Problem:  85 y.o.  RHW female with HTN, osteoarthritis and h/o b/l CTS who presents today for follow-up of stroke. She is unaccompanied.    Interval History:   - 2018: Ms. Klein presented on 18 with transient right arm numbness, tingling and weakness.  She was found to have small punctate infarcts in the left hemisphere. He vessel imaging shows areas of atherosclerosis but no significant stenosis. MRI C-spine with no cord impingement. Her TTE was unrevealing. She was not on any anti-thrombotic PTA.  She was discharged on aspirin, Plavix and Lipitor 40 mg along with prolonged cardiac monitoring was negative for any evidence of A. fib.  She was also discharged on B12 replacement.    She was last seen here in 2019, continued on ASA and Lipitor, tolerating well. Had complaints of occasional neck pain, declined PT at that time. MRI C-spine in 2018 was unremarkable. She tells me today that her posterior neck pain is intermittent, when she turns her head to the left she feels like \"something is cut off\", like \"there is no flow\". States it was not painful prior but is now painful on posterior neck, no radiation down arm or in hands. No numbness/tingling or weakness.  No difficulty completing ADLs.  She denies any new stroke/TIA symptoms, does complain of subjective right sided numbness/tingling at times that is residual from stroke.   She reports hearing a \"ring\" at night that wakes her up, hears it only once, no other sounds, she goes right back to sleep. This has happened once every 3-4 months.  Denies any hallucinations.  She lives alone. She takes care of all her ADLs, drives, manages finances. She does have difficulty remembering names. No problems getting lost, no MVAs.  She follows up with her PCP for routine lab work, results " from 6/29/2020 show an unremarkable CBC, no recent lipid panel noted.  She denies any signs or symptoms of sleep apnea.  No formal testing. She denies smoking.  Rare alcohol use. No falls. Does not use any assistive device.     Review of Systems:Review of Systems   Constitutional: Negative for activity change, appetite change and fatigue.   HENT: Negative for ear pain, tinnitus and trouble swallowing.    Eyes: Negative for photophobia, pain and visual disturbance.   Musculoskeletal: Positive for neck pain. Negative for back pain and gait problem.   Neurological: Positive for numbness (Thumb on right hand). Negative for dizziness, tremors, seizures, syncope, facial asymmetry, speech difficulty, weakness, light-headedness and headaches.   Psychiatric/Behavioral: Positive for decreased concentration. Negative for agitation, behavioral problems, confusion, dysphoric mood, hallucinations, self-injury, sleep disturbance and suicidal ideas. The patient is not nervous/anxious and is not hyperactive.     Above ROS reviewed    The following portions of the patient's history were reviewed and updated as appropriate: allergies, current medications, past family history, past medical history, past social history, past surgical history and problem list.    Current Medications:   Current Outpatient Medications:   •  Acetaminophen (TYLENOL ARTHRITIS PAIN PO), Take  by mouth., Disp: , Rfl:   •  amLODIPine (NORVASC) 2.5 MG tablet, TK 1 T PO ONCE D, Disp: , Rfl:   •  aspirin 81 MG tablet, Take 81 mg by mouth Daily., Disp: , Rfl:   •  atorvastatin (LIPITOR) 40 MG tablet, Take 40 mg by mouth Daily., Disp: , Rfl:   •  cyanocobalamin 1000 MCG/ML injection, , Disp: , Rfl:   •  dicyclomine (BENTYL) 10 MG capsule, TK 1 C PO Q 6 H PRF CRAMPING, Disp: , Rfl:   •  famotidine (PEPCID) 20 MG tablet, Take 20 mg by mouth 2 (Two) Times a Day., Disp: , Rfl:   •  fluticasone (FLONASE) 50 MCG/ACT nasal spray, 2 sprays into the nostril(s) as directed by  provider Daily As Needed for Rhinitis., Disp: , Rfl:   •  lisinopril (PRINIVIL,ZESTRIL) 40 MG tablet, Take 40 mg by mouth 2 (Two) Times a Day., Disp: , Rfl:   •  Peppermint Oil (IBGARD PO), Take  by mouth., Disp: , Rfl:   •  triamcinolone (KENALOG) 0.1 % cream, RAJENDRA EXT AA BID, Disp: , Rfl:   •  psyllium (METAMUCIL) 0.52 g capsule, Take 0.52 g by mouth Daily., Disp: , Rfl:   **I did not stop or change the above medications.  Patient's medication list was updated to reflect medications they have reported as currently taking, including medication changes made by other providers.    OBJECTIVE  Vitals:    08/19/20 1245   BP: 118/80   Pulse: 73   SpO2: 98%     Body mass index is 28.34 kg/m².    Diagnostics:    Laboratory Results:         Lab Results   Component Value Date    WBC 8.35 03/06/2019    HGB 14.4 03/06/2019    HCT 44.1 03/06/2019    MCV 93.4 03/06/2019     03/06/2019     Lab Results   Component Value Date    GLUCOSE 108 (H) 03/06/2019    BUN 10 03/06/2019    CREATININE 0.60 07/20/2020    EGFRIFNONA 63 03/06/2019    BCR 11.6 03/06/2019    K 4.8 03/06/2019    CO2 24.6 03/06/2019    CALCIUM 10.9 (H) 03/06/2019    ALBUMIN 4.80 03/06/2019    AST 20 03/06/2019    ALT 18 03/06/2019     Lab Results   Component Value Date    HGBA1C 5.30 08/22/2018     Lab Results   Component Value Date    CHOL 155 08/22/2018     Lab Results   Component Value Date    HDL 68 (H) 08/22/2018     Lab Results   Component Value Date    LDL 73 08/22/2018     Lab Results   Component Value Date    TRIG 68 08/22/2018     No results found for: RPR  Lab Results   Component Value Date    TSH 2.810 08/22/2018     Lab Results   Component Value Date    YIPGKCQD24 160 (L) 08/22/2018       Physical Exam:  GENERAL: NAD  HEENT: Normocephalic, atraumatic. Normal ROM of neck, no tenderness to palpation or with movement.   COR: RRR  Resp: Even and unlabored  Extremities: Equal pulses, no signs of distal embolization.   Psychiatric: Normal mood and  affect.    Neurological:   MS: AO. Language normal. No neglect. Follows all commands.  CN: II-XII grossly normal  Motor: Normal strength and tone throughout.  Sensory: Intact to light touch, temperature and vibration in arms and legs except for decreased vibratory sensation below ankles bilaterally.   Station and Gait: Normal gait and station.    Coordination: Normal finger to nose bilaterally. Normal heel to shin b/l    Impression:  Ms. Klein presents for f/u of small left hemispheric stroke she suffered in August 2018, unclear etiology; however, likely due to atherosclerotic disease as her cardiac evaluation was unrevealing.  She has been maintained on ASA and Lipitor with no recurrent or new stroke/TIA symptoms and a normal neurologic exam today.  We reviewed her risk factors for stroke and importance of RF control for prevention.  She does again complain of intermittent neck pain, primarily on the left, again no radicular symptoms.  We discussed management options including repeating MRI C-spine, pain medications and or physical therapy.  Given the fact that her exam is stable from previous, repeating MRI C-spine not likely .  I recommended physical therapy; however, she indicates that symptoms seem to be improving and therefore would like to hold on this.  She will call if symptoms worsen for referral.  She voiced understanding and agrees with plan.    Plan:     1. Stroke  Continue ASA, Lipitor  F/U with PCP for continued cholesterol surveillance  Monitor BP regularly  Keep well hydrated  Consider evaluation for sleep apnea  Secondary stroke prevention: Ideal targets for stroke prevention would be Blood pressure < 130/80; B12 > 500 TSH in normal range and LDL < 70; HbA1c < 6.5 and smoking cessation if applicable.  Call 911 for stroke symptoms     2. Posterior neck pain  MRI C-spine in 2018 unremarkable, neuro exam today normal.   No radiculopathy, no weakness  Patient continues to decline PT--  will call if symptoms worsen    F/U in one year    Greater than 50% of this 35-minute visit was spent counseling patient regarding diagnosis, review of diagnostics, personal risk factors for stroke and importance of risk factor control for stroke prevention, including lifestyle modifications and preventative measures.   There are no diagnoses linked to this encounter.    Coding      Dictated using Dragon

## 2020-08-19 NOTE — PATIENT INSTRUCTIONS
Stroke Prevention  Some medical conditions and behaviors are associated with a higher chance of having a stroke. You can help prevent a stroke by making nutrition, lifestyle, and other changes, including managing any medical conditions you may have.  What nutrition changes can be made?    · Eat healthy foods. You can do this by:  ? Choosing foods high in fiber, such as fresh fruits and vegetables and whole grains.  ? Eating at least 5 or more servings of fruits and vegetables a day. Try to fill half of your plate at each meal with fruits and vegetables.  ? Choosing lean protein foods, such as lean cuts of meat, poultry without skin, fish, tofu, beans, and nuts.  ? Eating low-fat dairy products.  ? Avoiding foods that are high in salt (sodium). This can help lower blood pressure.  ? Avoiding foods that have saturated fat, trans fat, and cholesterol. This can help prevent high cholesterol.  ? Avoiding processed and premade foods.  · Follow your health care provider's specific guidelines for losing weight, controlling high blood pressure (hypertension), lowering high cholesterol, and managing diabetes. These may include:  ? Reducing your daily calorie intake.  ? Limiting your daily sodium intake to 1,500 milligrams (mg).  ? Using only healthy fats for cooking, such as olive oil, canola oil, or sunflower oil.  ? Counting your daily carbohydrate intake.  What lifestyle changes can be made?  · Maintain a healthy weight. Talk to your health care provider about your ideal weight.  · Get at least 30 minutes of moderate physical activity at least 5 days a week. Moderate activity includes brisk walking, biking, and swimming.  · Do not use any products that contain nicotine or tobacco, such as cigarettes and e-cigarettes. If you need help quitting, ask your health care provider. It may also be helpful to avoid exposure to secondhand smoke.  · Limit alcohol intake to no more than 1 drink a day for nonpregnant women and 2 drinks  a day for men. One drink equals 12 oz of beer, 5 oz of wine, or 1½ oz of hard liquor.  · Stop any illegal drug use.  · Avoid taking birth control pills. Talk to your health care provider about the risks of taking birth control pills if:  ? You are over 35 years old.  ? You smoke.  ? You get migraines.  ? You have ever had a blood clot.  What other changes can be made?  · Manage your cholesterol levels.  ? Eating a healthy diet is important for preventing high cholesterol. If cholesterol cannot be managed through diet alone, you may also need to take medicines.  ? Take any prescribed medicines to control your cholesterol as told by your health care provider.  · Manage your diabetes.  ? Eating a healthy diet and exercising regularly are important parts of managing your blood sugar. If your blood sugar cannot be managed through diet and exercise, you may need to take medicines.  ? Take any prescribed medicines to control your diabetes as told by your health care provider.  · Control your hypertension.  ? To reduce your risk of stroke, try to keep your blood pressure below 130/80.  ? Eating a healthy diet and exercising regularly are an important part of controlling your blood pressure. If your blood pressure cannot be managed through diet and exercise, you may need to take medicines.  ? Take any prescribed medicines to control hypertension as told by your health care provider.  ? Ask your health care provider if you should monitor your blood pressure at home.  ? Have your blood pressure checked every year, even if your blood pressure is normal. Blood pressure increases with age and some medical conditions.  · Get evaluated for sleep disorders (sleep apnea). Talk to your health care provider about getting a sleep evaluation if you snore a lot or have excessive sleepiness.  · Take over-the-counter and prescription medicines only as told by your health care provider. Aspirin or blood thinners (antiplatelets or  anticoagulants) may be recommended to reduce your risk of forming blood clots that can lead to stroke.  · Make sure that any other medical conditions you have, such as atrial fibrillation or atherosclerosis, are managed.  What are the warning signs of a stroke?  The warning signs of a stroke can be easily remembered as BEFAST.  · B is for balance. Signs include:  ? Dizziness.  ? Loss of balance or coordination.  ? Sudden trouble walking.  · E is for eyes. Signs include:  ? A sudden change in vision.  ? Trouble seeing.  · F is for face. Signs include:  ? Sudden weakness or numbness of the face.  ? The face or eyelid drooping to one side.  · A is for arms. Signs include:  ? Sudden weakness or numbness of the arm, usually on one side of the body.  · S is for speech. Signs include:  ? Trouble speaking (aphasia).  ? Trouble understanding.  · T is for time.  ? These symptoms may represent a serious problem that is an emergency. Do not wait to see if the symptoms will go away. Get medical help right away. Call your local emergency services (911 in the [place] Do not drive yourself to the hospital.  · Other signs of stroke may include:  ? A sudden, severe headache with no known cause.  ? Nausea or vomiting.  ? Seizure.  Where to find more information  For more information, visit:  · American Stroke Association: www.strokeassociation.org  · National Stroke Association: www.stroke.org  Summary  · You can prevent a stroke by eating healthy, exercising, not smoking, limiting alcohol intake, and managing any medical conditions you may have.  · Do not use any products that contain nicotine or tobacco, such as cigarettes and e-cigarettes. If you need help quitting, ask your health care provider. It may also be helpful to avoid exposure to secondhand smoke.  · Remember BEFAST for warning signs of stroke. Get help right away if you or a loved one has any of these signs.  This information is not intended to replace advice given to you  by your health care provider. Make sure you discuss any questions you have with your health care provider.  Document Released: 01/25/2006 Document Revised: 11/30/2018 Document Reviewed: 01/23/2018  Elsevier Patient Education © 2020 [employer]

## 2020-08-28 ENCOUNTER — TRANSCRIBE ORDERS (OUTPATIENT)
Dept: ADMINISTRATIVE | Facility: HOSPITAL | Age: 85
End: 2020-08-28

## 2020-08-28 DIAGNOSIS — Z12.31 SCREENING MAMMOGRAM, ENCOUNTER FOR: Primary | ICD-10-CM

## 2020-09-01 ENCOUNTER — OFFICE (OUTPATIENT)
Dept: URBAN - METROPOLITAN AREA CLINIC 75 | Facility: CLINIC | Age: 85
End: 2020-09-01

## 2020-09-01 VITALS — HEIGHT: 65 IN | WEIGHT: 168 LBS

## 2020-09-01 DIAGNOSIS — K31.84 GASTROPARESIS: ICD-10-CM

## 2020-09-01 DIAGNOSIS — R93.3 ABNORMAL FINDINGS ON DIAGNOSTIC IMAGING OF OTHER PARTS OF DI: ICD-10-CM

## 2020-09-01 PROCEDURE — 99213 OFFICE O/P EST LOW 20 MIN: CPT | Performed by: INTERNAL MEDICINE

## 2020-09-11 ENCOUNTER — TRANSCRIBE ORDERS (OUTPATIENT)
Dept: ADMINISTRATIVE | Facility: HOSPITAL | Age: 85
End: 2020-09-11

## 2020-09-11 DIAGNOSIS — Z78.0 POST-MENOPAUSAL: Primary | ICD-10-CM

## 2020-11-19 ENCOUNTER — HOSPITAL ENCOUNTER (OUTPATIENT)
Dept: BONE DENSITY | Facility: HOSPITAL | Age: 85
Discharge: HOME | End: 2020-11-19

## 2020-11-19 ENCOUNTER — HOSPITAL ENCOUNTER (OUTPATIENT)
Dept: MAMMOGRAPHY | Facility: HOSPITAL | Age: 85
Discharge: HOME | End: 2020-11-19

## 2020-11-19 DIAGNOSIS — Z78.0 POST-MENOPAUSAL: ICD-10-CM

## 2020-11-19 DIAGNOSIS — Z12.31 SCREENING MAMMOGRAM, ENCOUNTER FOR: ICD-10-CM

## 2020-11-19 PROCEDURE — 77063 BREAST TOMOSYNTHESIS BI: CPT

## 2020-11-19 PROCEDURE — 77067 SCR MAMMO BI INCL CAD: CPT

## 2020-11-19 PROCEDURE — 77080 DXA BONE DENSITY AXIAL: CPT

## 2021-02-23 ENCOUNTER — APPOINTMENT (OUTPATIENT)
Dept: GENERAL RADIOLOGY | Facility: HOSPITAL | Age: 86
End: 2021-02-23

## 2021-02-23 ENCOUNTER — HOSPITAL ENCOUNTER (OUTPATIENT)
Facility: HOSPITAL | Age: 86
Setting detail: OBSERVATION
Discharge: HOME | End: 2021-02-26
Attending: EMERGENCY MEDICINE | Admitting: HOSPITALIST

## 2021-02-23 DIAGNOSIS — R07.9 CHEST PAIN IN ADULT: Primary | ICD-10-CM

## 2021-02-23 LAB
ALBUMIN SERPL-MCNC: 4.5 G/DL (ref 3.5–5.2)
ALBUMIN/GLOB SERPL: 2.4 G/DL
ALP SERPL-CCNC: 152 U/L (ref 39–117)
ALT SERPL W P-5'-P-CCNC: 50 U/L (ref 1–33)
ANION GAP SERPL CALCULATED.3IONS-SCNC: 10.7 MMOL/L (ref 5–15)
AST SERPL-CCNC: 48 U/L (ref 1–32)
BASOPHILS # BLD AUTO: 0.04 10*3/MM3 (ref 0–0.2)
BASOPHILS NFR BLD AUTO: 0.6 % (ref 0–1.5)
BILIRUB SERPL-MCNC: 0.7 MG/DL (ref 0–1.2)
BUN SERPL-MCNC: 8 MG/DL (ref 8–23)
BUN/CREAT SERPL: 16 (ref 7–25)
CALCIUM SPEC-SCNC: 9.9 MG/DL (ref 8.6–10.5)
CHLORIDE SERPL-SCNC: 102 MMOL/L (ref 98–107)
CO2 SERPL-SCNC: 24.3 MMOL/L (ref 22–29)
CREAT SERPL-MCNC: 0.5 MG/DL (ref 0.57–1)
DEPRECATED RDW RBC AUTO: 43.6 FL (ref 37–54)
EOSINOPHIL # BLD AUTO: 0.02 10*3/MM3 (ref 0–0.4)
EOSINOPHIL NFR BLD AUTO: 0.3 % (ref 0.3–6.2)
ERYTHROCYTE [DISTWIDTH] IN BLOOD BY AUTOMATED COUNT: 13 % (ref 12.3–15.4)
GFR SERPL CREATININE-BSD FRML MDRD: 118 ML/MIN/1.73
GLOBULIN UR ELPH-MCNC: 1.9 GM/DL
GLUCOSE SERPL-MCNC: 103 MG/DL (ref 65–99)
HCT VFR BLD AUTO: 41 % (ref 34–46.6)
HGB BLD-MCNC: 13.6 G/DL (ref 12–15.9)
IMM GRANULOCYTES # BLD AUTO: 0.03 10*3/MM3 (ref 0–0.05)
IMM GRANULOCYTES NFR BLD AUTO: 0.4 % (ref 0–0.5)
INR PPP: 0.98 (ref 0.9–1.1)
LYMPHOCYTES # BLD AUTO: 2.01 10*3/MM3 (ref 0.7–3.1)
LYMPHOCYTES NFR BLD AUTO: 28.1 % (ref 19.6–45.3)
MCH RBC QN AUTO: 30.3 PG (ref 26.6–33)
MCHC RBC AUTO-ENTMCNC: 33.2 G/DL (ref 31.5–35.7)
MCV RBC AUTO: 91.3 FL (ref 79–97)
MONOCYTES # BLD AUTO: 0.64 10*3/MM3 (ref 0.1–0.9)
MONOCYTES NFR BLD AUTO: 9 % (ref 5–12)
NEUTROPHILS NFR BLD AUTO: 4.41 10*3/MM3 (ref 1.7–7)
NEUTROPHILS NFR BLD AUTO: 61.6 % (ref 42.7–76)
NRBC BLD AUTO-RTO: 0 /100 WBC (ref 0–0.2)
PLATELET # BLD AUTO: 247 10*3/MM3 (ref 140–450)
PMV BLD AUTO: 9.6 FL (ref 6–12)
POTASSIUM SERPL-SCNC: 4.1 MMOL/L (ref 3.5–5.2)
PROT SERPL-MCNC: 6.4 G/DL (ref 6–8.5)
PROTHROMBIN TIME: 12.8 SECONDS (ref 11.7–14.2)
QT INTERVAL: 358 MS
RBC # BLD AUTO: 4.49 10*6/MM3 (ref 3.77–5.28)
SODIUM SERPL-SCNC: 137 MMOL/L (ref 136–145)
TROPONIN T SERPL-MCNC: <0.01 NG/ML (ref 0–0.03)
TROPONIN T SERPL-MCNC: <0.01 NG/ML (ref 0–0.03)
WBC # BLD AUTO: 7.15 10*3/MM3 (ref 3.4–10.8)

## 2021-02-23 PROCEDURE — 84484 ASSAY OF TROPONIN QUANT: CPT | Performed by: EMERGENCY MEDICINE

## 2021-02-23 PROCEDURE — 93010 ELECTROCARDIOGRAM REPORT: CPT | Performed by: INTERNAL MEDICINE

## 2021-02-23 PROCEDURE — G0378 HOSPITAL OBSERVATION PER HR: HCPCS

## 2021-02-23 PROCEDURE — 71045 X-RAY EXAM CHEST 1 VIEW: CPT

## 2021-02-23 PROCEDURE — 93005 ELECTROCARDIOGRAM TRACING: CPT

## 2021-02-23 PROCEDURE — 93005 ELECTROCARDIOGRAM TRACING: CPT | Performed by: EMERGENCY MEDICINE

## 2021-02-23 PROCEDURE — 99285 EMERGENCY DEPT VISIT HI MDM: CPT

## 2021-02-23 PROCEDURE — 85610 PROTHROMBIN TIME: CPT | Performed by: EMERGENCY MEDICINE

## 2021-02-23 PROCEDURE — 85025 COMPLETE CBC W/AUTO DIFF WBC: CPT | Performed by: EMERGENCY MEDICINE

## 2021-02-23 PROCEDURE — U0004 COV-19 TEST NON-CDC HGH THRU: HCPCS | Performed by: EMERGENCY MEDICINE

## 2021-02-23 PROCEDURE — C9803 HOPD COVID-19 SPEC COLLECT: HCPCS

## 2021-02-23 PROCEDURE — 80053 COMPREHEN METABOLIC PANEL: CPT | Performed by: EMERGENCY MEDICINE

## 2021-02-23 PROCEDURE — 96374 THER/PROPH/DIAG INJ IV PUSH: CPT

## 2021-02-23 RX ORDER — SODIUM CHLORIDE 0.9 % (FLUSH) 0.9 %
10 SYRINGE (ML) INJECTION AS NEEDED
Status: DISCONTINUED | OUTPATIENT
Start: 2021-02-23 | End: 2021-02-26 | Stop reason: HOSPADM

## 2021-02-23 RX ORDER — NITROGLYCERIN 0.4 MG/1
0.4 TABLET SUBLINGUAL
Status: DISCONTINUED | OUTPATIENT
Start: 2021-02-23 | End: 2021-02-26

## 2021-02-23 RX ORDER — LISINOPRIL 40 MG/1
40 TABLET ORAL EVERY 12 HOURS SCHEDULED
Status: DISCONTINUED | OUTPATIENT
Start: 2021-02-23 | End: 2021-02-26 | Stop reason: HOSPADM

## 2021-02-23 RX ORDER — ALUMINA, MAGNESIA, AND SIMETHICONE 2400; 2400; 240 MG/30ML; MG/30ML; MG/30ML
15 SUSPENSION ORAL ONCE
Status: COMPLETED | OUTPATIENT
Start: 2021-02-23 | End: 2021-02-23

## 2021-02-23 RX ORDER — LEVOCETIRIZINE DIHYDROCHLORIDE 5 MG/1
5 TABLET, FILM COATED ORAL EVERY EVENING
COMMUNITY
End: 2021-02-26 | Stop reason: HOSPADM

## 2021-02-23 RX ORDER — PANTOPRAZOLE SODIUM 40 MG/1
40 TABLET, DELAYED RELEASE ORAL
Status: DISCONTINUED | OUTPATIENT
Start: 2021-02-24 | End: 2021-02-24

## 2021-02-23 RX ORDER — ACETAMINOPHEN 500 MG
1000 TABLET ORAL ONCE
Status: COMPLETED | OUTPATIENT
Start: 2021-02-23 | End: 2021-02-23

## 2021-02-23 RX ORDER — FAMOTIDINE 10 MG/ML
20 INJECTION, SOLUTION INTRAVENOUS ONCE
Status: COMPLETED | OUTPATIENT
Start: 2021-02-23 | End: 2021-02-23

## 2021-02-23 RX ORDER — LIDOCAINE HYDROCHLORIDE 20 MG/ML
15 SOLUTION OROPHARYNGEAL ONCE
Status: COMPLETED | OUTPATIENT
Start: 2021-02-23 | End: 2021-02-23

## 2021-02-23 RX ORDER — ATORVASTATIN CALCIUM 20 MG/1
20 TABLET, FILM COATED ORAL DAILY
Status: DISCONTINUED | OUTPATIENT
Start: 2021-02-23 | End: 2021-02-24

## 2021-02-23 RX ORDER — ASPIRIN 81 MG/1
81 TABLET ORAL DAILY
Status: DISCONTINUED | OUTPATIENT
Start: 2021-02-24 | End: 2021-02-26 | Stop reason: HOSPADM

## 2021-02-23 RX ADMIN — NITROGLYCERIN 0.4 MG: 0.4 TABLET SUBLINGUAL at 18:46

## 2021-02-23 RX ADMIN — ATORVASTATIN CALCIUM 20 MG: 20 TABLET, FILM COATED ORAL at 23:47

## 2021-02-23 RX ADMIN — NITROGLYCERIN 1 INCH: 20 OINTMENT TOPICAL at 19:19

## 2021-02-23 RX ADMIN — MAGNESIUM HYDROXIDE,ALUMINUM HYDROXICE,SIMETHICONE 15 ML: 240; 2400; 2400 SUSPENSION ORAL at 18:10

## 2021-02-23 RX ADMIN — LISINOPRIL 40 MG: 40 TABLET ORAL at 23:47

## 2021-02-23 RX ADMIN — ACETAMINOPHEN 1000 MG: 500 TABLET ORAL at 18:08

## 2021-02-23 RX ADMIN — FAMOTIDINE 20 MG: 10 INJECTION INTRAVENOUS at 19:19

## 2021-02-23 RX ADMIN — LIDOCAINE HYDROCHLORIDE 15 ML: 20 SOLUTION ORAL; TOPICAL at 18:10

## 2021-02-24 LAB
ANION GAP SERPL CALCULATED.3IONS-SCNC: 7 MMOL/L (ref 5–15)
BASOPHILS # BLD AUTO: 0.02 10*3/MM3 (ref 0–0.2)
BASOPHILS NFR BLD AUTO: 0.4 % (ref 0–1.5)
BUN SERPL-MCNC: 7 MG/DL (ref 8–23)
BUN/CREAT SERPL: 15.6 (ref 7–25)
CALCIUM SPEC-SCNC: 9 MG/DL (ref 8.6–10.5)
CHLORIDE SERPL-SCNC: 104 MMOL/L (ref 98–107)
CO2 SERPL-SCNC: 26 MMOL/L (ref 22–29)
CREAT SERPL-MCNC: 0.45 MG/DL (ref 0.57–1)
DEPRECATED RDW RBC AUTO: 43 FL (ref 37–54)
EOSINOPHIL # BLD AUTO: 0.04 10*3/MM3 (ref 0–0.4)
EOSINOPHIL NFR BLD AUTO: 0.8 % (ref 0.3–6.2)
ERYTHROCYTE [DISTWIDTH] IN BLOOD BY AUTOMATED COUNT: 12.6 % (ref 12.3–15.4)
GFR SERPL CREATININE-BSD FRML MDRD: 133 ML/MIN/1.73
GLUCOSE SERPL-MCNC: 89 MG/DL (ref 65–99)
HCT VFR BLD AUTO: 36.9 % (ref 34–46.6)
HGB BLD-MCNC: 12.2 G/DL (ref 12–15.9)
IMM GRANULOCYTES # BLD AUTO: 0.01 10*3/MM3 (ref 0–0.05)
IMM GRANULOCYTES NFR BLD AUTO: 0.2 % (ref 0–0.5)
LYMPHOCYTES # BLD AUTO: 1.81 10*3/MM3 (ref 0.7–3.1)
LYMPHOCYTES NFR BLD AUTO: 35.4 % (ref 19.6–45.3)
MCH RBC QN AUTO: 31 PG (ref 26.6–33)
MCHC RBC AUTO-ENTMCNC: 33.1 G/DL (ref 31.5–35.7)
MCV RBC AUTO: 93.7 FL (ref 79–97)
MONOCYTES # BLD AUTO: 0.68 10*3/MM3 (ref 0.1–0.9)
MONOCYTES NFR BLD AUTO: 13.3 % (ref 5–12)
NEUTROPHILS NFR BLD AUTO: 2.56 10*3/MM3 (ref 1.7–7)
NEUTROPHILS NFR BLD AUTO: 49.9 % (ref 42.7–76)
NRBC BLD AUTO-RTO: 0 /100 WBC (ref 0–0.2)
PLATELET # BLD AUTO: 210 10*3/MM3 (ref 140–450)
PMV BLD AUTO: 9.7 FL (ref 6–12)
POTASSIUM SERPL-SCNC: 3.9 MMOL/L (ref 3.5–5.2)
QT INTERVAL: 388 MS
QT INTERVAL: 412 MS
RBC # BLD AUTO: 3.94 10*6/MM3 (ref 3.77–5.28)
SARS-COV-2 ORF1AB RESP QL NAA+PROBE: NOT DETECTED
SODIUM SERPL-SCNC: 137 MMOL/L (ref 136–145)
TROPONIN T SERPL-MCNC: <0.01 NG/ML (ref 0–0.03)
TROPONIN T SERPL-MCNC: <0.01 NG/ML (ref 0–0.03)
WBC # BLD AUTO: 5.12 10*3/MM3 (ref 3.4–10.8)

## 2021-02-24 PROCEDURE — 85025 COMPLETE CBC W/AUTO DIFF WBC: CPT | Performed by: HOSPITALIST

## 2021-02-24 PROCEDURE — G0378 HOSPITAL OBSERVATION PER HR: HCPCS

## 2021-02-24 PROCEDURE — 84484 ASSAY OF TROPONIN QUANT: CPT | Performed by: HOSPITALIST

## 2021-02-24 PROCEDURE — 80048 BASIC METABOLIC PNL TOTAL CA: CPT | Performed by: HOSPITALIST

## 2021-02-24 PROCEDURE — 99214 OFFICE O/P EST MOD 30 MIN: CPT | Performed by: INTERNAL MEDICINE

## 2021-02-24 PROCEDURE — 93010 ELECTROCARDIOGRAM REPORT: CPT | Performed by: INTERNAL MEDICINE

## 2021-02-24 PROCEDURE — 93005 ELECTROCARDIOGRAM TRACING: CPT | Performed by: HOSPITALIST

## 2021-02-24 RX ORDER — ATORVASTATIN CALCIUM 20 MG/1
10 TABLET, FILM COATED ORAL NIGHTLY
Status: DISCONTINUED | OUTPATIENT
Start: 2021-02-24 | End: 2021-02-26 | Stop reason: HOSPADM

## 2021-02-24 RX ORDER — ACETAMINOPHEN 500 MG
1000 TABLET ORAL ONCE
Status: DISCONTINUED | OUTPATIENT
Start: 2021-02-24 | End: 2021-02-24

## 2021-02-24 RX ORDER — FAMOTIDINE 10 MG/ML
20 INJECTION, SOLUTION INTRAVENOUS ONCE
Status: DISCONTINUED | OUTPATIENT
Start: 2021-02-24 | End: 2021-02-24

## 2021-02-24 RX ORDER — LIDOCAINE HYDROCHLORIDE 20 MG/ML
15 SOLUTION OROPHARYNGEAL ONCE
Status: DISCONTINUED | OUTPATIENT
Start: 2021-02-24 | End: 2021-02-24

## 2021-02-24 RX ORDER — PANTOPRAZOLE SODIUM 40 MG/1
40 TABLET, DELAYED RELEASE ORAL
Status: DISCONTINUED | OUTPATIENT
Start: 2021-02-24 | End: 2021-02-26 | Stop reason: HOSPADM

## 2021-02-24 RX ORDER — ALUMINA, MAGNESIA, AND SIMETHICONE 2400; 2400; 240 MG/30ML; MG/30ML; MG/30ML
15 SUSPENSION ORAL ONCE
Status: DISCONTINUED | OUTPATIENT
Start: 2021-02-24 | End: 2021-02-24

## 2021-02-24 RX ADMIN — NITROGLYCERIN 0.5 INCH: 20 OINTMENT TOPICAL at 19:08

## 2021-02-24 RX ADMIN — ATORVASTATIN CALCIUM 10 MG: 20 TABLET, FILM COATED ORAL at 20:40

## 2021-02-24 RX ADMIN — NITROGLYCERIN 0.5 INCH: 20 OINTMENT TOPICAL at 14:37

## 2021-02-24 RX ADMIN — ASPIRIN 81 MG: 81 TABLET, COATED ORAL at 08:26

## 2021-02-24 RX ADMIN — PANTOPRAZOLE SODIUM 40 MG: 40 TABLET, DELAYED RELEASE ORAL at 07:28

## 2021-02-24 RX ADMIN — LISINOPRIL 40 MG: 40 TABLET ORAL at 20:40

## 2021-02-24 RX ADMIN — LISINOPRIL 40 MG: 40 TABLET ORAL at 08:26

## 2021-02-24 RX ADMIN — PANTOPRAZOLE SODIUM 40 MG: 40 TABLET, DELAYED RELEASE ORAL at 16:27

## 2021-02-25 ENCOUNTER — APPOINTMENT (OUTPATIENT)
Dept: NUCLEAR MEDICINE | Facility: HOSPITAL | Age: 86
End: 2021-02-25

## 2021-02-25 ENCOUNTER — APPOINTMENT (OUTPATIENT)
Dept: CARDIOLOGY | Facility: HOSPITAL | Age: 86
End: 2021-02-25

## 2021-02-25 LAB
ALBUMIN SERPL-MCNC: 3.4 G/DL (ref 3.5–5.2)
ALBUMIN/GLOB SERPL: 1.8 G/DL
ALP SERPL-CCNC: 111 U/L (ref 39–117)
ALT SERPL W P-5'-P-CCNC: 43 U/L (ref 1–33)
ANION GAP SERPL CALCULATED.3IONS-SCNC: 8 MMOL/L (ref 5–15)
AORTIC DIMENSIONLESS INDEX: 0.6 (DI)
AST SERPL-CCNC: 38 U/L (ref 1–32)
BASOPHILS # BLD AUTO: 0.03 10*3/MM3 (ref 0–0.2)
BASOPHILS NFR BLD AUTO: 0.5 % (ref 0–1.5)
BH CV ECHO MEAS - AI DEC SLOPE: 202.5 CM/SEC^2
BH CV ECHO MEAS - AI END-D VEL: 404 CM/SEC
BH CV ECHO MEAS - AI MAX PG: 68.2 MMHG
BH CV ECHO MEAS - AI MAX VEL: 413 CM/SEC
BH CV ECHO MEAS - AI P1/2T: 597.4 MSEC
BH CV ECHO MEAS - AO MAX PG (FULL): 10.6 MMHG
BH CV ECHO MEAS - AO MAX PG: 15.1 MMHG
BH CV ECHO MEAS - AO MEAN PG (FULL): 6 MMHG
BH CV ECHO MEAS - AO MEAN PG: 8 MMHG
BH CV ECHO MEAS - AO ROOT AREA (BSA CORRECTED): 1.9
BH CV ECHO MEAS - AO ROOT AREA: 9.6 CM^2
BH CV ECHO MEAS - AO ROOT DIAM: 3.5 CM
BH CV ECHO MEAS - AO V2 MAX: 194 CM/SEC
BH CV ECHO MEAS - AO V2 MEAN: 137 CM/SEC
BH CV ECHO MEAS - AO V2 VTI: 44.5 CM
BH CV ECHO MEAS - AVA(I,A): 1.5 CM^2
BH CV ECHO MEAS - AVA(I,D): 1.5 CM^2
BH CV ECHO MEAS - AVA(V,A): 1.5 CM^2
BH CV ECHO MEAS - AVA(V,D): 1.5 CM^2
BH CV ECHO MEAS - BSA(HAYCOCK): 1.9 M^2
BH CV ECHO MEAS - BSA: 1.8 M^2
BH CV ECHO MEAS - BZI_BMI: 28.3 KILOGRAMS/M^2
BH CV ECHO MEAS - BZI_METRIC_HEIGHT: 165.1 CM
BH CV ECHO MEAS - BZI_METRIC_WEIGHT: 77.1 KG
BH CV ECHO MEAS - EDV(CUBED): 91.1 ML
BH CV ECHO MEAS - EDV(MOD-SP2): 57 ML
BH CV ECHO MEAS - EDV(MOD-SP4): 40 ML
BH CV ECHO MEAS - EDV(TEICH): 92.4 ML
BH CV ECHO MEAS - EF(CUBED): 70.4 %
BH CV ECHO MEAS - EF(MOD-BP): 63 %
BH CV ECHO MEAS - EF(MOD-SP2): 63.2 %
BH CV ECHO MEAS - EF(MOD-SP4): 62.5 %
BH CV ECHO MEAS - EF(TEICH): 62.1 %
BH CV ECHO MEAS - ESV(CUBED): 27 ML
BH CV ECHO MEAS - ESV(MOD-SP2): 21 ML
BH CV ECHO MEAS - ESV(MOD-SP4): 15 ML
BH CV ECHO MEAS - ESV(TEICH): 35 ML
BH CV ECHO MEAS - FS: 33.3 %
BH CV ECHO MEAS - IVS/LVPW: 1.3
BH CV ECHO MEAS - IVSD: 1 CM
BH CV ECHO MEAS - LAT PEAK E' VEL: 9 CM/SEC
BH CV ECHO MEAS - LV DIASTOLIC VOL/BSA (35-75): 21.7 ML/M^2
BH CV ECHO MEAS - LV MASS(C)D: 132.8 GRAMS
BH CV ECHO MEAS - LV MASS(C)DI: 71.9 GRAMS/M^2
BH CV ECHO MEAS - LV MAX PG: 4.5 MMHG
BH CV ECHO MEAS - LV MEAN PG: 2 MMHG
BH CV ECHO MEAS - LV SYSTOLIC VOL/BSA (12-30): 8.1 ML/M^2
BH CV ECHO MEAS - LV V1 MAX: 106 CM/SEC
BH CV ECHO MEAS - LV V1 MEAN: 66.3 CM/SEC
BH CV ECHO MEAS - LV V1 VTI: 23.3 CM
BH CV ECHO MEAS - LVIDD: 4.5 CM
BH CV ECHO MEAS - LVIDS: 3 CM
BH CV ECHO MEAS - LVLD AP2: 6.1 CM
BH CV ECHO MEAS - LVLD AP4: 7.3 CM
BH CV ECHO MEAS - LVLS AP2: 4.9 CM
BH CV ECHO MEAS - LVLS AP4: 5.8 CM
BH CV ECHO MEAS - LVOT AREA (M): 2.8 CM^2
BH CV ECHO MEAS - LVOT AREA: 2.8 CM^2
BH CV ECHO MEAS - LVOT DIAM: 1.9 CM
BH CV ECHO MEAS - LVPWD: 0.8 CM
BH CV ECHO MEAS - MED PEAK E' VEL: 5.9 CM/SEC
BH CV ECHO MEAS - MV A MAX VEL: 98.5 CM/SEC
BH CV ECHO MEAS - MV DEC SLOPE: 197 CM/SEC^2
BH CV ECHO MEAS - MV DEC TIME: 393 SEC
BH CV ECHO MEAS - MV E MAX VEL: 51.7 CM/SEC
BH CV ECHO MEAS - MV E/A: 0.52
BH CV ECHO MEAS - MV MAX PG: 6.6 MMHG
BH CV ECHO MEAS - MV MEAN PG: 1 MMHG
BH CV ECHO MEAS - MV P1/2T MAX VEL: 56.6 CM/SEC
BH CV ECHO MEAS - MV P1/2T: 84.2 MSEC
BH CV ECHO MEAS - MV V2 MAX: 128 CM/SEC
BH CV ECHO MEAS - MV V2 MEAN: 48.9 CM/SEC
BH CV ECHO MEAS - MV V2 VTI: 27.5 CM
BH CV ECHO MEAS - MVA P1/2T LCG: 3.9 CM^2
BH CV ECHO MEAS - MVA(P1/2T): 2.6 CM^2
BH CV ECHO MEAS - MVA(VTI): 2.4 CM^2
BH CV ECHO MEAS - PA ACC TIME: 0.1 SEC
BH CV ECHO MEAS - PA MAX PG (FULL): 0.83 MMHG
BH CV ECHO MEAS - PA MAX PG: 2.1 MMHG
BH CV ECHO MEAS - PA PR(ACCEL): 35.6 MMHG
BH CV ECHO MEAS - PA V2 MAX: 73.3 CM/SEC
BH CV ECHO MEAS - RAP SYSTOLE: 3 MMHG
BH CV ECHO MEAS - RV MAX PG: 1.3 MMHG
BH CV ECHO MEAS - RV MEAN PG: 1 MMHG
BH CV ECHO MEAS - RV V1 MAX: 57.4 CM/SEC
BH CV ECHO MEAS - RV V1 MEAN: 37.6 CM/SEC
BH CV ECHO MEAS - RV V1 VTI: 11.5 CM
BH CV ECHO MEAS - RVSP: 31 MMHG
BH CV ECHO MEAS - SI(AO): 231.9 ML/M^2
BH CV ECHO MEAS - SI(CUBED): 34.7 ML/M^2
BH CV ECHO MEAS - SI(LVOT): 35.8 ML/M^2
BH CV ECHO MEAS - SI(MOD-SP2): 19.5 ML/M^2
BH CV ECHO MEAS - SI(MOD-SP4): 13.5 ML/M^2
BH CV ECHO MEAS - SI(TEICH): 31.1 ML/M^2
BH CV ECHO MEAS - SV(AO): 428.1 ML
BH CV ECHO MEAS - SV(CUBED): 64.1 ML
BH CV ECHO MEAS - SV(LVOT): 66.1 ML
BH CV ECHO MEAS - SV(MOD-SP2): 36 ML
BH CV ECHO MEAS - SV(MOD-SP4): 25 ML
BH CV ECHO MEAS - SV(TEICH): 57.4 ML
BH CV ECHO MEAS - TAPSE (>1.6): 2.3 CM
BH CV ECHO MEAS - TR MAX VEL: 234 CM/SEC
BH CV ECHO MEASUREMENTS AVERAGE E/E' RATIO: 6.94
BH CV STRESS COMMENTS STAGE 1: NORMAL
BH CV STRESS DOSE REGADENOSON STAGE 1: 0.4
BH CV STRESS DURATION MIN STAGE 1: 0
BH CV STRESS DURATION SEC STAGE 1: 10
BH CV STRESS PROTOCOL 1: NORMAL
BH CV STRESS RECOVERY BP: NORMAL MMHG
BH CV STRESS RECOVERY HR: 102 BPM
BH CV STRESS STAGE 1: 1
BH CV XLRA - RV BASE: 3.1 CM
BH CV XLRA - RV LENGTH: 6.4 CM
BH CV XLRA - RV MID: 2.4 CM
BH CV XLRA - TDI S': 11.3 CM/SEC
BILIRUB SERPL-MCNC: 0.5 MG/DL (ref 0–1.2)
BUN SERPL-MCNC: 9 MG/DL (ref 8–23)
BUN/CREAT SERPL: 18.4 (ref 7–25)
CALCIUM SPEC-SCNC: 8.9 MG/DL (ref 8.6–10.5)
CHLORIDE SERPL-SCNC: 105 MMOL/L (ref 98–107)
CHOLEST SERPL-MCNC: 94 MG/DL (ref 0–200)
CO2 SERPL-SCNC: 25 MMOL/L (ref 22–29)
CREAT SERPL-MCNC: 0.49 MG/DL (ref 0.57–1)
DEPRECATED RDW RBC AUTO: 43.2 FL (ref 37–54)
EOSINOPHIL # BLD AUTO: 0.11 10*3/MM3 (ref 0–0.4)
EOSINOPHIL NFR BLD AUTO: 1.9 % (ref 0.3–6.2)
ERYTHROCYTE [DISTWIDTH] IN BLOOD BY AUTOMATED COUNT: 12.7 % (ref 12.3–15.4)
GFR SERPL CREATININE-BSD FRML MDRD: 121 ML/MIN/1.73
GLOBULIN UR ELPH-MCNC: 1.9 GM/DL
GLUCOSE SERPL-MCNC: 89 MG/DL (ref 65–99)
HBA1C MFR BLD: 5.41 % (ref 4.8–5.6)
HCT VFR BLD AUTO: 36.5 % (ref 34–46.6)
HDLC SERPL-MCNC: 61 MG/DL (ref 40–60)
HGB BLD-MCNC: 12.3 G/DL (ref 12–15.9)
IMM GRANULOCYTES # BLD AUTO: 0.02 10*3/MM3 (ref 0–0.05)
IMM GRANULOCYTES NFR BLD AUTO: 0.3 % (ref 0–0.5)
LDLC SERPL CALC-MCNC: 22 MG/DL (ref 0–100)
LDLC/HDLC SERPL: 0.42 {RATIO}
LEFT ATRIUM VOLUME INDEX: 19 ML/M2
LV EF 2D ECHO EST: 65 %
LV EF NUC BP: 75 %
LYMPHOCYTES # BLD AUTO: 2.05 10*3/MM3 (ref 0.7–3.1)
LYMPHOCYTES NFR BLD AUTO: 34.9 % (ref 19.6–45.3)
MAXIMAL PREDICTED HEART RATE: 137 BPM
MCH RBC QN AUTO: 31.1 PG (ref 26.6–33)
MCHC RBC AUTO-ENTMCNC: 33.7 G/DL (ref 31.5–35.7)
MCV RBC AUTO: 92.4 FL (ref 79–97)
MONOCYTES # BLD AUTO: 0.64 10*3/MM3 (ref 0.1–0.9)
MONOCYTES NFR BLD AUTO: 10.9 % (ref 5–12)
NEUTROPHILS NFR BLD AUTO: 3.02 10*3/MM3 (ref 1.7–7)
NEUTROPHILS NFR BLD AUTO: 51.5 % (ref 42.7–76)
NRBC BLD AUTO-RTO: 0 /100 WBC (ref 0–0.2)
NT-PROBNP SERPL-MCNC: 92.5 PG/ML (ref 0–1800)
PERCENT MAX PREDICTED HR: 79.56 %
PLATELET # BLD AUTO: 212 10*3/MM3 (ref 140–450)
PMV BLD AUTO: 10.1 FL (ref 6–12)
POTASSIUM SERPL-SCNC: 4.4 MMOL/L (ref 3.5–5.2)
PROT SERPL-MCNC: 5.3 G/DL (ref 6–8.5)
RBC # BLD AUTO: 3.95 10*6/MM3 (ref 3.77–5.28)
SODIUM SERPL-SCNC: 138 MMOL/L (ref 136–145)
STRESS BASELINE BP: NORMAL MMHG
STRESS BASELINE HR: 71 BPM
STRESS PERCENT HR: 94 %
STRESS POST PEAK BP: NORMAL MMHG
STRESS POST PEAK HR: 109 BPM
STRESS TARGET HR: 116 BPM
TRIGL SERPL-MCNC: 37 MG/DL (ref 0–150)
TSH SERPL DL<=0.05 MIU/L-ACNC: 1.22 UIU/ML (ref 0.27–4.2)
VLDLC SERPL-MCNC: 11 MG/DL (ref 5–40)
WBC # BLD AUTO: 5.87 10*3/MM3 (ref 3.4–10.8)

## 2021-02-25 PROCEDURE — 84443 ASSAY THYROID STIM HORMONE: CPT | Performed by: HOSPITALIST

## 2021-02-25 PROCEDURE — 93016 CV STRESS TEST SUPVJ ONLY: CPT | Performed by: INTERNAL MEDICINE

## 2021-02-25 PROCEDURE — 25010000002 REGADENOSON 0.4 MG/5ML SOLUTION: Performed by: HOSPITALIST

## 2021-02-25 PROCEDURE — 78452 HT MUSCLE IMAGE SPECT MULT: CPT | Performed by: INTERNAL MEDICINE

## 2021-02-25 PROCEDURE — G0378 HOSPITAL OBSERVATION PER HR: HCPCS

## 2021-02-25 PROCEDURE — 83036 HEMOGLOBIN GLYCOSYLATED A1C: CPT | Performed by: HOSPITALIST

## 2021-02-25 PROCEDURE — 80053 COMPREHEN METABOLIC PANEL: CPT | Performed by: HOSPITALIST

## 2021-02-25 PROCEDURE — 99213 OFFICE O/P EST LOW 20 MIN: CPT | Performed by: INTERNAL MEDICINE

## 2021-02-25 PROCEDURE — 93306 TTE W/DOPPLER COMPLETE: CPT

## 2021-02-25 PROCEDURE — 0 TECHNETIUM SESTAMIBI: Performed by: HOSPITALIST

## 2021-02-25 PROCEDURE — 85025 COMPLETE CBC W/AUTO DIFF WBC: CPT | Performed by: HOSPITALIST

## 2021-02-25 PROCEDURE — A9500 TC99M SESTAMIBI: HCPCS | Performed by: HOSPITALIST

## 2021-02-25 PROCEDURE — 93018 CV STRESS TEST I&R ONLY: CPT | Performed by: INTERNAL MEDICINE

## 2021-02-25 PROCEDURE — 93306 TTE W/DOPPLER COMPLETE: CPT | Performed by: INTERNAL MEDICINE

## 2021-02-25 PROCEDURE — 78452 HT MUSCLE IMAGE SPECT MULT: CPT

## 2021-02-25 PROCEDURE — 99204 OFFICE O/P NEW MOD 45 MIN: CPT | Performed by: INTERNAL MEDICINE

## 2021-02-25 PROCEDURE — 83880 ASSAY OF NATRIURETIC PEPTIDE: CPT | Performed by: HOSPITALIST

## 2021-02-25 PROCEDURE — 93017 CV STRESS TEST TRACING ONLY: CPT

## 2021-02-25 PROCEDURE — 80061 LIPID PANEL: CPT | Performed by: HOSPITALIST

## 2021-02-25 RX ADMIN — REGADENOSON 0.4 MG: 0.08 INJECTION, SOLUTION INTRAVENOUS at 09:45

## 2021-02-25 RX ADMIN — TECHNETIUM TC 99M SESTAMIBI 1 DOSE: 1 INJECTION INTRAVENOUS at 09:45

## 2021-02-25 RX ADMIN — PANTOPRAZOLE SODIUM 40 MG: 40 TABLET, DELAYED RELEASE ORAL at 16:03

## 2021-02-25 RX ADMIN — LISINOPRIL 40 MG: 40 TABLET ORAL at 20:15

## 2021-02-25 RX ADMIN — ATORVASTATIN CALCIUM 10 MG: 20 TABLET, FILM COATED ORAL at 20:15

## 2021-02-25 RX ADMIN — TECHNETIUM TC 99M SESTAMIBI 1 DOSE: 1 INJECTION INTRAVENOUS at 07:30

## 2021-02-26 VITALS
TEMPERATURE: 98 F | HEART RATE: 63 BPM | BODY MASS INDEX: 28.32 KG/M2 | DIASTOLIC BLOOD PRESSURE: 88 MMHG | OXYGEN SATURATION: 96 % | SYSTOLIC BLOOD PRESSURE: 151 MMHG | WEIGHT: 170 LBS | HEIGHT: 65 IN | RESPIRATION RATE: 18 BRPM

## 2021-02-26 LAB
ANION GAP SERPL CALCULATED.3IONS-SCNC: 8 MMOL/L (ref 5–15)
BASOPHILS # BLD AUTO: 0.02 10*3/MM3 (ref 0–0.2)
BASOPHILS NFR BLD AUTO: 0.3 % (ref 0–1.5)
BUN SERPL-MCNC: 10 MG/DL (ref 8–23)
BUN/CREAT SERPL: 20.4 (ref 7–25)
CALCIUM SPEC-SCNC: 8.7 MG/DL (ref 8.6–10.5)
CHLORIDE SERPL-SCNC: 104 MMOL/L (ref 98–107)
CO2 SERPL-SCNC: 24 MMOL/L (ref 22–29)
CREAT SERPL-MCNC: 0.49 MG/DL (ref 0.57–1)
DEPRECATED RDW RBC AUTO: 43.1 FL (ref 37–54)
EOSINOPHIL # BLD AUTO: 0.08 10*3/MM3 (ref 0–0.4)
EOSINOPHIL NFR BLD AUTO: 1.3 % (ref 0.3–6.2)
ERYTHROCYTE [DISTWIDTH] IN BLOOD BY AUTOMATED COUNT: 12.6 % (ref 12.3–15.4)
GFR SERPL CREATININE-BSD FRML MDRD: 121 ML/MIN/1.73
GLUCOSE SERPL-MCNC: 87 MG/DL (ref 65–99)
HCT VFR BLD AUTO: 36.4 % (ref 34–46.6)
HGB BLD-MCNC: 12.6 G/DL (ref 12–15.9)
IMM GRANULOCYTES # BLD AUTO: 0.02 10*3/MM3 (ref 0–0.05)
IMM GRANULOCYTES NFR BLD AUTO: 0.3 % (ref 0–0.5)
LYMPHOCYTES # BLD AUTO: 2.21 10*3/MM3 (ref 0.7–3.1)
LYMPHOCYTES NFR BLD AUTO: 37 % (ref 19.6–45.3)
MCH RBC QN AUTO: 32.1 PG (ref 26.6–33)
MCHC RBC AUTO-ENTMCNC: 34.6 G/DL (ref 31.5–35.7)
MCV RBC AUTO: 92.6 FL (ref 79–97)
MONOCYTES # BLD AUTO: 0.67 10*3/MM3 (ref 0.1–0.9)
MONOCYTES NFR BLD AUTO: 11.2 % (ref 5–12)
NEUTROPHILS NFR BLD AUTO: 2.97 10*3/MM3 (ref 1.7–7)
NEUTROPHILS NFR BLD AUTO: 49.9 % (ref 42.7–76)
NRBC BLD AUTO-RTO: 0 /100 WBC (ref 0–0.2)
PLATELET # BLD AUTO: 206 10*3/MM3 (ref 140–450)
PMV BLD AUTO: 9.8 FL (ref 6–12)
POTASSIUM SERPL-SCNC: 4 MMOL/L (ref 3.5–5.2)
RBC # BLD AUTO: 3.93 10*6/MM3 (ref 3.77–5.28)
SODIUM SERPL-SCNC: 136 MMOL/L (ref 136–145)
WBC # BLD AUTO: 5.97 10*3/MM3 (ref 3.4–10.8)

## 2021-02-26 PROCEDURE — 85025 COMPLETE CBC W/AUTO DIFF WBC: CPT | Performed by: HOSPITALIST

## 2021-02-26 PROCEDURE — 80048 BASIC METABOLIC PNL TOTAL CA: CPT | Performed by: HOSPITALIST

## 2021-02-26 PROCEDURE — G0378 HOSPITAL OBSERVATION PER HR: HCPCS

## 2021-02-26 RX ORDER — PANTOPRAZOLE SODIUM 40 MG/1
40 TABLET, DELAYED RELEASE ORAL
Qty: 60 TABLET | Refills: 0 | Status: SHIPPED | OUTPATIENT
Start: 2021-02-26 | End: 2021-03-28

## 2021-02-26 RX ORDER — ATORVASTATIN CALCIUM 10 MG/1
10 TABLET, FILM COATED ORAL NIGHTLY
Qty: 30 TABLET | Refills: 0 | Status: SHIPPED | OUTPATIENT
Start: 2021-02-26 | End: 2021-03-28

## 2021-02-26 RX ADMIN — ASPIRIN 81 MG: 81 TABLET, COATED ORAL at 07:50

## 2021-02-26 RX ADMIN — PANTOPRAZOLE SODIUM 40 MG: 40 TABLET, DELAYED RELEASE ORAL at 07:50

## 2021-02-26 RX ADMIN — LISINOPRIL 40 MG: 40 TABLET ORAL at 07:50

## 2021-03-01 NOTE — PROGRESS NOTES
Case Management Discharge Note      Final Note: home         Selected Continued Care - Discharged on 2/26/2021 Admission date: 2/23/2021 - Discharge disposition: Home or Self Care    Destination    No services have been selected for the patient.              Durable Medical Equipment    No services have been selected for the patient.              Dialysis/Infusion    No services have been selected for the patient.              Home Medical Care    No services have been selected for the patient.              Therapy    No services have been selected for the patient.              Community Resources    No services have been selected for the patient.                  Transportation Services  Private: Car    Final Discharge Disposition Code: 01 - home or self-care

## 2021-03-12 ENCOUNTER — TRANSCRIBE ORDERS (OUTPATIENT)
Dept: ADMINISTRATIVE | Facility: HOSPITAL | Age: 86
End: 2021-03-12

## 2021-03-12 DIAGNOSIS — N94.9 ADNEXAL CYST: Primary | ICD-10-CM

## 2021-03-24 ENCOUNTER — HOSPITAL ENCOUNTER (OUTPATIENT)
Dept: ULTRASOUND IMAGING | Facility: HOSPITAL | Age: 86
Discharge: HOME | End: 2021-03-24
Admitting: FAMILY MEDICINE

## 2021-03-24 DIAGNOSIS — N94.9 ADNEXAL CYST: ICD-10-CM

## 2021-03-24 PROCEDURE — 76857 US EXAM PELVIC LIMITED: CPT

## 2021-03-24 PROCEDURE — 76830 TRANSVAGINAL US NON-OB: CPT

## 2021-04-08 VITALS
WEIGHT: 174 LBS | HEIGHT: 65 IN | SYSTOLIC BLOOD PRESSURE: 140 MMHG | TEMPERATURE: 97.5 F | DIASTOLIC BLOOD PRESSURE: 80 MMHG

## 2021-04-09 ENCOUNTER — OFFICE (OUTPATIENT)
Dept: URBAN - METROPOLITAN AREA CLINIC 75 | Facility: CLINIC | Age: 86
End: 2021-04-09

## 2021-04-09 DIAGNOSIS — K30 FUNCTIONAL DYSPEPSIA: ICD-10-CM

## 2021-04-09 DIAGNOSIS — R07.89 OTHER CHEST PAIN: ICD-10-CM

## 2021-04-09 DIAGNOSIS — R14.0 ABDOMINAL DISTENSION (GASEOUS): ICD-10-CM

## 2021-04-09 DIAGNOSIS — K31.84 GASTROPARESIS: ICD-10-CM

## 2021-04-09 PROCEDURE — 99214 OFFICE O/P EST MOD 30 MIN: CPT | Performed by: INTERNAL MEDICINE

## 2021-04-27 ENCOUNTER — OFFICE (OUTPATIENT)
Dept: URBAN - METROPOLITAN AREA CLINIC 75 | Facility: CLINIC | Age: 86
End: 2021-04-27

## 2021-04-27 VITALS
RESPIRATION RATE: 16 BRPM | TEMPERATURE: 97.2 F | DIASTOLIC BLOOD PRESSURE: 84 MMHG | SYSTOLIC BLOOD PRESSURE: 128 MMHG | HEART RATE: 80 BPM | HEIGHT: 65 IN | OXYGEN SATURATION: 99 % | WEIGHT: 175 LBS

## 2021-04-27 DIAGNOSIS — K29.70 GASTRITIS, UNSPECIFIED, WITHOUT BLEEDING: ICD-10-CM

## 2021-04-27 DIAGNOSIS — R14.0 ABDOMINAL DISTENSION (GASEOUS): ICD-10-CM

## 2021-04-27 DIAGNOSIS — K31.84 GASTROPARESIS: ICD-10-CM

## 2021-04-27 PROCEDURE — 99214 OFFICE O/P EST MOD 30 MIN: CPT

## 2021-06-07 ENCOUNTER — OFFICE (OUTPATIENT)
Dept: URBAN - METROPOLITAN AREA CLINIC 75 | Facility: CLINIC | Age: 86
End: 2021-06-07

## 2021-06-07 VITALS
SYSTOLIC BLOOD PRESSURE: 130 MMHG | HEIGHT: 65 IN | DIASTOLIC BLOOD PRESSURE: 82 MMHG | WEIGHT: 173 LBS | RESPIRATION RATE: 16 BRPM

## 2021-06-07 DIAGNOSIS — K30 FUNCTIONAL DYSPEPSIA: ICD-10-CM

## 2021-06-07 DIAGNOSIS — K59.00 CONSTIPATION, UNSPECIFIED: ICD-10-CM

## 2021-06-07 DIAGNOSIS — K31.84 GASTROPARESIS: ICD-10-CM

## 2021-06-07 PROCEDURE — 99213 OFFICE O/P EST LOW 20 MIN: CPT

## 2021-06-07 RX ORDER — FAMOTIDINE 20 MG/1
40 TABLET, FILM COATED ORAL
Qty: 180 | Refills: 1 | Status: COMPLETED
End: 2022-03-07

## 2021-09-21 ENCOUNTER — TRANSCRIBE ORDERS (OUTPATIENT)
Dept: ADMINISTRATIVE | Facility: HOSPITAL | Age: 86
End: 2021-09-21

## 2021-09-21 DIAGNOSIS — Z12.31 SCREENING MAMMOGRAM, ENCOUNTER FOR: Primary | ICD-10-CM

## 2021-11-17 ENCOUNTER — OFFICE VISIT (OUTPATIENT)
Dept: NEUROLOGY | Facility: CLINIC | Age: 86
End: 2021-11-17

## 2021-11-17 VITALS
DIASTOLIC BLOOD PRESSURE: 70 MMHG | OXYGEN SATURATION: 98 % | WEIGHT: 170 LBS | HEART RATE: 97 BPM | SYSTOLIC BLOOD PRESSURE: 126 MMHG | HEIGHT: 65 IN | BODY MASS INDEX: 28.32 KG/M2 | RESPIRATION RATE: 16 BRPM

## 2021-11-17 DIAGNOSIS — E78.5 HYPERLIPIDEMIA LDL GOAL <70: ICD-10-CM

## 2021-11-17 DIAGNOSIS — R20.2 PARESTHESIAS: ICD-10-CM

## 2021-11-17 DIAGNOSIS — I67.9 CEREBROVASCULAR DISEASE: Primary | ICD-10-CM

## 2021-11-17 DIAGNOSIS — I10 ESSENTIAL HYPERTENSION: ICD-10-CM

## 2021-11-17 PROCEDURE — 99214 OFFICE O/P EST MOD 30 MIN: CPT | Performed by: NURSE PRACTITIONER

## 2021-11-17 RX ORDER — ATORVASTATIN CALCIUM 40 MG/1
20 TABLET, FILM COATED ORAL NIGHTLY
COMMUNITY

## 2021-11-17 RX ORDER — ACETAMINOPHEN 500 MG
500 TABLET ORAL AS NEEDED
COMMUNITY

## 2021-11-17 NOTE — PROGRESS NOTES
"DOS: 2021  NAME: Katja Klein   : 1935  PCP: Opal Marina MD    Chief Complaint   Patient presents with   • Cerebrovascular Accident      SUBJECTIVE  Neurological Problem:  86 y.o.  RHW female with HTN, osteoarthritis and h/o b/l CTS who presents today for follow-up of stroke. She is unaccompanied.    Interval History:   **For previous detailed history, see progress note dated 2020.    Ms. Klein has a h/o small left hemispheric stroke she suffered in 2018, unclear etiology; however, likely due to atherosclerotic disease as her cardiac evaluation was unrevealing.  She was last seen on 2020, at that time maintained on ASA and Lipitor with no recurrent or new stroke/TIA symptoms and a normal neurologic exam. She did have complaints of intermittent neck pain, primarily on the left, no radicular symptoms.  We discussed management options including repeating MRI C-spine, pain medications and or physical therapy.  Given the fact that her exam is stable from previous, repeating MRI C-spine was not thought likely .  I recommended physical therapy; however, she indicated that symptoms seem to be improving and therefore would like to hold on this.      She presents today and continues on ASA, and 1/2 40 mg tablet of Lipitor. She is tolerating mediations well. She also istaking hemp oil that she states it is making her feel better, but she has difficulty articulating how. And taking neurivia for the last several weeks, again she thinks it is helping her but is not have any concrete examples at this point. She denies any new stroke/TIa symptoms. She was admitted to the hospital back in 2021 for possible MI, work-up negative.  She reports having an episode of \"a pop and shakes your whole body\", lasts only a second, no asociated neurologic symptoms, no residual symptoms. No bladder or bowel incontinence. This is only occurred once or twice, she describes as an " internal feeling, not an outward shake. She denies any LOC, urine incontinence or post episode lethargy. No real pain involved. Not having significant problems with her neck currently. She otherwise denies any changes in her health since her last visit. She continues to drive, no MVAs, no tickets. Is independent of all ADLs, manages her finances. Review of labs from February show a CMP with a creatinine of 0.49, otherwise unremarkable; A1c 5.41, TSH 1.22; lipid panel with a total of 94, HDL 61, LDL 22, TG 37, CBC WNL. She denies any signs or symptoms of sleep apnea. No formal testing. She denies smoking. Rare alcohol use, no falls. Does not use any assistive device.    Review of Systems:Review of Systems   Constitutional: Negative for activity change, appetite change and fatigue.   HENT: Negative for ear pain, tinnitus and trouble swallowing.    Eyes: Negative for photophobia, pain and visual disturbance.   Musculoskeletal: Positive for back pain and neck pain. Negative for gait problem.   Neurological: Negative for dizziness, tremors, seizures, syncope, facial asymmetry, speech difficulty, weakness, light-headedness, numbness and headaches.   Psychiatric/Behavioral: Negative for agitation, behavioral problems, confusion, decreased concentration, dysphoric mood, hallucinations, self-injury, sleep disturbance and suicidal ideas. The patient is nervous/anxious. The patient is not hyperactive.     Above ROS reviewed    The following portions of the patient's history were reviewed and updated as appropriate: allergies, current medications, past family history, past medical history, past social history, past surgical history and problem list.    Current Medications:   Current Outpatient Medications:   •  acetaminophen (TYLENOL) 500 MG tablet, Take 500 mg by mouth As Needed., Disp: , Rfl:   •  amLODIPine (NORVASC) 2.5 MG tablet, TK 1 T PO ONCE D, Disp: , Rfl:   •  aspirin 81 MG tablet, Take 81 mg by mouth Daily., Disp: ,  Rfl:   •  atorvastatin (LIPITOR) 40 MG tablet, Take 40 mg by mouth. Take 1 tab two twice daily., Disp: , Rfl:   •  fluticasone (FLONASE) 50 MCG/ACT nasal spray, 2 sprays into the nostril(s) as directed by provider Daily As Needed for Rhinitis., Disp: , Rfl:   •  lisinopril (PRINIVIL,ZESTRIL) 40 MG tablet, Take 40 mg by mouth 2 (Two) Times a Day., Disp: , Rfl:   •  Polyethylene Glycol 3350 (MIRALAX PO), Take  by mouth., Disp: , Rfl:   •  Unable to find, Take  by mouth 1 (One) Time. Domperidone-10mg      1 and a half hours before meals., Disp: , Rfl:   •  dicyclomine (BENTYL) 10 MG capsule, TK 1 C PO Q 6 H PRF CRAMPING, Disp: , Rfl:   **I did not stop or change the above medications.  Patient's medication list was updated to reflect medications they have reported as currently taking, including medication changes made by other providers.    OBJECTIVE  Vitals:    11/17/21 1048   BP: 126/70   Pulse: 97   Resp: 16   SpO2: 98%     Body mass index is 28.29 kg/m².    Diagnostics:    Laboratory Results:         Lab Results   Component Value Date    WBC 5.97 02/26/2021    HGB 12.6 02/26/2021    HCT 36.4 02/26/2021    MCV 92.6 02/26/2021     02/26/2021     Lab Results   Component Value Date    GLUCOSE 87 02/26/2021    BUN 10 02/26/2021    CREATININE 0.49 (L) 02/26/2021    EGFRIFNONA 121 02/26/2021    BCR 20.4 02/26/2021    K 4.0 02/26/2021    CO2 24.0 02/26/2021    CALCIUM 8.7 02/26/2021    ALBUMIN 3.40 (L) 02/25/2021    AST 38 (H) 02/25/2021    ALT 43 (H) 02/25/2021     Lab Results   Component Value Date    HGBA1C 5.41 02/25/2021     Lab Results   Component Value Date    CHOL 94 02/25/2021    CHOL 155 08/22/2018     Lab Results   Component Value Date    HDL 61 (H) 02/25/2021    HDL 68 (H) 08/22/2018     Lab Results   Component Value Date    LDL 22 02/25/2021    LDL 73 08/22/2018     Lab Results   Component Value Date    TRIG 37 02/25/2021    TRIG 68 08/22/2018     No results found for: RPR  Lab Results   Component Value  Date    TSH 1.220 02/25/2021     Lab Results   Component Value Date    [id number] 160 (L) 08/22/2018       Physical Exam:  GENERAL: NAD  HEENT: Normocephalic, normal neck ROM, no tenderness to palpation or with movement  COR: RRR  Resp: Even and unlabored  Extremities: No signs of distal embolization.   Skin: No rashes, lesions or ulcers.  Psychiatric: Normal mood and affect.    Neurological:   MS: AO. Language normal. No neglect. Recall (3/3). Follows all commands.  CN: II-XII grossly normal  Motor: Normal strength and tone throughout.  Sensory: Intact to light touch in arms and legs, creased sensation to vibratory sense below the ankles bilaterally.  Station and Gait: Normal gait and station.    Coordination: Normal finger to nose bilaterally    Impression/Plan:  1. Left hemispheric stroke -- likely d/t atherosclerotic disease  Continue daily ASA  Continue Lipitor 20 mg, can consider reducing further if LDL continues to be below 40  Follow-up with PCP for continued cholesterol surveillance  Monitor BP regularly  Keep well-hydrated  Consider evaluation for sleep apnea if indicated  Secondary stroke prevention: Ideal targets for stroke prevention would be Blood pressure < 130/80; B12 > 500 TSH in normal range and LDL < 70; HbA1c < 6.5 and smoking cessation if applicable.  Call 911 for stroke symptoms    2. Brief episode of head sensation --somewhat vague description, not related to pain, no associated neurologic deficits, uncertain etiology. Possibly some type of intermittent paresthesia. Patient will continue to monitor. Recommend rechecking B12 if this has not been done since 2018 when it was 160. Will check with PCP.     Follow-up in 1 year, sooner if symptoms warrant    I spent a total of 30 minutes today in reviewing records, prior diagnostics, examination of patient including, counseling and educating patient regarding signs/symptoms of stroke and reviewing diagnostics, stroke prevention recommendations and  discussion and documenting plan of care.        Diagnoses and all orders for this visit:    1. Cerebrovascular disease (Primary)    2. Essential hypertension    3. Hyperlipidemia LDL goal <70    4. Paresthesias        Coding      Dictated using Dragon

## 2021-11-22 ENCOUNTER — TELEPHONE (OUTPATIENT)
Dept: NEUROLOGY | Facility: CLINIC | Age: 86
End: 2021-11-22

## 2021-11-22 DIAGNOSIS — R20.2 PARESTHESIAS: Primary | ICD-10-CM

## 2021-11-22 NOTE — TELEPHONE ENCOUNTER
----- Message from NAHID Rosenberg sent at 11/19/2021  5:38 PM EST -----  Can you get most recent labwork... I am looking for a B12 level, last one in epic is from 2018, if she has not had one done since then, she will need a recheck. Thanks.

## 2021-11-22 NOTE — TELEPHONE ENCOUNTER
Receieved results for patients labs. There is no B-12 that was drawn. Called to let pt know she needed to get these labs done and she stated she gets them done every 3 months, but doesn't know who puts in the orders. Tried to call back PCP office and got voicemail. Will try to call again tomorrow.

## 2021-12-07 ENCOUNTER — OFFICE (OUTPATIENT)
Dept: URBAN - METROPOLITAN AREA CLINIC 75 | Facility: CLINIC | Age: 86
End: 2021-12-07

## 2021-12-07 VITALS
WEIGHT: 170 LBS | SYSTOLIC BLOOD PRESSURE: 104 MMHG | OXYGEN SATURATION: 99 % | HEIGHT: 65 IN | HEART RATE: 76 BPM | DIASTOLIC BLOOD PRESSURE: 80 MMHG

## 2021-12-07 DIAGNOSIS — K31.84 GASTROPARESIS: ICD-10-CM

## 2021-12-07 PROCEDURE — 99214 OFFICE O/P EST MOD 30 MIN: CPT | Performed by: INTERNAL MEDICINE

## 2022-01-03 ENCOUNTER — HOSPITAL ENCOUNTER (OUTPATIENT)
Dept: MAMMOGRAPHY | Facility: HOSPITAL | Age: 87
Discharge: HOME | End: 2022-01-03
Admitting: FAMILY MEDICINE

## 2022-01-03 DIAGNOSIS — Z12.31 SCREENING MAMMOGRAM, ENCOUNTER FOR: ICD-10-CM

## 2022-01-03 PROCEDURE — 77063 BREAST TOMOSYNTHESIS BI: CPT

## 2022-01-03 PROCEDURE — 77067 SCR MAMMO BI INCL CAD: CPT

## 2022-03-07 ENCOUNTER — OFFICE (OUTPATIENT)
Dept: URBAN - METROPOLITAN AREA CLINIC 75 | Facility: CLINIC | Age: 87
End: 2022-03-07

## 2022-03-07 VITALS
DIASTOLIC BLOOD PRESSURE: 84 MMHG | HEART RATE: 79 BPM | TEMPERATURE: 99 F | SYSTOLIC BLOOD PRESSURE: 140 MMHG | WEIGHT: 172 LBS | HEIGHT: 65 IN

## 2022-03-07 DIAGNOSIS — K21.9 GASTRO-ESOPHAGEAL REFLUX DISEASE WITHOUT ESOPHAGITIS: ICD-10-CM

## 2022-03-07 DIAGNOSIS — K59.00 CONSTIPATION, UNSPECIFIED: ICD-10-CM

## 2022-03-07 DIAGNOSIS — K30 FUNCTIONAL DYSPEPSIA: ICD-10-CM

## 2022-03-07 DIAGNOSIS — K31.84 GASTROPARESIS: ICD-10-CM

## 2022-03-07 PROCEDURE — 99214 OFFICE O/P EST MOD 30 MIN: CPT | Performed by: NURSE PRACTITIONER

## 2022-03-07 RX ORDER — PANTOPRAZOLE SODIUM 40 MG/1
TABLET, DELAYED RELEASE ORAL
Qty: 90 | Refills: 3 | Status: COMPLETED
Start: 2022-03-07 | End: 2022-06-28

## 2022-03-07 RX ORDER — FAMOTIDINE 20 MG/1
40 TABLET, FILM COATED ORAL
Qty: 180 | Refills: 1 | Status: COMPLETED
End: 2022-03-07

## 2022-06-28 ENCOUNTER — OFFICE (OUTPATIENT)
Dept: URBAN - METROPOLITAN AREA CLINIC 75 | Facility: CLINIC | Age: 87
End: 2022-06-28

## 2022-06-28 VITALS
OXYGEN SATURATION: 98 % | WEIGHT: 172 LBS | HEART RATE: 61 BPM | SYSTOLIC BLOOD PRESSURE: 120 MMHG | HEIGHT: 65 IN | DIASTOLIC BLOOD PRESSURE: 72 MMHG

## 2022-06-28 DIAGNOSIS — K31.84 GASTROPARESIS: ICD-10-CM

## 2022-06-28 PROCEDURE — 99214 OFFICE O/P EST MOD 30 MIN: CPT | Performed by: INTERNAL MEDICINE

## 2022-06-28 RX ORDER — FAMOTIDINE 20 MG/1
40 TABLET, FILM COATED ORAL
Qty: 60 | Refills: 0 | Status: ACTIVE
Start: 2022-04-22

## 2022-10-05 ENCOUNTER — TRANSCRIBE ORDERS (OUTPATIENT)
Dept: ADMINISTRATIVE | Facility: HOSPITAL | Age: 87
End: 2022-10-05

## 2022-10-05 DIAGNOSIS — Z12.31 SCREENING MAMMOGRAM, ENCOUNTER FOR: Primary | ICD-10-CM

## 2022-10-10 ENCOUNTER — TRANSCRIBE ORDERS (OUTPATIENT)
Dept: ADMINISTRATIVE | Facility: HOSPITAL | Age: 87
End: 2022-10-10

## 2022-10-10 DIAGNOSIS — Z78.0 POSTMENOPAUSAL: Primary | ICD-10-CM

## 2022-11-16 ENCOUNTER — OFFICE VISIT (OUTPATIENT)
Dept: NEUROLOGY | Facility: CLINIC | Age: 87
End: 2022-11-16

## 2022-11-16 VITALS
DIASTOLIC BLOOD PRESSURE: 82 MMHG | OXYGEN SATURATION: 97 % | HEIGHT: 65 IN | SYSTOLIC BLOOD PRESSURE: 120 MMHG | HEART RATE: 86 BPM | WEIGHT: 169 LBS | BODY MASS INDEX: 28.16 KG/M2

## 2022-11-16 DIAGNOSIS — I10 ESSENTIAL HYPERTENSION: ICD-10-CM

## 2022-11-16 DIAGNOSIS — I67.9 CEREBROVASCULAR DISEASE: Primary | ICD-10-CM

## 2022-11-16 DIAGNOSIS — E78.5 HYPERLIPIDEMIA LDL GOAL <70: ICD-10-CM

## 2022-11-16 DIAGNOSIS — R26.89 BALANCE DISORDER: ICD-10-CM

## 2022-11-16 PROCEDURE — 99214 OFFICE O/P EST MOD 30 MIN: CPT | Performed by: NURSE PRACTITIONER

## 2022-11-16 RX ORDER — FAMOTIDINE 20 MG/1
20 TABLET, FILM COATED ORAL 2 TIMES DAILY
COMMUNITY

## 2022-11-16 NOTE — PROGRESS NOTES
"DOS: 2022  NAME: Katja Klein   : 1935  PCP: pOal Marina MD    Chief Complaint   Patient presents with   • Stroke      SUBJECTIVE  Neurological Problem:  87 y.o. RHW female with HTN, osteoarthritis and h/o b/l CTS who presents today for follow-up of stroke. She is unaccompanied.    Interval History:   **For previous detailed history, see progress note dated 2020.    Ms. Klein has a h/o small left hemispheric stroke she suffered in 2018, unclear etiology; however, likely due to atherosclerotic disease as her cardiac evaluation was unrevealing.  She has been maintained on ASA and Lipitor with no recurrent or new stroke/TIA symptoms. She did have complaints of intermittent neck pain, primarily on the left, no radicular symptoms.  We have previously discussed management options including repeating MRI C-spine, pain medications and or physical therapy.  Given the fact that her exam is stable from previous, repeating MRI C-spine was not thought likely .  I recommended physical therapy; however, she indicated that symptoms seem to be improving and therefore would like to hold on this.  She was a seen by me in 2021.    She presents today and continues on ASA and Lipitor 40 mg daily. She denies any new stroke/TIA symptoms; however she does have a few \"minor\" complaints today including some worsening visual acuity, does have new glasses, will sometimes have floaters. Worsening balance but she also has knee issues, getting steroid injections in the left knee, she has declined PT.  She did have a slight fall backwards while at [employer] last week while trying to hold the door for someone, lost her balance, she did not fall to the ground, nor hit head, no LOC.  She did have some lower leg pain last month, there were concerns about a possible DVT, but her ultrasound was negative.  She denies any other changes in her health since her last visit.  She does not use any " assistive device.  She denies any signs or symptoms of sleep apnea. No formal testing. She denies smoking. Rare alcohol use, no falls. Does not use any assistive device.      Review of Systems:Review of Systems   Respiratory: Negative for cough, shortness of breath and wheezing.    Cardiovascular: Negative for chest pain, palpitations and leg swelling.   Endocrine: Negative for cold intolerance and heat intolerance.   Genitourinary: Negative for decreased urine volume, difficulty urinating and urgency.   Musculoskeletal: Negative for back pain, neck pain and neck stiffness.   Skin: Negative for color change, rash and wound.   Allergic/Immunologic: Negative for environmental allergies, food allergies and immunocompromised state.   Neurological: Negative for dizziness, tremors, seizures, syncope, facial asymmetry, speech difficulty, weakness, light-headedness, numbness and headaches.   Hematological: Negative for adenopathy. Does not bruise/bleed easily.   Psychiatric/Behavioral: Negative for confusion, decreased concentration and sleep disturbance. The patient is not nervous/anxious.     Above ROS reviewed    The following portions of the patient's history were reviewed and updated as appropriate: allergies, current medications, past family history, past medical history, past social history, past surgical history and problem list.    Current Medications:   Current Outpatient Medications:   •  amLODIPine (NORVASC) 5 MG tablet, Take 5 mg by mouth Daily., Disp: , Rfl:   •  aspirin 81 MG tablet, Take 81 mg by mouth Daily., Disp: , Rfl:   •  atorvastatin (LIPITOR) 40 MG tablet, Take 20 mg by mouth Every Night. Take 1 tab two twice daily., Disp: , Rfl:   •  famotidine (PEPCID) 20 MG tablet, Take 20 mg by mouth 2 (Two) Times a Day., Disp: , Rfl:   •  lisinopril (PRINIVIL,ZESTRIL) 40 MG tablet, Take 40 mg by mouth 2 (Two) Times a Day., Disp: , Rfl:   •  acetaminophen (TYLENOL) 500 MG tablet, Take 500 mg by mouth As Needed.,  Disp: , Rfl:   •  dicyclomine (BENTYL) 10 MG capsule, TK 1 C PO Q 6 H PRF CRAMPING, Disp: , Rfl:   •  fluticasone (FLONASE) 50 MCG/ACT nasal spray, 2 sprays into the nostril(s) as directed by provider Daily As Needed for Rhinitis., Disp: , Rfl:   •  Polyethylene Glycol 3350 (MIRALAX PO), Take  by mouth., Disp: , Rfl:   •  Unable to find, Take  by mouth 1 (One) Time. Domperidone-10mg      1 and a half hours before meals., Disp: , Rfl:   **I did not stop or change the above medications.  Patient's medication list was updated to reflect medications they have reported as currently taking, including medication changes made by other providers.    OBJECTIVE  Vitals:    11/16/22 1026   BP: 120/82   Pulse: 86   SpO2: 97%     Body mass index is 28.12 kg/m².    Diagnostics:    Laboratory Results:         Lab Results   Component Value Date    WBC 5.97 02/26/2021    HGB 12.6 02/26/2021    HCT 36.4 02/26/2021    MCV 92.6 02/26/2021     02/26/2021     Lab Results   Component Value Date    GLUCOSE 87 02/26/2021    BUN 10 02/26/2021    CREATININE 0.49 (L) 02/26/2021    EGFRIFNONA 121 02/26/2021    BCR 20.4 02/26/2021    K 4.0 02/26/2021    CO2 24.0 02/26/2021    CALCIUM 8.7 02/26/2021    ALBUMIN 3.40 (L) 02/25/2021    AST 38 (H) 02/25/2021    ALT 43 (H) 02/25/2021     Lab Results   Component Value Date    HGBA1C 5.41 02/25/2021     Lab Results   Component Value Date    CHOL 94 02/25/2021    CHOL 155 08/22/2018     Lab Results   Component Value Date    HDL 61 (H) 02/25/2021    HDL 68 (H) 08/22/2018     Lab Results   Component Value Date    LDL 22 02/25/2021    LDL 73 08/22/2018     Lab Results   Component Value Date    TRIG 37 02/25/2021    TRIG 68 08/22/2018     No results found for: RPR  Lab Results   Component Value Date    TSH 1.220 02/25/2021     Lab Results   Component Value Date    [id number] 160 (L) 08/22/2018       Physical Exam:  GENERAL: NAD  HEENT: Normocephalic, atraumatic   COR: RRR  Resp: Even and  unlabored  Extremities: No signs of distal embolization.   Skin: No rashes, lesions or ulcers.  Psychiatric: Normal mood and affect.    Neurological:   MS: AO. Language normal. No neglect. Follows all commands.  CN: II-XII grossly normal  Motor: Normal strength and tone throughout.  Sensory: Intact to light touch in arms and legs  Station and Gait: Normal gait and station.  Not using any assistive device.   Coordination: Normal finger to nose bilaterally    Impression/Plan:  1.  Cerebrovascular disease --history of left hemispheric stroke  Continue ASA daily  HLD: No recent lab work available, continue Lipitor, follow-up with PCP for regular surveillance, goal LDL less than 70  HTN: BP well controlled on today's vitals, continue to monitor  Low B12: Previously low B12 in 2018, again no recent labs, have advised patient to follow-up with PCP for surveillance, recommended goal B12 of greater than 500  Continue regular physical activity, healthy diet  Secondary stroke prevention: Ideal targets for stroke prevention would be Blood pressure < 130/80; B12 > 500 TSH in normal range and LDL < 70; HbA1c < 6.5 and smoking cessation if applicable.  Call 911 for stroke symptoms    2.  Balance issues --patient actually doing very well on exam today, did offer referral to PT for gait and balance training, she has declined at this time.  Recommend regular physical activity, even chair exercises given her chronic osteoarthritis of the knees.  Falls prevention discussed.    Patient will follow-up here as needed, she will call with any questions or concerns.    I spent a total of 30 minutes today in reviewing records, prior diagnostics, examination of patient including, counseling and educating patient regarding signs/symptoms of stroke and reviewing diagnostics, stroke prevention recommendations and discussion and documenting plan of care.      Diagnoses and all orders for this visit:    1. Cerebrovascular disease (Primary)    2.  Essential hypertension    3. Hyperlipidemia LDL goal <70    4. Balance disorder        Coding      Dictated using Dragon

## 2023-01-06 ENCOUNTER — HOSPITAL ENCOUNTER (OUTPATIENT)
Dept: MAMMOGRAPHY | Facility: HOSPITAL | Age: 88
Discharge: HOME | End: 2023-01-06
Admitting: FAMILY MEDICINE
Payer: MEDICARE

## 2023-01-06 DIAGNOSIS — Z12.31 SCREENING MAMMOGRAM, ENCOUNTER FOR: ICD-10-CM

## 2023-01-06 PROCEDURE — 77067 SCR MAMMO BI INCL CAD: CPT

## 2023-01-06 PROCEDURE — 77063 BREAST TOMOSYNTHESIS BI: CPT

## 2023-04-12 ENCOUNTER — HOSPITAL ENCOUNTER (OUTPATIENT)
Facility: HOSPITAL | Age: 88
Setting detail: OBSERVATION
Discharge: HOME | End: 2023-04-14
Attending: EMERGENCY MEDICINE | Admitting: HOSPITALIST
Payer: MEDICARE

## 2023-04-12 ENCOUNTER — APPOINTMENT (OUTPATIENT)
Dept: CT IMAGING | Facility: HOSPITAL | Age: 88
End: 2023-04-12
Payer: MEDICARE

## 2023-04-12 DIAGNOSIS — R10.9 ACUTE ABDOMINAL PAIN: Primary | ICD-10-CM

## 2023-04-12 DIAGNOSIS — K59.00 CONSTIPATION, UNSPECIFIED CONSTIPATION TYPE: ICD-10-CM

## 2023-04-12 DIAGNOSIS — R11.2 NAUSEA AND VOMITING, UNSPECIFIED VOMITING TYPE: ICD-10-CM

## 2023-04-12 PROBLEM — R10.84 GENERALIZED ABDOMINAL PAIN: Status: ACTIVE | Noted: 2023-04-12

## 2023-04-12 LAB
ALBUMIN SERPL-MCNC: 3.9 G/DL (ref 3.5–5.2)
ALBUMIN/GLOB SERPL: 2.2 G/DL
ALP SERPL-CCNC: 139 U/L (ref 39–117)
ALT SERPL W P-5'-P-CCNC: 16 U/L (ref 1–33)
ANION GAP SERPL CALCULATED.3IONS-SCNC: 10 MMOL/L (ref 5–15)
AST SERPL-CCNC: 26 U/L (ref 1–32)
BACTERIA UR QL AUTO: ABNORMAL /HPF
BASOPHILS # BLD AUTO: 0.02 10*3/MM3 (ref 0–0.2)
BASOPHILS NFR BLD AUTO: 0.2 % (ref 0–1.5)
BILIRUB SERPL-MCNC: 0.7 MG/DL (ref 0–1.2)
BILIRUB UR QL STRIP: ABNORMAL
BUN SERPL-MCNC: 9 MG/DL (ref 8–23)
BUN/CREAT SERPL: 17 (ref 7–25)
CALCIUM SPEC-SCNC: 9.1 MG/DL (ref 8.6–10.5)
CHLORIDE SERPL-SCNC: 101 MMOL/L (ref 98–107)
CLARITY UR: CLEAR
CO2 SERPL-SCNC: 22 MMOL/L (ref 22–29)
COLOR UR: ABNORMAL
CREAT SERPL-MCNC: 0.53 MG/DL (ref 0.57–1)
DEPRECATED RDW RBC AUTO: 40.6 FL (ref 37–54)
EGFRCR SERPLBLD CKD-EPI 2021: 90.8 ML/MIN/1.73
EOSINOPHIL # BLD AUTO: 0 10*3/MM3 (ref 0–0.4)
EOSINOPHIL NFR BLD AUTO: 0 % (ref 0.3–6.2)
ERYTHROCYTE [DISTWIDTH] IN BLOOD BY AUTOMATED COUNT: 12.3 % (ref 12.3–15.4)
GLOBULIN UR ELPH-MCNC: 1.8 GM/DL
GLUCOSE SERPL-MCNC: 134 MG/DL (ref 65–99)
GLUCOSE UR STRIP-MCNC: NEGATIVE MG/DL
HCT VFR BLD AUTO: 34.8 % (ref 34–46.6)
HGB BLD-MCNC: 11.8 G/DL (ref 12–15.9)
HGB UR QL STRIP.AUTO: NEGATIVE
HYALINE CASTS UR QL AUTO: ABNORMAL /LPF
IMM GRANULOCYTES # BLD AUTO: 0.03 10*3/MM3 (ref 0–0.05)
IMM GRANULOCYTES NFR BLD AUTO: 0.4 % (ref 0–0.5)
KETONES UR QL STRIP: ABNORMAL
LEUKOCYTE ESTERASE UR QL STRIP.AUTO: ABNORMAL
LIPASE SERPL-CCNC: 13 U/L (ref 13–60)
LYMPHOCYTES # BLD AUTO: 0.72 10*3/MM3 (ref 0.7–3.1)
LYMPHOCYTES NFR BLD AUTO: 8.6 % (ref 19.6–45.3)
MCH RBC QN AUTO: 30.6 PG (ref 26.6–33)
MCHC RBC AUTO-ENTMCNC: 33.9 G/DL (ref 31.5–35.7)
MCV RBC AUTO: 90.2 FL (ref 79–97)
MONOCYTES # BLD AUTO: 0.38 10*3/MM3 (ref 0.1–0.9)
MONOCYTES NFR BLD AUTO: 4.6 % (ref 5–12)
MUCOUS THREADS URNS QL MICRO: ABNORMAL /HPF
NEUTROPHILS NFR BLD AUTO: 7.2 10*3/MM3 (ref 1.7–7)
NEUTROPHILS NFR BLD AUTO: 86.2 % (ref 42.7–76)
NITRITE UR QL STRIP: NEGATIVE
NRBC BLD AUTO-RTO: 0 /100 WBC (ref 0–0.2)
NT-PROBNP SERPL-MCNC: 176 PG/ML (ref 0–1800)
PH UR STRIP.AUTO: 5.5 [PH] (ref 5–8)
PLATELET # BLD AUTO: 242 10*3/MM3 (ref 140–450)
PMV BLD AUTO: 9.8 FL (ref 6–12)
POTASSIUM SERPL-SCNC: 3.5 MMOL/L (ref 3.5–5.2)
PROT SERPL-MCNC: 5.7 G/DL (ref 6–8.5)
PROT UR QL STRIP: ABNORMAL
RBC # BLD AUTO: 3.86 10*6/MM3 (ref 3.77–5.28)
RBC # UR STRIP: ABNORMAL /HPF
REF LAB TEST METHOD: ABNORMAL
SODIUM SERPL-SCNC: 133 MMOL/L (ref 136–145)
SP GR UR STRIP: 1.02 (ref 1–1.03)
SQUAMOUS #/AREA URNS HPF: ABNORMAL /HPF
UROBILINOGEN UR QL STRIP: ABNORMAL
WBC # UR STRIP: ABNORMAL /HPF
WBC NRBC COR # BLD: 8.35 10*3/MM3 (ref 3.4–10.8)

## 2023-04-12 PROCEDURE — 81001 URINALYSIS AUTO W/SCOPE: CPT | Performed by: PHYSICIAN ASSISTANT

## 2023-04-12 PROCEDURE — G0378 HOSPITAL OBSERVATION PER HR: HCPCS

## 2023-04-12 PROCEDURE — 99285 EMERGENCY DEPT VISIT HI MDM: CPT

## 2023-04-12 PROCEDURE — 74176 CT ABD & PELVIS W/O CONTRAST: CPT

## 2023-04-12 PROCEDURE — 96374 THER/PROPH/DIAG INJ IV PUSH: CPT

## 2023-04-12 PROCEDURE — 96375 TX/PRO/DX INJ NEW DRUG ADDON: CPT

## 2023-04-12 PROCEDURE — 80053 COMPREHEN METABOLIC PANEL: CPT | Performed by: PHYSICIAN ASSISTANT

## 2023-04-12 PROCEDURE — 25010000002 MORPHINE PER 10 MG: Performed by: EMERGENCY MEDICINE

## 2023-04-12 PROCEDURE — 25010000002 METOCLOPRAMIDE PER 10 MG: Performed by: PHYSICIAN ASSISTANT

## 2023-04-12 PROCEDURE — 83880 ASSAY OF NATRIURETIC PEPTIDE: CPT | Performed by: HOSPITALIST

## 2023-04-12 PROCEDURE — 85025 COMPLETE CBC W/AUTO DIFF WBC: CPT | Performed by: PHYSICIAN ASSISTANT

## 2023-04-12 PROCEDURE — P9612 CATHETERIZE FOR URINE SPEC: HCPCS

## 2023-04-12 PROCEDURE — 25010000002 PROCHLORPERAZINE 10 MG/2ML SOLUTION: Performed by: HOSPITALIST

## 2023-04-12 PROCEDURE — 83690 ASSAY OF LIPASE: CPT | Performed by: PHYSICIAN ASSISTANT

## 2023-04-12 PROCEDURE — 25010000002 ONDANSETRON PER 1 MG: Performed by: PHYSICIAN ASSISTANT

## 2023-04-12 PROCEDURE — 25010000002 SODIUM CHLORIDE 0.9 % WITH KCL 20 MEQ 20-0.9 MEQ/L-% SOLUTION: Performed by: HOSPITALIST

## 2023-04-12 RX ORDER — MORPHINE SULFATE 2 MG/ML
4 INJECTION, SOLUTION INTRAMUSCULAR; INTRAVENOUS ONCE
Status: DISCONTINUED | OUTPATIENT
Start: 2023-04-12 | End: 2023-04-12

## 2023-04-12 RX ORDER — CYANOCOBALAMIN 1000 UG/ML
1000 INJECTION INTRAMUSCULAR; SUBCUTANEOUS
COMMUNITY
Start: 2023-02-07 | End: 2023-04-14 | Stop reason: HOSPADM

## 2023-04-12 RX ORDER — FLUTICASONE PROPIONATE 50 MCG
2 SPRAY, SUSPENSION (ML) NASAL DAILY PRN
Status: DISCONTINUED | OUTPATIENT
Start: 2023-04-12 | End: 2023-04-14

## 2023-04-12 RX ORDER — ASPIRIN 81 MG/1
81 TABLET, CHEWABLE ORAL DAILY
Status: DISCONTINUED | OUTPATIENT
Start: 2023-04-12 | End: 2023-04-14 | Stop reason: HOSPADM

## 2023-04-12 RX ORDER — DOCUSATE SODIUM 100 MG/1
100 CAPSULE, LIQUID FILLED ORAL 2 TIMES DAILY
Status: DISCONTINUED | OUTPATIENT
Start: 2023-04-12 | End: 2023-04-14 | Stop reason: HOSPADM

## 2023-04-12 RX ORDER — MORPHINE SULFATE 2 MG/ML
2 INJECTION, SOLUTION INTRAMUSCULAR; INTRAVENOUS EVERY 4 HOURS PRN
Status: DISCONTINUED | OUTPATIENT
Start: 2023-04-12 | End: 2023-04-14

## 2023-04-12 RX ORDER — SODIUM CHLORIDE AND POTASSIUM CHLORIDE 150; 900 MG/100ML; MG/100ML
75 INJECTION, SOLUTION INTRAVENOUS CONTINUOUS
Status: DISCONTINUED | OUTPATIENT
Start: 2023-04-12 | End: 2023-04-13

## 2023-04-12 RX ORDER — LISINOPRIL 20 MG/1
40 TABLET ORAL 2 TIMES DAILY
Status: DISCONTINUED | OUTPATIENT
Start: 2023-04-12 | End: 2023-04-12

## 2023-04-12 RX ORDER — PANTOPRAZOLE SODIUM 40 MG/10ML
40 INJECTION, POWDER, LYOPHILIZED, FOR SOLUTION INTRAVENOUS EVERY 12 HOURS SCHEDULED
Status: DISCONTINUED | OUTPATIENT
Start: 2023-04-12 | End: 2023-04-13

## 2023-04-12 RX ORDER — ONDANSETRON 2 MG/ML
4 INJECTION INTRAMUSCULAR; INTRAVENOUS EVERY 6 HOURS PRN
Status: DISCONTINUED | OUTPATIENT
Start: 2023-04-12 | End: 2023-04-14

## 2023-04-12 RX ORDER — MORPHINE SULFATE 2 MG/ML
4 INJECTION, SOLUTION INTRAMUSCULAR; INTRAVENOUS ONCE
Status: COMPLETED | OUTPATIENT
Start: 2023-04-12 | End: 2023-04-12

## 2023-04-12 RX ORDER — PROCHLORPERAZINE EDISYLATE 5 MG/ML
10 INJECTION INTRAMUSCULAR; INTRAVENOUS EVERY 4 HOURS PRN
Status: DISCONTINUED | OUTPATIENT
Start: 2023-04-12 | End: 2023-04-14

## 2023-04-12 RX ORDER — AMLODIPINE BESYLATE 5 MG/1
5 TABLET ORAL DAILY
Status: DISCONTINUED | OUTPATIENT
Start: 2023-04-12 | End: 2023-04-12

## 2023-04-12 RX ORDER — ONDANSETRON 2 MG/ML
4 INJECTION INTRAMUSCULAR; INTRAVENOUS ONCE
Status: COMPLETED | OUTPATIENT
Start: 2023-04-12 | End: 2023-04-12

## 2023-04-12 RX ORDER — METOCLOPRAMIDE HYDROCHLORIDE 5 MG/ML
10 INJECTION INTRAMUSCULAR; INTRAVENOUS ONCE
Status: COMPLETED | OUTPATIENT
Start: 2023-04-12 | End: 2023-04-12

## 2023-04-12 RX ORDER — POLYETHYLENE GLYCOL 3350 17 G/17G
17 POWDER, FOR SOLUTION ORAL 2 TIMES DAILY
Status: DISCONTINUED | OUTPATIENT
Start: 2023-04-12 | End: 2023-04-14 | Stop reason: HOSPADM

## 2023-04-12 RX ORDER — ONDANSETRON 4 MG/1
1 TABLET, FILM COATED ORAL EVERY 6 HOURS
COMMUNITY
Start: 2023-04-04

## 2023-04-12 RX ORDER — ATORVASTATIN CALCIUM 20 MG/1
20 TABLET, FILM COATED ORAL NIGHTLY
Status: DISCONTINUED | OUTPATIENT
Start: 2023-04-12 | End: 2023-04-13

## 2023-04-12 RX ORDER — METOCLOPRAMIDE HYDROCHLORIDE 5 MG/ML
10 INJECTION INTRAMUSCULAR; INTRAVENOUS ONCE
Status: DISCONTINUED | OUTPATIENT
Start: 2023-04-12 | End: 2023-04-12

## 2023-04-12 RX ORDER — MORPHINE SULFATE 2 MG/ML
2 INJECTION, SOLUTION INTRAMUSCULAR; INTRAVENOUS ONCE
Status: DISCONTINUED | OUTPATIENT
Start: 2023-04-12 | End: 2023-04-12

## 2023-04-12 RX ORDER — AMLODIPINE BESYLATE 5 MG/1
10 TABLET ORAL DAILY
Status: DISCONTINUED | OUTPATIENT
Start: 2023-04-12 | End: 2023-04-14 | Stop reason: HOSPADM

## 2023-04-12 RX ORDER — BISACODYL 10 MG
10 SUPPOSITORY, RECTAL RECTAL DAILY PRN
Status: DISCONTINUED | OUTPATIENT
Start: 2023-04-12 | End: 2023-04-14

## 2023-04-12 RX ADMIN — POTASSIUM CHLORIDE AND SODIUM CHLORIDE 75 ML/HR: 900; 150 INJECTION, SOLUTION INTRAVENOUS at 22:29

## 2023-04-12 RX ADMIN — SODIUM CHLORIDE, POTASSIUM CHLORIDE, SODIUM LACTATE AND CALCIUM CHLORIDE 500 ML: 600; 310; 30; 20 INJECTION, SOLUTION INTRAVENOUS at 12:23

## 2023-04-12 RX ADMIN — POTASSIUM CHLORIDE AND SODIUM CHLORIDE 75 ML/HR: 900; 150 INJECTION, SOLUTION INTRAVENOUS at 22:32

## 2023-04-12 RX ADMIN — BISACODYL 10 MG: 10 SUPPOSITORY RECTAL at 17:57

## 2023-04-12 RX ADMIN — METOCLOPRAMIDE 10 MG: 5 INJECTION, SOLUTION INTRAMUSCULAR; INTRAVENOUS at 14:12

## 2023-04-12 RX ADMIN — ATORVASTATIN CALCIUM 20 MG: 20 TABLET, FILM COATED ORAL at 20:43

## 2023-04-12 RX ADMIN — ONDANSETRON 4 MG: 2 INJECTION INTRAMUSCULAR; INTRAVENOUS at 12:03

## 2023-04-12 RX ADMIN — ASPIRIN 81 MG: 81 TABLET, CHEWABLE ORAL at 17:56

## 2023-04-12 RX ADMIN — MORPHINE SULFATE 4 MG: 2 INJECTION, SOLUTION INTRAMUSCULAR; INTRAVENOUS at 12:03

## 2023-04-12 RX ADMIN — AMLODIPINE BESYLATE 10 MG: 5 TABLET ORAL at 17:56

## 2023-04-12 RX ADMIN — DOCUSATE SODIUM 100 MG: 100 CAPSULE, LIQUID FILLED ORAL at 20:43

## 2023-04-12 RX ADMIN — PROCHLORPERAZINE EDISYLATE 10 MG: 5 INJECTION INTRAMUSCULAR; INTRAVENOUS at 17:16

## 2023-04-12 RX ADMIN — PANTOPRAZOLE SODIUM 40 MG: 40 INJECTION, POWDER, FOR SOLUTION INTRAVENOUS at 20:43

## 2023-04-12 NOTE — ED NOTES
Pt constipation x 8 days. Pt took mirilax and stool softener and it made her  N/V and faint feeling. Pt positive for covid 2 weeks ago.

## 2023-04-12 NOTE — ED NOTES
..Nursing report ED to floor  Katja Klein  87 y.o.  female    HPI :   Chief Complaint   Patient presents with    Constipation       Admitting doctor:   Matt Morales MD    Admitting diagnosis:   There were no encounter diagnoses.    Code status:   Current Code Status       Date Active Code Status Order ID Comments User Context       Prior            Allergies:   Codeine    Isolation:   No active isolations    Intake and Output  No intake or output data in the 24 hours ending 04/12/23 1504    Weight:       04/12/23  1124   Weight: 76.2 kg (168 lb)       Most recent vitals:   Vitals:    04/12/23 1128 04/12/23 1129 04/12/23 1131 04/12/23 1301   BP: 129/68  127/73 123/68   Pulse:  68 69 80   Resp:       Temp:       SpO2:  94% 98% 92%   Weight:       Height:           Active LDAs/IV Access:   Lines, Drains & Airways       Active LDAs       Name Placement date Placement time Site Days    Peripheral IV 04/12/23 1202 Right Antecubital 04/12/23  1202  Antecubital  less than 1                    Labs (abnormal labs have a star):   Labs Reviewed   COMPREHENSIVE METABOLIC PANEL - Abnormal; Notable for the following components:       Result Value    Glucose 134 (*)     Creatinine 0.53 (*)     Sodium 133 (*)     Total Protein 5.7 (*)     Alkaline Phosphatase 139 (*)     All other components within normal limits    Narrative:     GFR Normal >60  Chronic Kidney Disease <60  Kidney Failure <15    The GFR formula is only valid for adults with stable renal function between ages 18 and 70.   URINALYSIS W/ MICROSCOPIC IF INDICATED (NO CULTURE) - Abnormal; Notable for the following components:    Color, UA Dark Yellow (*)     Ketones, UA 15 mg/dL (1+) (*)     Bilirubin, UA Small (1+) (*)     Protein, UA 30 mg/dL (1+) (*)     Leuk Esterase, UA Trace (*)     Urobilinogen, UA 2.0 E.U./dL (*)     All other components within normal limits   CBC WITH AUTO DIFFERENTIAL - Abnormal; Notable for the following components:    Hemoglobin 11.8  (*)     Neutrophil % 86.2 (*)     Lymphocyte % 8.6 (*)     Monocyte % 4.6 (*)     Eosinophil % 0.0 (*)     Neutrophils, Absolute 7.20 (*)     All other components within normal limits   URINALYSIS, MICROSCOPIC ONLY - Abnormal; Notable for the following components:    WBC, UA 3-5 (*)     Mucus, UA Moderate/2+ (*)     All other components within normal limits   LIPASE - Normal   CBC AND DIFFERENTIAL    Narrative:     The following orders were created for panel order CBC & Differential.  Procedure                               Abnormality         Status                     ---------                               -----------         ------                     CBC Auto Differential[[id number]]        Abnormal            Final result                 Please view results for these tests on the individual orders.       EKG:   No orders to display       Meds given in ED:   Medications   ondansetron (ZOFRAN) injection 4 mg (4 mg Intravenous Given 4/12/23 1203)   lactated ringers bolus 500 mL (500 mL Intravenous New Bag 4/12/23 1223)   morphine injection 4 mg (4 mg Intravenous Given 4/12/23 1203)   metoclopramide (REGLAN) injection 10 mg (10 mg Intravenous Given 4/12/23 1412)       Imaging results:  CT Abdomen Pelvis Without Contrast    Result Date: 4/12/2023  Limited in the absence of intravenous contrast. 1. Colonic diverticulosis and constipation. 2. Stable appearance of the lobulated cystic lesions/multiseptated lesion within the right hemipelvis adjacent to the cecum which has been followed up by pelvic sonogram. Continued followup is recommended.  Radiation dose reduction techniques were utilized, including automated exposure control and exposure modulation based on body size.        Ambulatory status:   - x1 assist    Social issues:   Social History     Socioeconomic History    Marital status:    Tobacco Use    Smoking status: Former    Smokeless tobacco: Never   Vaping Use    Vaping Use: Never used   Substance and  Sexual Activity    Alcohol use: No    Drug use: No    Sexual activity: Defer            Serena Rodríguez RN  04/12/23 15:04 EDT

## 2023-04-12 NOTE — ED PROVIDER NOTES
MD ATTESTATION NOTE    The RAJENDRA and I have discussed this patient's history, physical exam, and treatment plan.  I have reviewed the documentation and personally had a face to face interaction with the patient. I affirm the documentation and agree with the treatment and plan.  The attached note describes my personal findings.    I provided a substantive portion of the care of this patient. I personally performed the physical exam, in its entirety.    Independent Historians: Patient, family    A complete HPI/ROS/PMH/PSH/SH/FH are unobtainable due to: Nothing    Chronic or social conditions impacting patient care (social determinants of health): None    Katja Klein is a 87 y.o. female who presents to the ED c/o having acute constipation for the last week with acute nausea for the last 2 to 3 days and then acute vomiting this morning.  She reports that she has not been eating or drinking well with all of these complaints.  She states that she has been taking over-the-counter medications including magnesium citrate as well as Senokot.  She states that her symptoms have not been improving.  A family friend who is a nurse practitioner gave her IV fluids on Easter and she was feeling better for a day.  She reports this morning when she stood up she almost passed out.  She denies any chest pain or shortness of breath.  She does report abdominal pain.  She states that she tested positive for COVID about 3 weeks ago and took Paxlovd and has never really started feeling better since then.      Review of prior external notes (non-ED): Neurology note dated 11/16/2022 with a prior left hemispheric stroke in 2018.  She is to continue aspirin daily.  She had controlled blood pressure at that time.    Review of prior external test results outside of this encounter: Laboratory evaluation 2226 2021 with normal BMP, normal CBC    Prescription drug monitoring program review: [facility] reviewed by Matt Morales MD, Kavhe Lee MD    My review reveals gabapentin and diazepam last filled January of this year.      On exam:  GENERAL: Awake, alert, no acute distress  SKIN: Warm, dry  HENT: Normocephalic, atraumatic  EYES: no scleral icterus  CV: regular rhythm, regular rate  RESPIRATORY: normal effort, lungs clear  ABDOMEN: soft, mild generalized tenderness, nondistended  MUSCULOSKELETAL: no deformity  NEURO: alert, moves all extremities, follows commands    Labs  Recent Results (from the past 24 hour(s))   Comprehensive Metabolic Panel    Collection Time: 04/12/23 12:01 PM    Specimen: Blood   Result Value Ref Range    Glucose 134 (H) 65 - 99 mg/dL    BUN 9 8 - 23 mg/dL    Creatinine 0.53 (L) 0.57 - 1.00 mg/dL    Sodium 133 (L) 136 - 145 mmol/L    Potassium 3.5 3.5 - 5.2 mmol/L    Chloride 101 98 - 107 mmol/L    CO2 22.0 22.0 - 29.0 mmol/L    Calcium 9.1 8.6 - 10.5 mg/dL    Total Protein 5.7 (L) 6.0 - 8.5 g/dL    Albumin 3.9 3.5 - 5.2 g/dL    ALT (SGPT) 16 1 - 33 U/L    AST (SGOT) 26 1 - 32 U/L    Alkaline Phosphatase 139 (H) 39 - 117 U/L    Total Bilirubin 0.7 0.0 - 1.2 mg/dL    Globulin 1.8 gm/dL    A/G Ratio 2.2 g/dL    BUN/Creatinine Ratio 17.0 7.0 - 25.0    Anion Gap 10.0 5.0 - 15.0 mmol/L    eGFR 90.8 >60.0 mL/min/1.73   Lipase    Collection Time: 04/12/23 12:01 PM    Specimen: Blood   Result Value Ref Range    Lipase 13 13 - 60 U/L   CBC Auto Differential    Collection Time: 04/12/23 12:01 PM    Specimen: Blood   Result Value Ref Range    WBC 8.35 3.40 - 10.80 10*3/mm3    RBC 3.86 3.77 - 5.28 10*6/mm3    Hemoglobin 11.8 (L) 12.0 - 15.9 g/dL    Hematocrit 34.8 34.0 - 46.6 %    MCV 90.2 79.0 - 97.0 fL    MCH 30.6 26.6 - 33.0 pg    MCHC 33.9 31.5 - 35.7 g/dL    RDW 12.3 12.3 - 15.4 %    RDW-SD 40.6 37.0 - 54.0 fl    MPV 9.8 6.0 - 12.0 fL    Platelets 242 140 - 450 10*3/mm3    Neutrophil % 86.2 (H) 42.7 - 76.0 %    Lymphocyte % 8.6 (L) 19.6 - 45.3 %    Monocyte % 4.6 (L) 5.0 - 12.0 %    Eosinophil % 0.0 (L) 0.3 - 6.2 %    Basophil % 0.2 0.0 -  1.5 %    Immature Grans % 0.4 0.0 - 0.5 %    Neutrophils, Absolute 7.20 (H) 1.70 - 7.00 10*3/mm3    Lymphocytes, Absolute 0.72 0.70 - 3.10 10*3/mm3    Monocytes, Absolute 0.38 0.10 - 0.90 10*3/mm3    Eosinophils, Absolute 0.00 0.00 - 0.40 10*3/mm3    Basophils, Absolute 0.02 0.00 - 0.20 10*3/mm3    Immature Grans, Absolute 0.03 0.00 - 0.05 10*3/mm3    nRBC 0.0 0.0 - 0.2 /100 WBC   Urinalysis With Microscopic If Indicated (No Culture) - Straight Cath    Collection Time: 04/12/23 12:31 PM    Specimen: Straight Cath; Urine   Result Value Ref Range    Color, UA Dark Yellow (A) Yellow, Straw    Appearance, UA Clear Clear    pH, UA 5.5 5.0 - 8.0    Specific Gravity, UA 1.022 1.005 - 1.030    Glucose, UA Negative Negative    Ketones, UA 15 mg/dL (1+) (A) Negative    Bilirubin, UA Small (1+) (A) Negative    Blood, UA Negative Negative    Protein, UA 30 mg/dL (1+) (A) Negative    Leuk Esterase, UA Trace (A) Negative    Nitrite, UA Negative Negative    Urobilinogen, UA 2.0 E.U./dL (A) 0.2 - 1.0 E.U./dL   Urinalysis, Microscopic Only - Straight Cath    Collection Time: 04/12/23 12:31 PM    Specimen: Straight Cath; Urine   Result Value Ref Range    RBC, UA 0-2 None Seen, 0-2 /HPF    WBC, UA 3-5 (A) None Seen, 0-2 /HPF    Bacteria, UA None Seen None Seen /HPF    Squamous Epithelial Cells, UA 0-2 None Seen, 0-2 /HPF    Hyaline Casts, UA 0-2 None Seen /LPF    Mucus, UA Moderate/2+ (A) None Seen, Trace /HPF    Methodology Manual Light Microscopy        Radiology  CT Abdomen Pelvis Without Contrast    Result Date: 4/12/2023  CT ABDOMEN AND PELVIS WITHOUT CONTRAST  HISTORY: 87-year-old female with abdominal pain and vomiting.  TECHNIQUE: Axial CT images of the abdomen and pelvis were obtained without administration of intravenous contrast. The patient was not given oral contrast. Coronal and sagittal reformats were obtained.  COMPARISON: 07/20/2020  FINDINGS: Small sliding hiatal hernia is present. The small and large bowel loops  demonstrate normal caliber. Marked colonic diverticulosis is present. No appreciable colonic wall thickening. Moderate to large amount of formed stool seen throughout the colon.  Noncontrast attenuation of liver is normal. The spleen is normal in size. No intrahepatic biliary dilatation. Gallbladder is surgically absent. The pancreas is normal without ductal dilatation. Bilateral low-density adrenal gland nodules/adenomas are unchanged. No renal calculi or hydronephrosis. Moderate calcified atherosclerotic plaque is seen within the abdominal aorta and its branches. The urinary bladder is minimally distended limiting evaluation. The uterus is not identified and is likely surgically absent.  Again demonstrated is a complex lobulated hypoattenuating/cystic lesion within the right hemipelvis with approximate measurements of 6.7 x 2.7 cm. The most medial and posterior lesion demonstrates dependent/layering hyperdensity. The findings are not significantly changed from prior CT and an intervening pelvic sonogram has demonstrated 4 separate cystic lesions in this region at least one of which is mildly complex with internal septations. Continued followup recommended.  There is no pathological retroperitoneal lymphadenopathy. There is a small left posterolateral diaphragmatic hernia. Diffuse osteopenia is present. 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      Limited in the absence of intravenous contrast. 1. Colonic diverticulosis and constipation. 2. Stable appearance of the lobulated cystic lesions/multiseptated lesion within the right hemipelvis adjacent to the cecum which has been followed up by pelvic sonogram. Continued followup is recommended.  Radiation dose reduction techniques were utilized, including automated exposure control and exposure modulation based on body size.         Medical Decision Making:  ED Course as of 04/12/23 1912 Wed Apr 12, 2023   1220 CT Abdomen  Pelvis Without Contrast  My independent interpretation of the CT of the abdomen pelvis is no large stool burden.  No fecal impaction. [TR]   1314 The patient's laboratory evaluation shows a mild anemia.  This is acute but not significantly different from her prior hemoglobins.  Her chemistries are essentially normal.  Her urinalysis shows trace ketones and evidence of dehydration. [TR]   1330 Lipase: 13 [KA]   1330 Glucose(!): 134 [KA]   1330 Creatinine(!): 0.53 [KA]   1330 Nitrite, UA: Negative [KA]   1330 Bacteria, UA: None Seen [KA]   1330 WBC: 8.35 [KA]   1330 Hemoglobin(!): 11.8 [KA]   1413 I discussed patient with Dr. Morales, hospitalist including the patient's history presentation labs and imaging and he agrees to admit for further evaluation and treatment. [KA]   1538 Alkaline Phosphatase(!): 139  Chronically elevated, stable [KA]      ED Course User Index  [KA] Gabriella Bland PA  [TR] Kaveh Lee MD       With the patient's nausea, abdominal pain and constipation, plan to give her pain and nausea medicine.  We will give her IV fluids given her description of orthostasis this morning.  We will check chemistries, blood counts, urinalysis.  Plan to CT her abdomen pelvis to rule out obstruction and infectious causes.  My differential diagnosis includes constipation, dehydration, orthostasis, fecal impaction, cholecystitis, appendicitis, UTI, sepsis, and others.    Procedures:  Procedures          The patient has started, but not completed, their COVID-19 vaccination series.      Diagnosis  Final diagnoses:   Acute abdominal pain   Nausea and vomiting, unspecified vomiting type   Constipation, unspecified constipation type       Note Disclaimer: At [facility], we believe that sharing information builds trust and better relationships. You are receiving this note because you recently visited [facility]. It is possible you will see health information before a provider has talked with you about it.  This kind of information can be easy to misunderstand. To help you fully understand what it means for your health, we urge you to discuss this note with your provider.     Kaveh Lee MD  04/12/23 1912

## 2023-04-12 NOTE — PROGRESS NOTES
Clinical Pharmacy Services: Medication History    Katja Klein is a 87 y.o. female presenting to [facility] for   Chief Complaint   Patient presents with   • Constipation       She  has a past medical history of CTS (carpal tunnel syndrome), Hypertension, Migraine, and Stroke.    Allergies as of 04/12/2023 - Reviewed 04/12/2023   Allergen Reaction Noted   • Codeine Nausea Only 08/21/2018       Medication information was obtained from: Patient   Pharmacy and Phone Number:     Prior to Admission Medications     Prescriptions Last Dose Informant Patient Reported? Taking?    acetaminophen (TYLENOL) 500 MG tablet Past Week Self Yes Yes    Take 1 tablet by mouth As Needed.    amLODIPine (NORVASC) 5 MG tablet 4/12/2023 Self Yes Yes    Take 1 tablet by mouth Daily.    aspirin 81 MG tablet 4/11/2023 Self Yes Yes    Take 1 tablet by mouth Daily.    atorvastatin (LIPITOR) 40 MG tablet 4/11/2023 Self Yes Yes    Take 20 mg by mouth Every Night.    Dodex 1000 MCG/ML injection Past Month Self Yes Yes    Inject 1 mL into the appropriate muscle as directed by prescriber Every 30 (Thirty) Days. Due 4/13/23    famotidine (PEPCID) 20 MG tablet 4/11/2023 Self Yes Yes    Take 1 tablet by mouth 2 (Two) Times a Day.    fluticasone (FLONASE) 50 MCG/ACT nasal spray 4/11/2023 Self Yes Yes    2 sprays into the nostril(s) as directed by provider Daily As Needed for Rhinitis.    lisinopril (PRINIVIL,ZESTRIL) 40 MG tablet 4/12/2023 Self Yes Yes    Take 1 tablet by mouth 2 (Two) Times a Day.    NON FORMULARY 4/11/2023 Self Yes Yes    Take 500 mg by mouth Daily. Hemp oil Drops    NON FORMULARY 4/11/2023 Self Yes Yes    Take 1 dose by mouth Every Other Day. Organic Prebiotic Fiber (Vegan)    ondansetron (ZOFRAN) 4 MG tablet Past Week Self Yes Yes    Take 1 tablet by mouth Every 6 (Six) Hours.    Polyethylene Glycol 3350 (MIRALAX PO) 4/11/2023 Self Yes Yes    Take 17 g by mouth Daily As Needed.    TURMERIC-GINGER PO 4/11/2023 Self  Yes Yes    Take 1 dose by mouth Daily.    Misc Natural Products (NEURIVA PO) More than a month Self Yes No    Take 1 dose by mouth Daily.    Unable to find  Self Yes No    Take  by mouth 1 (One) Time. Domperidone-10mg      1 and a half hours before meals.            Medication notes:     This medication list is complete to the best of my knowledge as of 4/12/2023    Please call if questions.    Bharat Powers  Medication History Technician  [phone]    4/12/2023 14:56 EDT

## 2023-04-12 NOTE — ED PROVIDER NOTES
EMERGENCY DEPARTMENT ENCOUNTER    Room Number:  05/05  Date seen:  4/12/2023  PCP: Opal Marina MD  Discussed/ obtained information from independent historians: patient      HPI:  Chief Complaint: abdominal pain  A complete HPI/ROS/PMH/PSH/SH/FH are unobtainable due to: none  Context: Katja Klein is a 87 y.o. female who presents to the ED c/o generalized abdominal pain for the last couple of days.  Got a lot worse this morning and she started having nausea with multiple episodes of vomiting at home and here in the ER.  She states she has been constipated for about a week.  She has had multiple prior abdominal surgeries including cholecystectomy and hysterectomy.  She denies any fevers chills or urinary symptoms.  She had a near syncopal episode after vomiting prior to arrival.      External (non-ED) record review: Neurology evaluation 11/16/2022 for stroke. History of left hemispheric stroke, continued on aspirin daily as well as Lipitor, blood pressure well controlled.      PAST MEDICAL HISTORY  Active Ambulatory Problems     Diagnosis Date Noted   • Stroke-like symptoms 08/21/2018   • Cerebrovascular accident (CVA) due to embolism 11/21/2018   • Essential hypertension 11/21/2018   • Hyperlipidemia LDL goal <70 11/21/2018   • Neck pain 08/21/2019   • Chest pain in adult 02/23/2021     Resolved Ambulatory Problems     Diagnosis Date Noted   • No Resolved Ambulatory Problems     Past Medical History:   Diagnosis Date   • CTS (carpal tunnel syndrome)    • Hypertension    • Migraine    • Stroke          PAST SURGICAL HISTORY  Past Surgical History:   Procedure Laterality Date   • BREAST BIOPSY     • CARPAL TUNNEL RELEASE     • CHOLECYSTECTOMY     • HYSTERECTOMY     • OOPHORECTOMY           FAMILY HISTORY  Family History   Problem Relation Age of Onset   • Migraines Mother    • Cancer Mother    • Stroke Father    • Cancer Sister    • Migraines Sister    • Breast cancer Sister    • Breast cancer Daughter           SOCIAL HISTORY  Social History     Socioeconomic History   • Marital status:    Tobacco Use   • Smoking status: Former   • Smokeless tobacco: Never   Vaping Use   • Vaping Use: Never used   Substance and Sexual Activity   • Alcohol use: No   • Drug use: No   • Sexual activity: Defer         ALLERGIES  Codeine        REVIEW OF SYSTEMS  Review of Systems         PHYSICAL EXAM  ED Triage Vitals [04/12/23 1110]   Temp Heart Rate Resp BP SpO2   97.5 °F (36.4 °C) 73 18 127/74 98 %      Temp src Heart Rate Source Patient Position BP Location FiO2 (%)   -- -- -- -- --       Physical Exam      GENERAL: Actively vomiting on initial evaluation  HENT: normocephalic, atraumatic  EYES: no scleral icterus  CV: regular rhythm, normal rate  RESPIRATORY: normal effort CTA B  ABDOMEN: nondistended, soft, mild generalized tenderness, bowel sounds present, no guarding or rigidity  MUSCULOSKELETAL: no deformity  NEURO: alert, moves all extremities, follows commands  PSYCH:  calm, cooperative  SKIN: warm, dry    Vital signs and nursing notes reviewed.          LAB RESULTS  Recent Results (from the past 24 hour(s))   Comprehensive Metabolic Panel    Collection Time: 04/12/23 12:01 PM    Specimen: Blood   Result Value Ref Range    Glucose 134 (H) 65 - 99 mg/dL    BUN 9 8 - 23 mg/dL    Creatinine 0.53 (L) 0.57 - 1.00 mg/dL    Sodium 133 (L) 136 - 145 mmol/L    Potassium 3.5 3.5 - 5.2 mmol/L    Chloride 101 98 - 107 mmol/L    CO2 22.0 22.0 - 29.0 mmol/L    Calcium 9.1 8.6 - 10.5 mg/dL    Total Protein 5.7 (L) 6.0 - 8.5 g/dL    Albumin 3.9 3.5 - 5.2 g/dL    ALT (SGPT) 16 1 - 33 U/L    AST (SGOT) 26 1 - 32 U/L    Alkaline Phosphatase 139 (H) 39 - 117 U/L    Total Bilirubin 0.7 0.0 - 1.2 mg/dL    Globulin 1.8 gm/dL    A/G Ratio 2.2 g/dL    BUN/Creatinine Ratio 17.0 7.0 - 25.0    Anion Gap 10.0 5.0 - 15.0 mmol/L    eGFR 90.8 >60.0 mL/min/1.73   Lipase    Collection Time: 04/12/23 12:01 PM    Specimen: Blood   Result Value Ref Range     Lipase 13 13 - 60 U/L   CBC Auto Differential    Collection Time: 04/12/23 12:01 PM    Specimen: Blood   Result Value Ref Range    WBC 8.35 3.40 - 10.80 10*3/mm3    RBC 3.86 3.77 - 5.28 10*6/mm3    Hemoglobin 11.8 (L) 12.0 - 15.9 g/dL    Hematocrit 34.8 34.0 - 46.6 %    MCV 90.2 79.0 - 97.0 fL    MCH 30.6 26.6 - 33.0 pg    MCHC 33.9 31.5 - 35.7 g/dL    RDW 12.3 12.3 - 15.4 %    RDW-SD 40.6 37.0 - 54.0 fl    MPV 9.8 6.0 - 12.0 fL    Platelets 242 140 - 450 10*3/mm3    Neutrophil % 86.2 (H) 42.7 - 76.0 %    Lymphocyte % 8.6 (L) 19.6 - 45.3 %    Monocyte % 4.6 (L) 5.0 - 12.0 %    Eosinophil % 0.0 (L) 0.3 - 6.2 %    Basophil % 0.2 0.0 - 1.5 %    Immature Grans % 0.4 0.0 - 0.5 %    Neutrophils, Absolute 7.20 (H) 1.70 - 7.00 10*3/mm3    Lymphocytes, Absolute 0.72 0.70 - 3.10 10*3/mm3    Monocytes, Absolute 0.38 0.10 - 0.90 10*3/mm3    Eosinophils, Absolute 0.00 0.00 - 0.40 10*3/mm3    Basophils, Absolute 0.02 0.00 - 0.20 10*3/mm3    Immature Grans, Absolute 0.03 0.00 - 0.05 10*3/mm3    nRBC 0.0 0.0 - 0.2 /100 WBC   Urinalysis With Microscopic If Indicated (No Culture) - Straight Cath    Collection Time: 04/12/23 12:31 PM    Specimen: Straight Cath; Urine   Result Value Ref Range    Color, UA Dark Yellow (A) Yellow, Straw    Appearance, UA Clear Clear    pH, UA 5.5 5.0 - 8.0    Specific Gravity, UA 1.022 1.005 - 1.030    Glucose, UA Negative Negative    Ketones, UA 15 mg/dL (1+) (A) Negative    Bilirubin, UA Small (1+) (A) Negative    Blood, UA Negative Negative    Protein, UA 30 mg/dL (1+) (A) Negative    Leuk Esterase, UA Trace (A) Negative    Nitrite, UA Negative Negative    Urobilinogen, UA 2.0 E.U./dL (A) 0.2 - 1.0 E.U./dL   Urinalysis, Microscopic Only - Straight Cath    Collection Time: 04/12/23 12:31 PM    Specimen: Straight Cath; Urine   Result Value Ref Range    RBC, UA 0-2 None Seen, 0-2 /HPF    WBC, UA 3-5 (A) None Seen, 0-2 /HPF    Bacteria, UA None Seen None Seen /HPF    Squamous Epithelial Cells, UA 0-2  None Seen, 0-2 /HPF    Hyaline Casts, UA 0-2 None Seen /LPF    Mucus, UA Moderate/2+ (A) None Seen, Trace /HPF    Methodology Manual Light Microscopy        Ordered the above labs and reviewed the results.        RADIOLOGY  CT Abdomen Pelvis Without Contrast    Result Date: 4/12/2023  CT ABDOMEN AND PELVIS WITHOUT CONTRAST  HISTORY: 87-year-old female with abdominal pain and vomiting.  TECHNIQUE: Axial CT images of the abdomen and pelvis were obtained without administration of intravenous contrast. The patient was not given oral contrast. Coronal and sagittal reformats were obtained.  COMPARISON: 07/20/2020  FINDINGS: Small sliding hiatal hernia is present. The small and large bowel loops demonstrate normal caliber. Marked colonic diverticulosis is present. No appreciable colonic wall thickening. Moderate to large amount of formed stool seen throughout the colon.  Noncontrast attenuation of liver is normal. The spleen is normal in size. No intrahepatic biliary dilatation. Gallbladder is surgically absent. The pancreas is normal without ductal dilatation. Bilateral low-density adrenal gland nodules/adenomas are unchanged. No renal calculi or hydronephrosis. Moderate calcified atherosclerotic plaque is seen within the abdominal aorta and its branches. The urinary bladder is minimally distended limiting evaluation. The uterus is not identified and is likely surgically absent.  Again demonstrated is a complex lobulated hypoattenuating/cystic lesion within the right hemipelvis with approximate measurements of 6.7 x 2.7 cm. The most medial and posterior lesion demonstrates dependent/layering hyperdensity. The findings are not significantly changed from prior CT and an intervening pelvic sonogram has demonstrated 4 separate cystic lesions in this region at least one of which is mildly complex with internal septations. Continued followup recommended.  There is no pathological retroperitoneal lymphadenopathy. There is a  small left posterolateral diaphragmatic hernia. Diffuse osteopenia is present. 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      Limited in the absence of intravenous contrast. 1. Colonic diverticulosis and constipation. 2. Stable appearance of the lobulated cystic lesions/multiseptated lesion within the right hemipelvis adjacent to the cecum which has been followed up by pelvic sonogram. Continued followup is recommended.  Radiation dose reduction techniques were utilized, including automated exposure control and exposure modulation based on body size.         Ordered the above noted radiological studies. Reviewed by me in PACS.            PROCEDURES  Procedures              MEDICATIONS GIVEN IN ER  Medications   ondansetron (ZOFRAN) injection 4 mg (4 mg Intravenous Given 4/12/23 1203)   lactated ringers bolus 500 mL (500 mL Intravenous New Bag 4/12/23 1223)   morphine injection 4 mg (4 mg Intravenous Given 4/12/23 1203)   metoclopramide (REGLAN) injection 10 mg (10 mg Intravenous Given 4/12/23 1412)                   MEDICAL DECISION MAKING, PROGRESS, and CONSULTS    All labs have been independently reviewed by me.  All radiology studies have been reviewed by me and I have also reviewed the radiology report.   EKG's independently viewed and interpreted by me.  Discussion below represents my analysis of pertinent findings related to patient's condition, differential diagnosis, treatment plan and final disposition.      Additional sources:    - Chronic or social conditions impacting care: Multiple prior abdominal surgeries    - Shared decision making: Recommended admission due to severe nausea with vomiting and continued undifferentiated abdominal pain and she is agreeable      Orders placed during this visit:  Orders Placed This Encounter   Procedures   • CT Abdomen Pelvis Without Contrast   • Comprehensive Metabolic Panel   • Lipase   • Urinalysis With Microscopic If  Indicated (No Culture) - Urine, Clean Catch   • CBC Auto Differential   • Urinalysis, Microscopic Only - Urine, Clean Catch   • Cardiac Monitoring   • LIPPS (on-call MD unless specified)   • Initiate Observation Status   • CBC & Differential           Differential diagnosis:  Differential diagnosis includes but is not limited to:  - hepatobiliary pathology such as cholecystitis, cholangitis, and symptomatic cholelithiasis  - Pancreatitis  - Dyspepsia  - Small bowel obstruction  - Appendicitis  - Diverticulitis  - UTI including pyelonephritis  - Ureteral stone  - Zoster  - Colitis, including infectious and ischemic  - Atypical ACS        Independent interpretation of labs, radiology studies, and discussions with consultants:  ED Course as of 04/12/23 1538   Wed Apr 12, 2023   1220 CT Abdomen Pelvis Without Contrast  My independent interpretation of the CT of the abdomen pelvis is no large stool burden.  No fecal impaction. [TR]   1314 The patient's laboratory evaluation shows a mild anemia.  This is acute but not significantly different from her prior hemoglobins.  Her chemistries are essentially normal.  Her urinalysis shows trace ketones and evidence of dehydration. [TR]   1330 Lipase: 13 [KA]   1330 Glucose(!): 134 [KA]   1330 Creatinine(!): 0.53 [KA]   1330 Nitrite, UA: Negative [KA]   1330 Bacteria, UA: None Seen [KA]   1330 WBC: 8.35 [KA]   1330 Hemoglobin(!): 11.8 [KA]   1413 I discussed patient with Dr. Morales, hospitalist including the patient's history presentation labs and imaging and he agrees to admit for further evaluation and treatment. [KA]   1538 Alkaline Phosphatase(!): 139  Chronically elevated, stable [KA]      ED Course User Index  [KA] Gabriella Bland PA  [TR] Kaveh Lee MD       Counseled the patient and daughter at the bedside about all of her labs and imaging results including all incidental findings.      Patient was wearing a face mask when I entered the room and they continued to  wear a mask throughout their stay in the ED.  I wore PPE, including  gloves, face mask with shield or face mask with goggles whenever I was in the room with patient.     DIAGNOSIS  Final diagnoses:   Acute abdominal pain   Nausea and vomiting, unspecified vomiting type   Constipation, unspecified constipation type         Latest Documented Vital Signs:  As of 15:38 EDT  BP- 123/68 HR- 80 Temp- 97.5 °F (36.4 °C) O2 sat- 92%      DEYVI reviewed by Matt Morales MD, Kaveh Lee MD       --    Please note that portions of this were completed with a voice recognition program.       Note Disclaimer: At [facility], we believe that sharing information builds trust and better relationships. You are receiving this note because you are receiving care at [facility] or recently visited. It is possible you will see health information before a provider has talked with you about it. This kind of information can be easy to misunderstand. To help you fully understand what it means for your health, we urge you to discuss this note with your provider.           Garbiella Bland PA  04/12/23 1538

## 2023-04-12 NOTE — PLAN OF CARE
"Goal Outcome Evaluation:   Admitted with constipation. Patient did reportedly have small hard bowel movement in the last day or so but has not had a good normal BM in 8 days. She is c/o constant nausea worsened with movement and emesis. No witnessed vomiting here. Fluids initiated, suppository given, prn bowel regimen started. Dr. Morales ordered US to further evaluate \"mass\" seen on CT. This is apparently chronic and known. PRN zofran and compazine on board. Clear liquid diet. GI consulted. Pt alert and oriented and normally completely independent, skin is intact, no other issues noted.                 "

## 2023-04-12 NOTE — H&P
"History and physical    Primary care physician  Dr. Opal Watkins    Chief complaint  Abdominal pain with nausea vomiting  Constipation    History of present illness  87-year-old white female with history of hypertension hyperlipidemia gastroparesis and gastroesophageal reflux disease presented to [facility], [place], operated by [facility] emergency with abdominal pain for last few days followed by nausea vomiting and no BM for more than 1 week.  Patient denies any hematemesis fever chills chest pain shortness of breath.  Patient work-up in ER revealed  PAST MEDICAL HISTORY  • CVA     • Hypertension     • Hyperlipidemia     • Gastroesophageal reflux disease        PAST SURGICAL HISTORY              Procedure Laterality Date   • BREAST BIOPSY       • CARPAL TUNNEL RELEASE       • CHOLECYSTECTOMY       • HYSTERECTOMY       • OOPHORECTOMY             FAMILY HISTORY           Problem Relation Age of Onset   • Migraines Mother     • Cancer Mother     • Stroke Father     • Cancer Sister     • Migraines Sister     • Breast cancer Sister     • Breast cancer Daughter        SOCIAL HISTORY                Socioeconomic History   • Marital status:    Tobacco Use   • Smoking status: Former   • Smokeless tobacco: Never   Vaping Use   • Vaping Use: Never used   Substance and Sexual Activity   • Alcohol use: No   • Drug use: No   • Sexual activity: Defer         ALLERGIES  Codeine  Home medications reviewed     REVIEW OF SYSTEMS  Per history of present illness     PHYSICAL EXAM  Blood pressure 136/74, pulse 80, temperature 97.5 °F (36.4 °C), resp. rate 18, height 165.1 cm (65\"), weight 76.2 kg (168 lb), SpO2 92 %.    GENERAL: Awake and alert in mild discomfort    HEENT:  Unremarkable  NECK:  Supple  CV: regular rhythm, normal rate  RESPIRATORY: normal effort CTA B  ABDOMEN: nondistended, soft, mild generalized tenderness, bowel sounds present,   MUSCULOSKELETAL: no deformity  NEURO: alert, moves all extremities, follows commands  PSYCH:  calm, " cooperative  SKIN: warm, dry     LAB RESULTS  Lab Results (last 24 hours)     Procedure Component Value Units Date/Time    Urinalysis, Microscopic Only - Straight Cath [[id number]]  (Abnormal) Collected: 04/12/23 1231    Specimen: Urine from Straight Cath Updated: 04/12/23 1311     RBC, UA 0-2 /HPF      WBC, UA 3-5 /HPF      Bacteria, UA None Seen /HPF      Squamous Epithelial Cells, UA 0-2 /HPF      Hyaline Casts, UA 0-2 /LPF      Mucus, UA Moderate/2+ /HPF      Methodology Manual Light Microscopy    Urinalysis With Microscopic If Indicated (No Culture) - Straight Cath [[id number]]  (Abnormal) Collected: 04/12/23 1231    Specimen: Urine from Straight Cath Updated: 04/12/23 1309     Color, UA Dark Yellow     Appearance, UA Clear     pH, UA 5.5     Specific Gravity, UA 1.022     Glucose, UA Negative     Ketones, UA 15 mg/dL (1+)     Bilirubin, UA Small (1+)     Blood, UA Negative     Protein, UA 30 mg/dL (1+)     Leuk Esterase, UA Trace     Nitrite, UA Negative     Urobilinogen, UA 2.0 E.U./dL    Lipase [[id number]]  (Normal) Collected: 04/12/23 1201    Specimen: Blood Updated: 04/12/23 1236     Lipase 13 U/L     Comprehensive Metabolic Panel [[id number]]  (Abnormal) Collected: 04/12/23 1201    Specimen: Blood Updated: 04/12/23 1236     Glucose 134 mg/dL      BUN 9 mg/dL      Creatinine 0.53 mg/dL      Sodium 133 mmol/L      Potassium 3.5 mmol/L      Chloride 101 mmol/L      CO2 22.0 mmol/L      Calcium 9.1 mg/dL      Total Protein 5.7 g/dL      Albumin 3.9 g/dL      ALT (SGPT) 16 U/L      AST (SGOT) 26 U/L      Alkaline Phosphatase 139 U/L      Total Bilirubin 0.7 mg/dL      Globulin 1.8 gm/dL      A/G Ratio 2.2 g/dL      BUN/Creatinine Ratio 17.0     Anion Gap 10.0 mmol/L      eGFR 90.8 mL/min/1.73     Narrative:      GFR Normal >60  Chronic Kidney Disease <60  Kidney Failure <15    The GFR formula is only valid for adults with stable renal function between ages 18 and 70.    CBC & Differential [[id number]]  (Abnormal)  Collected: 04/12/23 1201    Specimen: Blood Updated: 04/12/23 1218    Narrative:      The following orders were created for panel order CBC & Differential.  Procedure                               Abnormality         Status                     ---------                               -----------         ------                     CBC Auto Differential[[id number]]        Abnormal            Final result                 Please view results for these tests on the individual orders.    CBC Auto Differential [[id number]]  (Abnormal) Collected: 04/12/23 1201    Specimen: Blood Updated: 04/12/23 1218     WBC 8.35 10*3/mm3      RBC 3.86 10*6/mm3      Hemoglobin 11.8 g/dL      Hematocrit 34.8 %      MCV 90.2 fL      MCH 30.6 pg      MCHC 33.9 g/dL      RDW 12.3 %      RDW-SD 40.6 fl      MPV 9.8 fL      Platelets 242 10*3/mm3      Neutrophil % 86.2 %      Lymphocyte % 8.6 %      Monocyte % 4.6 %      Eosinophil % 0.0 %      Basophil % 0.2 %      Immature Grans % 0.4 %      Neutrophils, Absolute 7.20 10*3/mm3      Lymphocytes, Absolute 0.72 10*3/mm3      Monocytes, Absolute 0.38 10*3/mm3      Eosinophils, Absolute 0.00 10*3/mm3      Basophils, Absolute 0.02 10*3/mm3      Immature Grans, Absolute 0.03 10*3/mm3      nRBC 0.0 /100 WBC         Imaging Results (Last 24 Hours)     Procedure Component Value Units Date/Time    CT Abdomen Pelvis Without Contrast [[id number]] Collected: 04/12/23 1259     Updated: 04/12/23 1259    Narrative:      CT ABDOMEN AND PELVIS WITHOUT CONTRAST     HISTORY: 87-year-old female with abdominal pain and vomiting.     TECHNIQUE: Axial CT images of the abdomen and pelvis were obtained  without administration of intravenous contrast. The patient was not  given oral contrast. Coronal and sagittal reformats were obtained.     COMPARISON: 07/20/2020     FINDINGS: Small sliding hiatal hernia is present. The small and large  bowel loops demonstrate normal caliber. Marked colonic diverticulosis is  present. No  appreciable colonic wall thickening. Moderate to large  amount of formed stool seen throughout the colon.     Noncontrast attenuation of liver is normal. The spleen is normal in  size. No intrahepatic biliary dilatation. Gallbladder is surgically  absent. The pancreas is normal without ductal dilatation. Bilateral  low-density adrenal gland nodules/adenomas are unchanged. No renal  calculi or hydronephrosis. Moderate calcified atherosclerotic plaque is  seen within the abdominal aorta and its branches. The urinary bladder is  minimally distended limiting evaluation. The uterus is not identified  and is likely surgically absent.     Again demonstrated is a complex lobulated hypoattenuating/cystic lesion  within the right hemipelvis with approximate measurements of 6.7 x 2.7  cm. The most medial and posterior lesion demonstrates dependent/layering  hyperdensity. The findings are not significantly changed from prior CT  and an intervening pelvic sonogram has demonstrated 4 separate cystic  lesions in this region at least one of which is mildly complex with  internal septations. Continued followup recommended.     There is no pathological retroperitoneal lymphadenopathy. There is a  small left posterolateral diaphragmatic hernia. Diffuse osteopenia is  present. Degenerative disc disease in the spine with vacuum disc  phenomena at multiple levels.       Impression:      Limited in the absence of intravenous contrast.  1. Colonic diverticulosis and constipation.  2. Stable appearance of the lobulated cystic lesions/multiseptated  lesion within the right hemipelvis adjacent to the cecum which has been  followed up by pelvic sonogram. Continued followup is recommended.     Radiation dose reduction techniques were utilized, including automated  exposure control and exposure modulation based on body size.                Current Facility-Administered Medications:   •  amLODIPine (NORVASC) tablet 10 mg, 10 mg, Oral, Daily,  Emile Morales MD  •  aspirin chewable tablet 81 mg, 81 mg, Oral, Daily, Emile Morales MD  •  atorvastatin (LIPITOR) tablet 20 mg, 20 mg, Oral, Nightly, Emile Morales MD  •  docusate sodium (COLACE) capsule 100 mg, 100 mg, Oral, BID, Emile Morales MD  •  fluticasone (FLONASE) 50 MCG/ACT nasal spray 2 spray, 2 spray, Nasal, Daily PRN, Emile Morales MD  •  morphine injection 2 mg, 2 mg, Intravenous, Q4H PRN, Emile Morales MD  •  ondansetron (ZOFRAN) injection 4 mg, 4 mg, Intravenous, Q6H PRN, Emile Morales MD  •  pantoprazole (PROTONIX) injection 40 mg, 40 mg, Intravenous, Q12H, Emile Morales MD  •  polyethylene glycol (MIRALAX) packet 17 g, 17 g, Oral, BID, Emile Morales MD  •  sodium chloride 0.9 % with KCl 20 mEq/L infusion, 75 mL/hr, Intravenous, Continuous, Emile Morales MD     ASSESSMENT  Abdominal pain with nausea vomiting  Severe constipation  Chronic diverticulosis  Chronic multiseptated lesion right pelvis  Hypertension  Hyperlipidemia  History of gastroparesis  Gastroesophageal reflux disease    PLAN  Admit  IVF  IV Protonix  Bowel program  Abdominal ultrasound  GI consult  Continue home medications  Stress ulcer DVT prophylaxis   Supportive care   Discussed with family and nursing staff   Follow closely further recommendation current hospital course    EMILE MORALES MD

## 2023-04-13 ENCOUNTER — APPOINTMENT (OUTPATIENT)
Dept: ULTRASOUND IMAGING | Facility: HOSPITAL | Age: 88
End: 2023-04-13
Payer: MEDICARE

## 2023-04-13 LAB
ALBUMIN SERPL-MCNC: 3.2 G/DL (ref 3.5–5.2)
ALBUMIN/GLOB SERPL: 2.1 G/DL
ALP SERPL-CCNC: 116 U/L (ref 39–117)
ALT SERPL W P-5'-P-CCNC: 13 U/L (ref 1–33)
ANION GAP SERPL CALCULATED.3IONS-SCNC: 4.1 MMOL/L (ref 5–15)
AST SERPL-CCNC: 18 U/L (ref 1–32)
BASOPHILS # BLD AUTO: 0.01 10*3/MM3 (ref 0–0.2)
BASOPHILS NFR BLD AUTO: 0.2 % (ref 0–1.5)
BILIRUB SERPL-MCNC: 0.7 MG/DL (ref 0–1.2)
BUN SERPL-MCNC: 6 MG/DL (ref 8–23)
BUN/CREAT SERPL: 10.2 (ref 7–25)
CALCIUM SPEC-SCNC: 9 MG/DL (ref 8.6–10.5)
CHLORIDE SERPL-SCNC: 104 MMOL/L (ref 98–107)
CHOLEST SERPL-MCNC: 89 MG/DL (ref 0–200)
CO2 SERPL-SCNC: 27.9 MMOL/L (ref 22–29)
CREAT SERPL-MCNC: 0.59 MG/DL (ref 0.57–1)
DEPRECATED RDW RBC AUTO: 38.6 FL (ref 37–54)
EGFRCR SERPLBLD CKD-EPI 2021: 88.4 ML/MIN/1.73
EOSINOPHIL # BLD AUTO: 0.01 10*3/MM3 (ref 0–0.4)
EOSINOPHIL NFR BLD AUTO: 0.2 % (ref 0.3–6.2)
ERYTHROCYTE [DISTWIDTH] IN BLOOD BY AUTOMATED COUNT: 12.1 % (ref 12.3–15.4)
GLOBULIN UR ELPH-MCNC: 1.5 GM/DL
GLUCOSE SERPL-MCNC: 86 MG/DL (ref 65–99)
HBA1C MFR BLD: 5 % (ref 4.8–5.6)
HCT VFR BLD AUTO: 30.8 % (ref 34–46.6)
HDLC SERPL-MCNC: 53 MG/DL (ref 40–60)
HGB BLD-MCNC: 10.6 G/DL (ref 12–15.9)
IMM GRANULOCYTES # BLD AUTO: 0.02 10*3/MM3 (ref 0–0.05)
IMM GRANULOCYTES NFR BLD AUTO: 0.3 % (ref 0–0.5)
LDLC SERPL CALC-MCNC: 25 MG/DL (ref 0–100)
LDLC/HDLC SERPL: 0.53 {RATIO}
LYMPHOCYTES # BLD AUTO: 1.46 10*3/MM3 (ref 0.7–3.1)
LYMPHOCYTES NFR BLD AUTO: 23.4 % (ref 19.6–45.3)
MCH RBC QN AUTO: 30.3 PG (ref 26.6–33)
MCHC RBC AUTO-ENTMCNC: 34.4 G/DL (ref 31.5–35.7)
MCV RBC AUTO: 88 FL (ref 79–97)
MONOCYTES # BLD AUTO: 0.78 10*3/MM3 (ref 0.1–0.9)
MONOCYTES NFR BLD AUTO: 12.5 % (ref 5–12)
NEUTROPHILS NFR BLD AUTO: 3.97 10*3/MM3 (ref 1.7–7)
NEUTROPHILS NFR BLD AUTO: 63.4 % (ref 42.7–76)
NRBC BLD AUTO-RTO: 0 /100 WBC (ref 0–0.2)
PLATELET # BLD AUTO: 221 10*3/MM3 (ref 140–450)
PMV BLD AUTO: 9.7 FL (ref 6–12)
POTASSIUM SERPL-SCNC: 3.9 MMOL/L (ref 3.5–5.2)
PROT SERPL-MCNC: 4.7 G/DL (ref 6–8.5)
RBC # BLD AUTO: 3.5 10*6/MM3 (ref 3.77–5.28)
SODIUM SERPL-SCNC: 136 MMOL/L (ref 136–145)
TRIGL SERPL-MCNC: 40 MG/DL (ref 0–150)
TSH SERPL DL<=0.05 MIU/L-ACNC: 1.77 UIU/ML (ref 0.27–4.2)
VLDLC SERPL-MCNC: 11 MG/DL (ref 5–40)
WBC NRBC COR # BLD: 6.25 10*3/MM3 (ref 3.4–10.8)

## 2023-04-13 PROCEDURE — 84443 ASSAY THYROID STIM HORMONE: CPT | Performed by: HOSPITALIST

## 2023-04-13 PROCEDURE — 80053 COMPREHEN METABOLIC PANEL: CPT | Performed by: HOSPITALIST

## 2023-04-13 PROCEDURE — 83036 HEMOGLOBIN GLYCOSYLATED A1C: CPT | Performed by: HOSPITALIST

## 2023-04-13 PROCEDURE — 80061 LIPID PANEL: CPT | Performed by: HOSPITALIST

## 2023-04-13 PROCEDURE — 99222 1ST HOSP IP/OBS MODERATE 55: CPT | Performed by: INTERNAL MEDICINE

## 2023-04-13 PROCEDURE — G0378 HOSPITAL OBSERVATION PER HR: HCPCS

## 2023-04-13 PROCEDURE — 85025 COMPLETE CBC W/AUTO DIFF WBC: CPT | Performed by: HOSPITALIST

## 2023-04-13 PROCEDURE — 76857 US EXAM PELVIC LIMITED: CPT

## 2023-04-13 RX ORDER — ATORVASTATIN CALCIUM 20 MG/1
10 TABLET, FILM COATED ORAL NIGHTLY
Status: DISCONTINUED | OUTPATIENT
Start: 2023-04-13 | End: 2023-04-14 | Stop reason: HOSPADM

## 2023-04-13 RX ORDER — PANTOPRAZOLE SODIUM 40 MG/1
40 TABLET, DELAYED RELEASE ORAL
Status: DISCONTINUED | OUTPATIENT
Start: 2023-04-14 | End: 2023-04-13

## 2023-04-13 RX ORDER — PANTOPRAZOLE SODIUM 40 MG/1
40 TABLET, DELAYED RELEASE ORAL
Status: DISCONTINUED | OUTPATIENT
Start: 2023-04-13 | End: 2023-04-14 | Stop reason: HOSPADM

## 2023-04-13 RX ADMIN — ATORVASTATIN CALCIUM 10 MG: 20 TABLET, FILM COATED ORAL at 20:41

## 2023-04-13 RX ADMIN — AMLODIPINE BESYLATE 10 MG: 5 TABLET ORAL at 18:01

## 2023-04-13 RX ADMIN — ASPIRIN 81 MG: 81 TABLET, CHEWABLE ORAL at 09:14

## 2023-04-13 RX ADMIN — PANTOPRAZOLE SODIUM 40 MG: 40 TABLET, DELAYED RELEASE ORAL at 17:58

## 2023-04-13 NOTE — PROGRESS NOTES
"Daily progress note    Primary care physician  Dr. Opal Watkins    Chief complaint  Doing better this morning with no new complaint and denies any abdominal pain nausea vomiting and had a BM yesterday.  Patient family at bedside.    History of present illness  87-year-old white female with history of hypertension hyperlipidemia gastroparesis and gastroesophageal reflux disease presented to [facility] emergency with abdominal pain for last few days followed by nausea vomiting and no BM for more than 1 week.  Patient denies any hematemesis fever chills chest pain shortness of breath.  Patient work-up in ER revealed constipation with abdominal pain and nausea vomiting admitted for management.     REVIEW OF SYSTEMS  Per history of present illness     PHYSICAL EXAM  Blood pressure 130/83, pulse 73, temperature 97.8 °F (36.6 °C), temperature source Oral, resp. rate 18, height 165.1 cm (65\"), weight 76.2 kg (168 lb), SpO2 95 %.    GENERAL: Awake and alert in mild discomfort    HEENT:  Unremarkable  NECK:  Supple  CV: regular rhythm, normal rate  RESPIRATORY: normal effort CTA B  ABDOMEN: nondistended, soft, mild generalized tenderness, bowel sounds present,   MUSCULOSKELETAL: no deformity  NEURO: alert, moves all extremities, follows commands  PSYCH:  calm, cooperative  SKIN: warm, dry     LAB RESULTS  Lab Results (last 24 hours)     Procedure Component Value Units Date/Time    TSH [[id number]]  (Normal) Collected: 04/13/23 0400    Specimen: Blood Updated: 04/13/23 0456     TSH 1.770 uIU/mL     Comprehensive Metabolic Panel [[id number]]  (Abnormal) Collected: 04/13/23 0400    Specimen: Blood Updated: 04/13/23 0446     Glucose 86 mg/dL      BUN 6 mg/dL      Creatinine 0.59 mg/dL      Sodium 136 mmol/L      Potassium 3.9 mmol/L      Chloride 104 mmol/L      CO2 27.9 mmol/L      Calcium 9.0 mg/dL      Total Protein 4.7 g/dL      Albumin 3.2 g/dL      ALT (SGPT) 13 U/L      AST (SGOT) 18 U/L      Alkaline Phosphatase " 116 U/L      Total Bilirubin 0.7 mg/dL      Globulin 1.5 gm/dL      A/G Ratio 2.1 g/dL      BUN/Creatinine Ratio 10.2     Anion Gap 4.1 mmol/L      eGFR 88.4 mL/min/1.73     Narrative:      GFR Normal >60  Chronic Kidney Disease <60  Kidney Failure <15    The GFR formula is only valid for adults with stable renal function between ages 18 and 70.    Lipid Panel [[id number]] Collected: 04/13/23 0400    Specimen: Blood Updated: 04/13/23 0446     Total Cholesterol 89 mg/dL      Triglycerides 40 mg/dL      HDL Cholesterol 53 mg/dL      LDL Cholesterol  25 mg/dL      VLDL Cholesterol 11 mg/dL      LDL/HDL Ratio 0.53    Narrative:      Cholesterol Reference Ranges  (U.S. Department of Health and Human Services ATP III Classifications)    Desirable          <200 mg/dL  Borderline High    200-239 mg/dL  High Risk          >240 mg/dL      Triglyceride Reference Ranges  (U.S. Department of Health and Human Services ATP III Classifications)    Normal           <150 mg/dL  Borderline High  150-199 mg/dL  High             200-499 mg/dL  Very High        >500 mg/dL    HDL Reference Ranges  (U.S. Department of Health and Human Services ATP III Classifications)    Low     <40 mg/dl (major risk factor for CHD)  High    >60 mg/dl ('negative' risk factor for CHD)        LDL Reference Ranges  (U.S. Department of Health and Human Services ATP III Classifications)    Optimal          <100 mg/dL  Near Optimal     100-129 mg/dL  Borderline High  130-159 mg/dL  High             160-189 mg/dL  Very High        >189 mg/dL    Hemoglobin A1c [[id number]]  (Normal) Collected: 04/13/23 0400    Specimen: Blood Updated: 04/13/23 0440     Hemoglobin A1C 5.00 %     Narrative:      Hemoglobin A1C Ranges:    Increased Risk for Diabetes  5.7% to 6.4%  Diabetes                     >= 6.5%  Diabetic Goal                < 7.0%    CBC & Differential [[id number]]  (Abnormal) Collected: 04/13/23 0400    Specimen: Blood Updated: 04/13/23 0429    Narrative:       The following orders were created for panel order CBC & Differential.  Procedure                               Abnormality         Status                     ---------                               -----------         ------                     CBC Auto Differential[[id number]]        Abnormal            Final result                 Please view results for these tests on the individual orders.    CBC Auto Differential [[id number]]  (Abnormal) Collected: 04/13/23 0400    Specimen: Blood Updated: 04/13/23 0429     WBC 6.25 10*3/mm3      RBC 3.50 10*6/mm3      Hemoglobin 10.6 g/dL      Hematocrit 30.8 %      MCV 88.0 fL      MCH 30.3 pg      MCHC 34.4 g/dL      RDW 12.1 %      RDW-SD 38.6 fl      MPV 9.7 fL      Platelets 221 10*3/mm3      Neutrophil % 63.4 %      Lymphocyte % 23.4 %      Monocyte % 12.5 %      Eosinophil % 0.2 %      Basophil % 0.2 %      Immature Grans % 0.3 %      Neutrophils, Absolute 3.97 10*3/mm3      Lymphocytes, Absolute 1.46 10*3/mm3      Monocytes, Absolute 0.78 10*3/mm3      Eosinophils, Absolute 0.01 10*3/mm3      Basophils, Absolute 0.01 10*3/mm3      Immature Grans, Absolute 0.02 10*3/mm3      nRBC 0.0 /100 WBC     BNP [[id number]]  (Normal) Collected: 04/12/23 2013    Specimen: Blood Updated: 04/12/23 2040     proBNP 176.0 pg/mL     Narrative:      Among patients with dyspnea, NT-proBNP is highly sensitive for the detection of acute congestive heart failure. In addition NT-proBNP of <300 pg/ml effectively rules out acute congestive heart failure with 99% negative predictive value.    Results may be falsely decreased if patient taking Biotin.          Imaging Results (Last 24 Hours)     ** No results found for the last 24 hours. **          Current Facility-Administered Medications:   •  amLODIPine (NORVASC) tablet 10 mg, 10 mg, Oral, Daily, Matt Morales MD, 10 mg at 04/12/23 1756  •  aspirin chewable tablet 81 mg, 81 mg, Oral, Daily, Matt Morales MD, 81 mg at 04/13/23 0914  •   atorvastatin (LIPITOR) tablet 20 mg, 20 mg, Oral, Nightly, Emile Morales MD, 20 mg at 04/12/23 2043  •  bisacodyl (DULCOLAX) suppository 10 mg, 10 mg, Rectal, Daily PRN, Emile Morales MD, 10 mg at 04/12/23 1757  •  docusate sodium (COLACE) capsule 100 mg, 100 mg, Oral, BID, Emile Morales MD, 100 mg at 04/12/23 2043  •  fluticasone (FLONASE) 50 MCG/ACT nasal spray 2 spray, 2 spray, Nasal, Daily PRN, Emile Morales MD  •  morphine injection 2 mg, 2 mg, Intravenous, Q4H PRN, Emile Morales MD  •  ondansetron (ZOFRAN) injection 4 mg, 4 mg, Intravenous, Q6H PRN, Emile Morales MD  •  pantoprazole (PROTONIX) injection 40 mg, 40 mg, Intravenous, Q12H, Emile Morales MD, 40 mg at 04/12/23 2043  •  polyethylene glycol (MIRALAX) packet 17 g, 17 g, Oral, BID, Emile Morales MD  •  prochlorperazine (COMPAZINE) injection 10 mg, 10 mg, Intravenous, Q4H PRN, Emile Morales MD, 10 mg at 04/12/23 1716  •  sodium chloride 0.9 % with KCl 20 mEq/L infusion, 75 mL/hr, Intravenous, Continuous, Emile Morales MD, Last Rate: 75 mL/hr at 04/12/23 2232, 75 mL/hr at 04/12/23 2232     ASSESSMENT  Abdominal pain with nausea vomiting resolved  Severe constipation resolved  Chronic diverticulosis  Chronic multiseptated lesion right pelvis  Hypertension  Hyperlipidemia  History of gastroparesis  Gastroesophageal reflux disease    PLAN  CPM  Discontinue IVF  Change Protonix to p.o.  Bowel program  GI consult appreciated  Continue home medications  Stress ulcer DVT prophylaxis   Supportive care   PT/OT  Discussed with family and nursing staff   Follow closely further recommendation current hospital course    EMILE MORALES MD    Copied text in this note has been reviewed and is accurate as of 04/13/23

## 2023-04-13 NOTE — PLAN OF CARE
Goal Outcome Evaluation:  Plan of Care Reviewed With: patient, daughter        Progress: improving  Outcome Evaluation: VSS. RA. A/Ox4.  Refused amlodipine this am stating it was the wrong dose and she takes it with Lisinopril. This was cleared up when MD saw pt. No N/V thus far today. On clear liquid diet. IVF stopped. Pelvic US done this afternoon. No results yet. Safety measures in place.

## 2023-04-13 NOTE — CASE MANAGEMENT/SOCIAL WORK
Discharge Planning Assessment  [facility]     Patient Name: Katja Klein  MRN: [id number]  Today's Date: 4/13/2023    Admit Date: 4/12/2023    Plan: Return home   Discharge Needs Assessment     Row Name 04/13/23 0057       Living Environment    People in Home alone    Current Living Arrangements home    Primary Care Provided by self    Provides Primary Care For no one    Family Caregiver if Needed child(gladis), adult    Family Caregiver Names Joanna Klein [phone] or Stephanie Kelly [phone]    Quality of Family Relationships helpful       Resource/Environmental Concerns    Resource/Environmental Concerns none    Transportation Concerns none       Food Insecurity    Within the past 12 months, you worried that your food would run out before you got the money to buy more. Never true    Within the past 12 months, the food you bought just didn't last and you didn't have money to get more. Never true       Transition Planning    Patient/Family Anticipates Transition to home    Patient/Family Anticipated Services at Transition none    Transportation Anticipated family or friend will provide       Discharge Needs Assessment    Readmission Within the Last 30 Days no previous admission in last 30 days    Equipment Currently Used at Home none    Concerns to be Addressed denies needs/concerns at this time    Anticipated Changes Related to Illness none    Equipment Needed After Discharge none               Discharge Plan     Row Name 04/13/23 9091       Plan    Plan Return home    Patient/Family in Agreement with Plan yes    Plan Comments Spoke with patient and daughter Charo Klein [phone] at bedside.  Patient lives alone, is IADL, uses no DME, has never used HH or been to SNF.  PCP is Dr. Opal Marina and pharmacy is [facility].  Patient drives, but daughter Charo or joanna Kelly [phone] will assist if needed.  [place] plans to return home at [place].  CCP will follow.  Yasmine  RN              Continued Care and Services - Admitted Since 4/12/2023    Coordination has not been started for this encounter.          Demographic Summary     Row Name 04/13/23 1316       General Information    Admission Type observation    Arrived From home    Referral Source admission list    Reason for Consult discharge planning    Preferred Language English               Functional Status     Row Name 04/13/23 1316       Functional Status    Usual Activity Tolerance moderate    Current Activity Tolerance moderate       Functional Status, IADL    Medications independent    Meal Preparation independent    Housekeeping independent    Laundry independent    Shopping independent       Mental Status    General Appearance WDL WDL       Mental Status Summary    Recent Changes in Mental Status/Cognitive Functioning no changes                      Becky S. [place], RN

## 2023-04-13 NOTE — PLAN OF CARE
Goal Outcome Evaluation:  Plan of Care Reviewed With: patient           Outcome Evaluation: vss. multiple loose BMs overnight. IVF going. on room air. NSR

## 2023-04-13 NOTE — CONSULTS
Chief Complaint   Patient presents with   • Constipation       Katja Klein is a 87 y.o. female who presents with abdominal pain, nausea and constipation    HPI  Mrs. Klein is an 87 yoF w h/o HTN who presents with abdominal pain, nausea and constipation.  She has a h/o gastroparesis and followed previously with [facility], Dr. Scales / Dr. Bower and was on domperidone at one point when we last evaluated her in 2021 when she was here with chest pain.   Work up here is notable for CT abd/pelvis wo contrast noting small HH, colonic diverticulosis, moderate to large formed stool throughout the colon as well as complex lobulated hypoattenuating cystic lesion in the right hemipelvis measuring 6.7 x 2.7 cm not significantly changed from prior CT in 2020 with intervening pelvic sonogram has demonstrated 4 separate cystic lesions in this region at least one of which is mildly complex with internal septations and were characterized as likely benign paraovarian cysts on TVUS 8/2020.  She reports she has had relieved symptoms since enema yesterday and BM.  She reports she has had chronic constipation and stopped taking miralax recently.  She reports she had not had BM in 8 days.    Past Medical History:   Diagnosis Date   • CTS (carpal tunnel syndrome)    • Hypertension    • Migraine    • Stroke        Past Surgical History:   Procedure Laterality Date   • BREAST BIOPSY     • CARPAL TUNNEL RELEASE     • CHOLECYSTECTOMY     • HYSTERECTOMY     • OOPHORECTOMY           Current Facility-Administered Medications:   •  amLODIPine (NORVASC) tablet 10 mg, 10 mg, Oral, Daily, Matt Morales MD, 10 mg at 04/12/23 1756  •  aspirin chewable tablet 81 mg, 81 mg, Oral, Daily, Matt Morales MD, 81 mg at 04/12/23 1756  •  atorvastatin (LIPITOR) tablet 20 mg, 20 mg, Oral, Nightly, Matt Morales MD, 20 mg at 04/12/23 2043  •  bisacodyl (DULCOLAX) suppository 10 mg, 10 mg, Rectal, Daily PRN, Matt Morales MD, 10 mg at 04/12/23 1757  •  docusate  sodium (COLACE) capsule 100 mg, 100 mg, Oral, BID, Matt Morales MD, 100 mg at 04/12/23 2043  •  fluticasone (FLONASE) 50 MCG/ACT nasal spray 2 spray, 2 spray, Nasal, Daily PRN, Matt Morales MD  •  morphine injection 2 mg, 2 mg, Intravenous, Q4H PRN, Matt Morales MD  •  ondansetron (ZOFRAN) injection 4 mg, 4 mg, Intravenous, Q6H PRN, Matt Morales MD  •  pantoprazole (PROTONIX) injection 40 mg, 40 mg, Intravenous, Q12H, Matt Morales MD, 40 mg at 04/12/23 2043  •  polyethylene glycol (MIRALAX) packet 17 g, 17 g, Oral, BID, Matt Morales MD  •  prochlorperazine (COMPAZINE) injection 10 mg, 10 mg, Intravenous, Q4H PRN, Matt Morales MD, 10 mg at 04/12/23 1716  •  sodium chloride 0.9 % with KCl 20 mEq/L infusion, 75 mL/hr, Intravenous, Continuous, Matt Morales MD, Last Rate: 75 mL/hr at 04/12/23 2232, 75 mL/hr at 04/12/23 2232    Allergies   Allergen Reactions   • Codeine Nausea Only       Social History     Socioeconomic History   • Marital status:    Tobacco Use   • Smoking status: Former   • Smokeless tobacco: Never   Vaping Use   • Vaping Use: Never used   Substance and Sexual Activity   • Alcohol use: No   • Drug use: No   • Sexual activity: Defer       Family History   Problem Relation Age of Onset   • Migraines Mother    • Cancer Mother    • Stroke Father    • Cancer Sister    • Migraines Sister    • Breast cancer Sister    • Breast cancer Daughter        Review of Systems   Constitutional: Negative for chills and fever.   Respiratory: Negative for cough and shortness of breath.    Gastrointestinal: Negative for abdominal distention, abdominal pain, nausea and vomiting.   Skin: Negative for color change and rash.   All other systems reviewed and are negative.      Vitals:    04/13/23 0713   BP: 107/52   Pulse: 69   Resp: 18   Temp: 99.1 °F (37.3 °C)   SpO2: 97%     PHYSICAL EXAM:   General: Patient awake, alert and cooperative   Eyes: Normal lids and lashes, no scleral icterus   Neck: Supple, normal  ROM   Skin: Warm and dry, not jaundiced   Cardiovascular: Regular rate, regular rhythm, well-perfused extremities   Pulm: Equal expansion bilaterally, no increased WOB   Abdomen: Soft, nontender, nondistended;    Extremities: No rash or edema              Neuro: A&O, o obvious sign of focal deficit   Psychiatric: Normal mood and behavior;        CT Abdomen Pelvis Without Contrast    Result Date: 4/12/2023  Limited in the absence of intravenous contrast. 1. Colonic diverticulosis and constipation. 2. Stable appearance of the lobulated cystic lesions/multiseptated lesion within the right hemipelvis adjacent to the cecum which has been followed up by pelvic sonogram. Continued followup is recommended.  Radiation dose reduction techniques were utilized, including automated exposure control and exposure modulation based on body size.         Impression:  Abdominal Pain  Nausea and Emesis  Constipation  H/o Gastroparesis  Cystic Lesion R Hemipelvis (unchanged since 2020)    Plan:  - Follows at [facility] previously with Yordy/Sathish, was on domperidone now off and had been doing well off and managing with lifestyle modification  - Anti-emetics PRN  - Continue miralax daily, discussed titration of this up or down based on symptoms, recommend daily fiber supplementation, start psyllium while here, was taking different fiber supplement at home previously.  Amlodipine can contribute to constipation and she may discuss this with her PCP  - Discussed risks/benefits of colonoscopy at some point.  After discussion with patient and daughter, patient would like to follow up with her PCP with continued titration of bowel regimen prior to considering colonoscopy and follow up with her GI at [facility]   - Advance diet as tolerated    GI will sign off and see PRN.  Please let us know if there are questions or concerns.  Jose G Corcoran MD

## 2023-04-14 VITALS
DIASTOLIC BLOOD PRESSURE: 63 MMHG | WEIGHT: 168 LBS | OXYGEN SATURATION: 94 % | SYSTOLIC BLOOD PRESSURE: 108 MMHG | TEMPERATURE: 98 F | RESPIRATION RATE: 16 BRPM | HEIGHT: 65 IN | BODY MASS INDEX: 27.99 KG/M2 | HEART RATE: 89 BPM

## 2023-04-14 LAB
ANION GAP SERPL CALCULATED.3IONS-SCNC: 9 MMOL/L (ref 5–15)
BASOPHILS # BLD AUTO: 0.02 10*3/MM3 (ref 0–0.2)
BASOPHILS NFR BLD AUTO: 0.4 % (ref 0–1.5)
BUN SERPL-MCNC: 6 MG/DL (ref 8–23)
BUN/CREAT SERPL: 12.8 (ref 7–25)
CALCIUM SPEC-SCNC: 9 MG/DL (ref 8.6–10.5)
CHLORIDE SERPL-SCNC: 105 MMOL/L (ref 98–107)
CO2 SERPL-SCNC: 24 MMOL/L (ref 22–29)
CREAT SERPL-MCNC: 0.47 MG/DL (ref 0.57–1)
DEPRECATED RDW RBC AUTO: 39.9 FL (ref 37–54)
EGFRCR SERPLBLD CKD-EPI 2021: 93.4 ML/MIN/1.73
EOSINOPHIL # BLD AUTO: 0.04 10*3/MM3 (ref 0–0.4)
EOSINOPHIL NFR BLD AUTO: 0.9 % (ref 0.3–6.2)
ERYTHROCYTE [DISTWIDTH] IN BLOOD BY AUTOMATED COUNT: 12.2 % (ref 12.3–15.4)
GLUCOSE SERPL-MCNC: 84 MG/DL (ref 65–99)
HCT VFR BLD AUTO: 33.7 % (ref 34–46.6)
HGB BLD-MCNC: 11.5 G/DL (ref 12–15.9)
IMM GRANULOCYTES # BLD AUTO: 0.02 10*3/MM3 (ref 0–0.05)
IMM GRANULOCYTES NFR BLD AUTO: 0.4 % (ref 0–0.5)
LYMPHOCYTES # BLD AUTO: 0.97 10*3/MM3 (ref 0.7–3.1)
LYMPHOCYTES NFR BLD AUTO: 21.7 % (ref 19.6–45.3)
MCH RBC QN AUTO: 30.3 PG (ref 26.6–33)
MCHC RBC AUTO-ENTMCNC: 34.1 G/DL (ref 31.5–35.7)
MCV RBC AUTO: 88.9 FL (ref 79–97)
MONOCYTES # BLD AUTO: 0.52 10*3/MM3 (ref 0.1–0.9)
MONOCYTES NFR BLD AUTO: 11.6 % (ref 5–12)
NEUTROPHILS NFR BLD AUTO: 2.9 10*3/MM3 (ref 1.7–7)
NEUTROPHILS NFR BLD AUTO: 65 % (ref 42.7–76)
NRBC BLD AUTO-RTO: 0 /100 WBC (ref 0–0.2)
PLATELET # BLD AUTO: 209 10*3/MM3 (ref 140–450)
PMV BLD AUTO: 9.9 FL (ref 6–12)
POTASSIUM SERPL-SCNC: 3.6 MMOL/L (ref 3.5–5.2)
RBC # BLD AUTO: 3.79 10*6/MM3 (ref 3.77–5.28)
SODIUM SERPL-SCNC: 138 MMOL/L (ref 136–145)
WBC NRBC COR # BLD: 4.47 10*3/MM3 (ref 3.4–10.8)

## 2023-04-14 PROCEDURE — 85025 COMPLETE CBC W/AUTO DIFF WBC: CPT | Performed by: HOSPITALIST

## 2023-04-14 PROCEDURE — 99232 SBSQ HOSP IP/OBS MODERATE 35: CPT | Performed by: INTERNAL MEDICINE

## 2023-04-14 PROCEDURE — 80048 BASIC METABOLIC PNL TOTAL CA: CPT | Performed by: HOSPITALIST

## 2023-04-14 PROCEDURE — 97162 PT EVAL MOD COMPLEX 30 MIN: CPT

## 2023-04-14 PROCEDURE — 97535 SELF CARE MNGMENT TRAINING: CPT

## 2023-04-14 PROCEDURE — G0378 HOSPITAL OBSERVATION PER HR: HCPCS

## 2023-04-14 PROCEDURE — 97165 OT EVAL LOW COMPLEX 30 MIN: CPT

## 2023-04-14 PROCEDURE — 97530 THERAPEUTIC ACTIVITIES: CPT

## 2023-04-14 RX ORDER — PSEUDOEPHEDRINE HCL 30 MG
100 TABLET ORAL 2 TIMES DAILY
Qty: 60 CAPSULE | Refills: 0 | Status: SHIPPED | OUTPATIENT
Start: 2023-04-14 | End: 2023-05-14

## 2023-04-14 RX ORDER — PSYLLIUM SEED (WITH DEXTROSE)
2 POWDER (GRAM) ORAL DAILY
Status: DISCONTINUED | OUTPATIENT
Start: 2023-04-14 | End: 2023-04-14 | Stop reason: HOSPADM

## 2023-04-14 RX ORDER — AMLODIPINE BESYLATE 10 MG/1
10 TABLET ORAL DAILY
Qty: 30 TABLET | Refills: 0 | Status: SHIPPED | OUTPATIENT
Start: 2023-04-15 | End: 2023-05-15

## 2023-04-14 RX ORDER — PANTOPRAZOLE SODIUM 40 MG/1
40 TABLET, DELAYED RELEASE ORAL
Qty: 60 TABLET | Refills: 0 | Status: SHIPPED | OUTPATIENT
Start: 2023-04-14 | End: 2023-05-14

## 2023-04-14 RX ORDER — PSYLLIUM SEED (WITH DEXTROSE)
2 POWDER (GRAM) ORAL DAILY
Qty: 60 WAFER | Refills: 0 | Status: SHIPPED | OUTPATIENT
Start: 2023-04-15 | End: 2023-05-15

## 2023-04-14 RX ADMIN — ASPIRIN 81 MG: 81 TABLET, CHEWABLE ORAL at 09:14

## 2023-04-14 RX ADMIN — DOCUSATE SODIUM 100 MG: 100 CAPSULE, LIQUID FILLED ORAL at 09:16

## 2023-04-14 RX ADMIN — PANTOPRAZOLE SODIUM 40 MG: 40 TABLET, DELAYED RELEASE ORAL at 09:14

## 2023-04-14 RX ADMIN — Medication 2 WAFER: at 13:08

## 2023-04-14 RX ADMIN — AMLODIPINE BESYLATE 10 MG: 5 TABLET ORAL at 09:14

## 2023-04-14 NOTE — DISCHARGE SUMMARY
Discharge summary    Date of admission 4/12/2023  Date of discharge 4/14/2023    Final diagnosis  Abdominal pain with nausea vomiting resolved  Severe constipation resolved  Chronic diverticulosis  Chronic multicystic lesion right pelvis  Hypertension  Hyperlipidemia  Gastroesophageal reflux disease    Discharge medications    Current Facility-Administered Medications:   •  amLODIPine (NORVASC) tablet 10 mg, 10 mg, Oral, Daily, Matt Morales MD, 10 mg at 04/14/23 0914  •  aspirin chewable tablet 81 mg, 81 mg, Oral, Daily, Matt Morales MD, 81 mg at 04/14/23 0914  •  atorvastatin (LIPITOR) tablet 10 mg, 10 mg, Oral, Nightly, Matt Morales MD, 10 mg at 04/13/23 2041  •  docusate sodium (COLACE) capsule 100 mg, 100 mg, Oral, BID, Matt Morales MD, 100 mg at 04/14/23 0916  •  Metamucil wafer 2 wafer, 2 wafer, Oral, Daily, Tanya Phillips MD, 2 wafer at 04/14/23 1308  •  pantoprazole (PROTONIX) EC tablet 40 mg, 40 mg, Oral, BID AC, Matt Morales MD, 40 mg at 04/14/23 0914  •  polyethylene glycol (MIRALAX) packet 17 g, 17 g, Oral, BID, Matt Morales MD     Consults obtained  Gastroenterology    Procedures  None    Hospital course  87-year-old white female with history of hypertension hyperlipidemia and gastroesophageal reflux disease admitted through emergency room with abdominal pain with nausea vomiting and constipation.  Patient admitted treated with bowel program and further evaluated by gastroenterology and offer colonoscopy which patient and family refused.  Patient responded to the treatment and had been good BM.  Patient also found to have chronic multicystic lesion right pelvis which is further confirmed with abdominal ultrasound and shows no change and advised to continue periodic ultrasound.  Patient cleared for discharge as she is tolerating regular diet with no abdominal pain nausea vomiting.  Patient will continue bowel program at home.  Patient blood pressure medication adjusted during this  hospitalization.    Discharge diet regular    Activity as tolerated    Medication as above    Follow-up with prime doctor in 1 week and follow-up with gastroenterology per the instructions and take medication as directed    EMILE MORENO MD

## 2023-04-14 NOTE — THERAPY EVALUATION
Patient Name: Katja Klein  : 1935    MRN: [id number]                              Today's Date: 2023       Admit Date: 2023    Visit Dx:     ICD-10-CM ICD-9-CM   1. Acute abdominal pain  R10.9 789.00     338.19   2. Nausea and vomiting, unspecified vomiting type  R11.2 787.01   3. Constipation, unspecified constipation type  K59.00 564.00     Patient Active Problem List   Diagnosis   • Stroke-like symptoms   • Cerebrovascular accident (CVA) due to embolism   • Essential hypertension   • Hyperlipidemia LDL goal <70   • Neck pain   • Chest pain in adult   • Generalized abdominal pain     Past Medical History:   Diagnosis Date   • CTS (carpal tunnel syndrome)    • Hypertension    • Migraine    • Stroke      Past Surgical History:   Procedure Laterality Date   • BREAST BIOPSY     • CARPAL TUNNEL RELEASE     • CHOLECYSTECTOMY     • HYSTERECTOMY     • OOPHORECTOMY        General Information     Row Name 23 1247          OT Time and Intention    Document Type evaluation  -     Mode of Treatment occupational therapy  -     Row Name 23 1247          General Information    Patient Profile Reviewed yes  -JW     Prior Level of Function independent:;ADL's;all household mobility  indep with ADLs and fxl mobility w/o Ad  -     Existing Precautions/Restrictions fall  -     Barriers to Rehab none identified  -     Row Name 23 1247          Living Environment    People in Home alone  -     Row Name 23 1247          Cognition    Orientation Status (Cognition) oriented x 4  -     Row Name 23 1247          Safety Issues, Functional Mobility    Impairments Affecting Function (Mobility) balance  -           User Key  (r) = Recorded By, (t) = Taken By, (c) = Cosigned By    Initials Name Provider Type     Na Hendrickson OT Occupational Therapist                 Mobility/ADL's     Row Name 23 1247          Bed Mobility    Bed Mobility supine-sit  -     Supine-Sit  [place] (Bed Mobility) verbal cues;modified independence  -JW     Row Name 04/14/23 1247          Transfers    Transfers toilet transfer;sit-stand transfer  -JW     Row Name 04/14/23 1247          Sit-Stand Transfer    Sit-Stand [place] (Transfers) standby assist;verbal cues  -JW     Row Name 04/14/23 1247          Toilet Transfer    Type (Toilet Transfer) sit-stand;stand-sit  -     [place] Level (Toilet Transfer) standby assist  -JW     Row Name 04/14/23 1247          Functional Mobility    Functional Mobility- Ind. Level standby assist  -     Functional Mobility- Comment SBA for fxl ambulation in/out of the bathroom and around nsg unit w/o AD. Mild unsteadiness occasionally but no overt LOB  -JW     Row Name 04/14/23 1247          Activities of Daily Living    BADL Assessment/Intervention grooming  demo's ability to complete all ADLs independently  -JW     Row Name 04/14/23 1247          Grooming Assessment/Training    [place] Level (Grooming) grooming skills  -     Position (Grooming) sink side;unsupported standing  -     Comment, (Grooming) completes grooming tasks standing at the sink (I)  -           User Key  (r) = Recorded By, (t) = Taken By, (c) = Cosigned By    Initials Name Provider Type    Na Coelho OT Occupational Therapist               Obj/Interventions     Row Name 04/14/23 1250          Sensory Assessment (Somatosensory)    Sensory Assessment (Somatosensory) sensation intact  -JW     Row Name 04/14/23 1250          Vision Assessment/Intervention    Visual Impairment/Limitations WNL  -JW     Row Name 04/14/23 1250          Range of Motion Comprehensive    General Range of Motion bilateral upper extremity ROM WNL  -JW     Row Name 04/14/23 1250          Strength Comprehensive (MMT)    General Manual Muscle Testing (MMT) Assessment no strength deficits identified  -JW     Row Name 04/14/23 1250          Balance    Static Standing Balance modified [place]  -      Dynamic Standing Balance standby assist  -           User Key  (r) = Recorded By, (t) = Taken By, (c) = Cosigned By    Initials Name Provider Type    Na Coelho OT Occupational Therapist               Goals/Plan     Row Name 04/14/23 1255          Transfer Goal 1 (OT)    Activity/Assistive Device (Transfer Goal 1, OT) transfers, all  -     [place] Level/Cues Needed (Transfer Goal 1, OT) independent  -     Time Frame (Transfer Goal 1, OT) short term goal (STG);2 weeks  -     Progress/Outcome (Transfer Goal 1, OT) goal met  -           User Key  (r) = Recorded By, (t) = Taken By, (c) = Cosigned By    Initials Name Provider Type    Na Coelho OT Occupational Therapist               Clinical Impression     Row Name 04/14/23 1250          Pain Assessment    Pretreatment Pain Rating 0/10 - no pain  -     Row Name 04/14/23 1250          Plan of Care Review    Plan of Care Reviewed With patient  -     Outcome Evaluation Pt is an 88 y/o F admitted with abdominal pain, constipation and nausea. She lives alone and reports being indep with ADLs and fxl mobility w/o AD. Presents today close to baseline fxn. Able to perform fxl ambulation in/out of bathroom and around nurses station w/o AD, SBA provided d/t mild unsteadiness. Completes toilet TF w/o assist and demo's ability to complete all ADLs independently at this time. No acute OT needs identified, will sign off. Pt plans to d/c home with family to assist  -     Row Name 04/14/23 1250          Therapy Assessment/Plan (OT)    Criteria for Skilled Therapeutic Interventions Met (OT) no problems identified which require skilled intervention  -     Therapy Frequency (OT) evaluation only  -     Row Name 04/14/23 1250          Therapy Plan Review/Discharge Plan (OT)    Anticipated Discharge Disposition (OT) home;home with assist  -[facility] Name 04/14/23 1250          Positioning and Restraints    Pre-Treatment Position in bed  -     Post  Treatment Position chair  -JW     In Chair notified nsg;call light within reach;encouraged to call for assist;legs elevated  -JW           User Key  (r) = Recorded By, (t) = Taken By, (c) = Cosigned By    Initials Name Provider Type    Na Coelho OT Occupational Therapist               Outcome Measures     Row Name 04/14/23 1254          How much help from another is currently needed...    Putting on and taking off regular lower body clothing? 4  -JW     Bathing (including washing, rinsing, and drying) 4  -JW     Toileting (which includes using toilet bed pan or urinal) 4  -JW     Putting on and taking off regular upper body clothing 4  -JW     Taking care of personal grooming (such as brushing teeth) 4  -JW     Eating meals 4  -JW     AM-PAC 6 Clicks Score (OT) 24  -JW     Row Name 04/14/23 1011 04/14/23 0918       How much help from another person do you currently need...    Turning from your back to your side while in flat bed without using bedrails? 4  -CB 4  -GP    Moving from lying on back to sitting on the side of a flat bed without bedrails? 3  -CB 3  -GP    Moving to and from a bed to a chair (including a wheelchair)? 3  -CB 3  -GP    Standing up from a chair using your arms (e.g., wheelchair, bedside chair)? 3  -CB 3  -GP    Climbing 3-5 steps with a railing? 3  -CB 3  -GP    To walk in hospital room? 3  -CB 3  -GP    AM-PAC 6 Clicks Score (PT) 19  -CB 19  -GP    Highest level of mobility 6 --> Walked 10 steps or more  -CB 6 --> Walked 10 steps or more  -GP    Row Name 04/14/23 1254 04/14/23 1011       Functional Assessment    Outcome Measure Options AM-PAC 6 Clicks Daily Activity (OT)  -JW AM-PAC 6 Clicks Basic Mobility (PT)  -CB          User Key  (r) = Recorded By, (t) = Taken By, (c) = Cosigned By    Initials Name Provider Type    GP Gina Boyd, RN Registered Nurse    Philomena Alvarado, PT Physical Therapist    Na Coelho OT Occupational Therapist                Occupational Therapy  Education     Title: PT OT SLP Therapies (In Progress)     Topic: Occupational Therapy (In Progress)     Point: ADL training (Done)     Description:   Instruct learner(s) on proper safety adaptation and remediation techniques during self care or transfers.   Instruct in proper use of assistive devices.              Learning Progress Summary           Patient Acceptance, E, VU by  at 4/14/2023 1254                   Point: Home exercise program (Not Started)     Description:   Instruct learner(s) on appropriate technique for monitoring, assisting and/or progressing therapeutic exercises/activities.              Learner Progress:  Not documented in this visit.          Point: Precautions (Done)     Description:   Instruct learner(s) on prescribed precautions during self-care and functional transfers.              Learning Progress Summary           Patient Acceptance, E, VU by  at 4/14/2023 1254                   Point: Body mechanics (Done)     Description:   Instruct learner(s) on proper positioning and spine alignment during self-care, functional mobility activities and/or exercises.              Learning Progress Summary           Patient Acceptance, E, VU by  at 4/14/2023 1254                               User Key     Initials Effective Dates Name Provider Type Discipline     06/10/21 -  Na Hendrickson OT Occupational Therapist OT              OT Recommendation and Plan  Therapy Frequency (OT): evaluation only  Plan of Care Review  Plan of Care Reviewed With: patient  Outcome Evaluation: Pt is an 86 y/o F admitted with abdominal pain, constipation and nausea. She lives alone and reports being indep with ADLs and fxl mobility w/o AD. Presents today close to baseline fxn. Able to perform fxl ambulation in/out of bathroom and around nurses station w/o AD, SBA provided d/t mild unsteadiness. Completes toilet TF w/o assist and demo's ability to complete all ADLs independently at this time. No acute OT needs  identified, will sign off. Pt plans to d/c home with family to assist     Time Calculation:    Time Calculation- OT     Row Name 04/14/23 1254             Time Calculation- OT    OT Start Time 0831  -JW      OT Stop Time 0850  -JW      OT Time Calculation (min) 19 min  -JW      Total Timed Code Minutes- OT 9 minute(s)  -JW      OT Received On 04/14/23  -         Timed Charges    06887 - OT Self Care/Mgmt Minutes 9  -JW         Untimed Charges    OT Eval/Re-eval Minutes 10  -JW         Total Minutes    Timed Charges Total Minutes 9  -JW      Untimed Charges Total Minutes 10  -JW       Total Minutes 19  -JW            User Key  (r) = Recorded By, (t) = Taken By, (c) = Cosigned By    Initials Name Provider Type    Na Coelho OT Occupational Therapist              Therapy Charges for Today     Code Description Service Date Service Provider Modifiers Qty    87882522687 HC OT SELF CARE/MGMT/TRAIN EA 15 MIN 4/14/2023 Na eHndrickson OT GO 1    53318862423 HC OT EVAL LOW COMPLEXITY 3 4/14/2023 Na Hendrickson OT GO 1               Na Hendrickson OT  4/14/2023

## 2023-04-14 NOTE — THERAPY EVALUATION
Patient Name: Katja Klein  : 1935    MRN: [id number]                              Today's Date: 2023       Admit Date: 2023    Visit Dx:     ICD-10-CM ICD-9-CM   1. Acute abdominal pain  R10.9 789.00     338.19   2. Nausea and vomiting, unspecified vomiting type  R11.2 787.01   3. Constipation, unspecified constipation type  K59.00 564.00     Patient Active Problem List   Diagnosis   • Stroke-like symptoms   • Cerebrovascular accident (CVA) due to embolism   • Essential hypertension   • Hyperlipidemia LDL goal <70   • Neck pain   • Chest pain in adult   • Generalized abdominal pain     Past Medical History:   Diagnosis Date   • CTS (carpal tunnel syndrome)    • Hypertension    • Migraine    • Stroke      Past Surgical History:   Procedure Laterality Date   • BREAST BIOPSY     • CARPAL TUNNEL RELEASE     • CHOLECYSTECTOMY     • HYSTERECTOMY     • OOPHORECTOMY        General Information     Row Name 23 1003          Physical Therapy Time and Intention    Document Type evaluation  -CB     Mode of Treatment physical therapy  -CB     Row Name 23 1003          General Information    Patient Profile Reviewed yes  -CB     Prior Level of Function independent:;gait;transfer;bed mobility  -CB     Existing Precautions/Restrictions fall  -CB     Barriers to Rehab none identified  -CB     Row Name 23 1003          Living Environment    People in Home alone  -CB     Row Name 23 1003          Cognition    Orientation Status (Cognition) oriented x 4  -CB     Row Name 23 1003          Safety Issues, Functional Mobility    Impairments Affecting Function (Mobility) balance;endurance/activity tolerance  -CB           User Key  (r) = Recorded By, (t) = Taken By, (c) = Cosigned By    Initials Name Provider Type    CB Philomena Gandara PT Physical Therapist               Mobility     Row Name 23 1004          Bed Mobility    Bed Mobility supine-sit  -CB     Supine-Sit [place]  (Bed Mobility) standby assist;verbal cues  -CB     Row Name 04/14/23 1004          Sit-Stand Transfer    Sit-Stand [place] (Transfers) standby assist;verbal cues  -CB     Assistive Device (Sit-Stand Transfers) --  no AD  -CB     Row Name 04/14/23 1004          Gait/Stairs (Locomotion)    [place] Level (Gait) standby assist;contact guard;verbal cues  -CB     Assistive Device (Gait) --  no AD  -CB     Distance in Feet (Gait) 120ft  -CB     Deviations/Abnormal Patterns (Gait) gait speed decreased;stride length decreased  -CB     Comment, (Gait/Stairs) minimal LOB with head turns but pt was able to self correct  -CB           User Key  (r) = Recorded By, (t) = Taken By, (c) = Cosigned By    Initials Name Provider Type    Philomena Alvarado PT Physical Therapist               Obj/Interventions     [facility] Name 04/14/23 1008          Range of Motion Comprehensive    General Range of Motion bilateral lower extremity ROM WFL  -CB     Row Name 04/14/23 1008          Strength Comprehensive (MMT)    General Manual Muscle Testing (MMT) Assessment no strength deficits identified  -CB     Row Name 04/14/23 1008          Balance    Balance Assessment standing static balance;standing dynamic balance  -CB     Static Standing Balance standby assist  -CB     Dynamic Standing Balance standby assist;contact guard  -CB     Position/Device Used, Standing Balance unsupported  -CB     Balance Interventions sitting;standing;sit to stand;supported;static;dynamic;minimal challenge  -CB     Row Name 04/14/23 1008          Sensory Assessment (Somatosensory)    Sensory Assessment (Somatosensory) sensation intact  -CB           User Key  (r) = Recorded By, (t) = Taken By, (c) = Cosigned By    Initials Name Provider Type    Philomena Alvarado, PT Physical Therapist               Goals/Plan     Row Name 04/14/23 1011          Bed Mobility Goal 1 (PT)    Activity/Assistive Device (Bed Mobility Goal 1, PT) bed mobility activities, all  -CB      [place] Level/Cues Needed (Bed Mobility Goal 1, PT) modified independence  -CB     Time Frame (Bed Mobility Goal 1, PT) long term goal (LTG);1 week  -CB     Row Name 04/14/23 1011          Transfer Goal 1 (PT)    Activity/Assistive Device (Transfer Goal 1, PT) sit-to-stand/stand-to-sit;bed-to-chair/chair-to-bed  -CB     [place] Level/Cues Needed (Transfer Goal 1, PT) modified independence  -CB     Time Frame (Transfer Goal 1, PT) long term goal (LTG);1 week  -CB     Row Name 04/14/23 1011          Gait Training Goal 1 (PT)    Activity/Assistive Device (Gait Training Goal 1, PT) gait (walking locomotion);improve balance and speed;increase endurance/gait distance  -CB     [place] Level (Gait Training Goal 1, PT) supervision required  -CB     Distance (Gait Training Goal 1, PT) 150ft  -CB     Time Frame (Gait Training Goal 1, PT) long term goal (LTG);1 week  -CB     Row Name 04/14/23 1011          Therapy Assessment/Plan (PT)    Planned Therapy Interventions (PT) balance training;bed mobility training;gait training;home exercise program;patient/family education;transfer training;strengthening  -CB           User Key  (r) = Recorded By, (t) = Taken By, (c) = Cosigned By    Initials Name Provider Type    Philomena Alvarado, PT Physical Therapist               Clinical Impression     Row Name 04/14/23 1008          Pain    Pretreatment Pain Rating 0/10 - no pain  -CB     Posttreatment Pain Rating 3/10  -CB     Pain Location lower  -CB     Pain Location - back  -CB     Pain Intervention(s) Repositioned;Rest  -CB     Row Name 04/14/23 1008          Plan of Care Review    Plan of Care Reviewed With patient  -CB     Outcome Evaluation Patient is a 88 yo female who presented with acute constipation/nausea. PMHx includes HTN and GERD. She lives alone and is ind at baseline without use of AD. Today she presents with minimal balance deficts and decreased activity tolerance. She completed bed mobility with SBA, STS  with SBA, and ambulated 120ft without AD requiring SBA/CGA. Encouraged pt to mobilize with [facility] staff. She will likely continue to benefit from skilled PT to increase level of independence. Plan home with family to assist at dc.  -CB     Row Name 04/14/23 1008          Therapy Assessment/Plan (PT)    Rehab Potential (PT) good, to achieve stated therapy goals  -CB     Criteria for Skilled Interventions Met (PT) yes  -CB     Therapy Frequency (PT) 3 times/wk  -CB     Row Name 04/14/23 1008          Positioning and Restraints    Pre-Treatment Position in bed  -CB     Post Treatment Position chair  -CB     In Chair notified nsg;reclined;call light within reach;encouraged to call for assist;legs elevated  -CB           User Key  (r) = Recorded By, (t) = Taken By, (c) = Cosigned By    Initials Name Provider Type    Philomnea Alvarado, PT Physical Therapist               Outcome Measures     Row Name 04/14/23 1011 04/14/23 0918       How much help from another person do you currently need...    Turning from your back to your side while in flat bed without using bedrails? 4  -CB 4  -GP    Moving from lying on back to sitting on the side of a flat bed without bedrails? 3  -CB 3  -GP    Moving to and from a bed to a chair (including a wheelchair)? 3  -CB 3  -GP    Standing up from a chair using your arms (e.g., wheelchair, bedside chair)? 3  -CB 3  -GP    Climbing 3-5 steps with a railing? 3  -CB 3  -GP    To walk in hospital room? 3  -CB 3  -GP    AM-PAC 6 Clicks Score (PT) 19  -CB 19  -GP    Highest level of mobility 6 --> Walked 10 steps or more  -CB 6 --> Walked 10 steps or more  -GP    Row Name 04/14/23 1011          Functional Assessment    Outcome Measure Options AM-PAC 6 Clicks Basic Mobility (PT)  -CB           User Key  (r) = Recorded By, (t) = Taken By, (c) = Cosigned By    Initials Name Provider Type    GP Gina Boyd, RN Registered Nurse    Philomena Alvarado, PT Physical Therapist                              Physical Therapy Education     Title: PT OT SLP Therapies (In Progress)     Topic: Physical Therapy (In Progress)     Point: Mobility training (Done)     Learning Progress Summary           Patient Acceptance, E,TB, VU,NR by  at 4/14/2023 1012                   Point: Home exercise program (Not Started)     Learner Progress:  Not documented in this visit.          Point: Body mechanics (Not Started)     Learner Progress:  Not documented in this visit.          Point: Precautions (Not Started)     Learner Progress:  Not documented in this visit.                      User Key     Initials Effective Dates Name Provider Type Discipline     10/22/21 -  Philomena Gandara, PT Physical Therapist PT              PT Recommendation and Plan  Planned Therapy Interventions (PT): balance training, bed mobility training, gait training, home exercise program, patient/family education, transfer training, strengthening  Plan of Care Reviewed With: patient  Outcome Evaluation: Patient is a 86 yo female who presented with acute constipation/nausea. PMHx includes HTN and GERD. She lives alone and is ind at baseline without use of AD. Today she presents with minimal balance deficts and decreased activity tolerance. She completed bed mobility with SBA, STS with SBA, and ambulated 120ft without AD requiring SBA/CGA. Encouraged pt to mobilize with [facility] staff. She will likely continue to benefit from skilled PT to increase level of independence. Plan home with family to assist at [place].     Time Calculation:    PT Charges     Row Name 04/14/23 1012             Time Calculation    Start Time 0834  -CB      Stop Time 0850  -CB      Time Calculation (min) 16 min  -CB      PT Received On 04/14/23  -CB      PT - Next Appointment 04/17/23  -CB      PT Goal Re-Cert Due Date 04/21/23  -CB         Time Calculation- PT    Total Timed Code Minutes- PT 8 minute(s)  -CB         Timed Charges    96006 - PT Therapeutic Activity Minutes 8  -CB         Total  Minutes    Timed Charges Total Minutes 8  -CB       Total Minutes 8  -CB            User Key  (r) = Recorded By, (t) = Taken By, (c) = Cosigned By    Initials Name Provider Type    CB Philomena Gandara, PT Physical Therapist              Therapy Charges for Today     Code Description Service Date Service Provider Modifiers Qty    25886128883  PT THERAPEUTIC ACT EA 15 MIN 4/14/2023 Philomena Gandara, PT GP 1    87902289403 HC PT EVAL MOD COMPLEXITY 3 4/14/2023 Philomena Gandara, PT GP 1          PT G-Codes  Outcome Measure Options: AM-PAC 6 Clicks Basic Mobility (PT)  AM-PAC 6 Clicks Score (PT): 19  PT Discharge Summary  Anticipated Discharge Disposition (PT): home with assist    Philomena Gandara PT  4/14/2023

## 2023-04-14 NOTE — CASE MANAGEMENT/SOCIAL WORK
Case Management Discharge Note      Final Note: Home; self-care. Nishi NUNEZ         Selected Continued Care - Discharged on 4/14/2023 Admission date: 4/12/2023 - Discharge disposition: Home or Self Care    Destination    No services have been selected for the patient.              Durable Medical Equipment    No services have been selected for the patient.              Dialysis/Infusion    No services have been selected for the patient.              Home Medical Care    No services have been selected for the patient.              Therapy    No services have been selected for the patient.              Community Resources    No services have been selected for the patient.              Community & DME    No services have been selected for the patient.                       Final Discharge Disposition Code: 01 - home or self-care

## 2023-04-14 NOTE — PROGRESS NOTES
"Daily progress note    Primary care physician  Dr. Opal Watkins    Chief complaint  Doing better with no new complaint and wants to go home.    History of present illness  87-year-old white female with history of hypertension hyperlipidemia gastroparesis and gastroesophageal reflux disease presented to [facility] emergency with abdominal pain for last few days followed by nausea vomiting and no BM for more than 1 week.  Patient denies any hematemesis fever chills chest pain shortness of breath.  Patient work-up in ER revealed constipation with abdominal pain and nausea vomiting admitted for management.     REVIEW OF SYSTEMS  Unremarkable     PHYSICAL EXAM  Blood pressure 108/63, pulse 89, temperature 98 °F (36.7 °C), temperature source Oral, resp. rate 16, height 165.1 cm (65\"), weight 76.2 kg (168 lb), SpO2 94 %.    GENERAL: Awake and alert in mild discomfort    HEENT:  Unremarkable  NECK:  Supple  CV: regular rhythm, normal rate  RESPIRATORY: normal effort CTA B  ABDOMEN: nondistended, soft, mild generalized tenderness, bowel sounds present,   MUSCULOSKELETAL: no deformity  NEURO: alert, moves all extremities, follows commands  PSYCH:  calm, cooperative  SKIN: warm, dry     LAB RESULTS  Lab Results (last 24 hours)     Procedure Component Value Units Date/Time    Basic Metabolic Panel [[id number]]  (Abnormal) Collected: 04/14/23 0433    Specimen: Blood Updated: 04/14/23 0533     Glucose 84 mg/dL      BUN 6 mg/dL      Creatinine 0.47 mg/dL      Sodium 138 mmol/L      Potassium 3.6 mmol/L      Chloride 105 mmol/L      CO2 24.0 mmol/L      Calcium 9.0 mg/dL      BUN/Creatinine Ratio 12.8     Anion Gap 9.0 mmol/L      eGFR 93.4 mL/min/1.73     Narrative:      GFR Normal >60  Chronic Kidney Disease <60  Kidney Failure <15    The GFR formula is only valid for adults with stable renal function between ages 18 and 70.    CBC & Differential [[id number]]  (Abnormal) Collected: 04/14/23 0433    Specimen: Blood " Updated: 04/14/23 0516    Narrative:      The following orders were created for panel order CBC & Differential.  Procedure                               Abnormality         Status                     ---------                               -----------         ------                     CBC Auto Differential[[id number]]        Abnormal            Final result                 Please view results for these tests on the individual orders.    CBC Auto Differential [[id number]]  (Abnormal) Collected: 04/14/23 0433    Specimen: Blood Updated: 04/14/23 0516     WBC 4.47 10*3/mm3      RBC 3.79 10*6/mm3      Hemoglobin 11.5 g/dL      Hematocrit 33.7 %      MCV 88.9 fL      MCH 30.3 pg      MCHC 34.1 g/dL      RDW 12.2 %      RDW-SD 39.9 fl      MPV 9.9 fL      Platelets 209 10*3/mm3      Neutrophil % 65.0 %      Lymphocyte % 21.7 %      Monocyte % 11.6 %      Eosinophil % 0.9 %      Basophil % 0.4 %      Immature Grans % 0.4 %      Neutrophils, Absolute 2.90 10*3/mm3      Lymphocytes, Absolute 0.97 10*3/mm3      Monocytes, Absolute 0.52 10*3/mm3      Eosinophils, Absolute 0.04 10*3/mm3      Basophils, Absolute 0.02 10*3/mm3      Immature Grans, Absolute 0.02 10*3/mm3      nRBC 0.0 /100 WBC         Imaging Results (Last 24 Hours)     Procedure Component Value Units Date/Time    CT Abdomen Pelvis Without Contrast [[id number]] Collected: 04/12/23 1259     Updated: 04/14/23 1010    Narrative:      CT ABDOMEN AND PELVIS WITHOUT CONTRAST     HISTORY: 87-year-old female with abdominal pain and vomiting.     TECHNIQUE: Axial CT images of the abdomen and pelvis were obtained  without administration of intravenous contrast. The patient was not  given oral contrast. Coronal and sagittal reformats were obtained.     COMPARISON: 07/20/2020     FINDINGS: Small sliding hiatal hernia is present. The small and large  bowel loops demonstrate normal caliber. Marked colonic diverticulosis is  present. No appreciable colonic wall thickening.  Moderate to large  amount of formed stool seen throughout the colon.     Noncontrast attenuation of liver is normal. The spleen is normal in  size. No intrahepatic biliary dilatation. Gallbladder is surgically  absent. The pancreas is normal without ductal dilatation. Bilateral  low-density adrenal gland nodules/adenomas are unchanged. No renal  calculi or hydronephrosis. Moderate calcified atherosclerotic plaque is  seen within the abdominal aorta and its branches. The urinary bladder is  minimally distended limiting evaluation. The uterus is not identified  and is likely surgically absent.     Again demonstrated is a complex lobulated hypoattenuating/cystic lesion  within the right hemipelvis with approximate measurements of 6.7 x 2.7  cm. The most medial and posterior lesion demonstrates dependent/layering  hyperdensity. The findings are not significantly changed from prior CT  and an intervening pelvic sonogram has demonstrated 4 separate cystic  lesions in this region at least one of which is mildly complex with  internal septations. Continued followup recommended.     There is no pathological retroperitoneal lymphadenopathy. There is a  small left posterolateral diaphragmatic hernia. Diffuse osteopenia is  present. Degenerative disc disease in the spine with vacuum disc  phenomena at multiple levels.       Impression:      Limited in the absence of intravenous contrast.  1. Colonic diverticulosis and constipation.  2. Stable appearance of the lobulated cystic lesions/multiseptated  lesion within the right hemipelvis adjacent to the cecum which has been  followed up by pelvic sonogram. Continued followup is recommended.     Radiation dose reduction techniques were utilized, including automated  exposure control and exposure modulation based on body size.     This report was finalized on 4/14/2023 10:07 AM by Dr. Last Weber M.D.        Pelvis Limited [[id number]] Collected: 04/13/23 6831     Updated:  04/13/23 1610    Narrative:      Examination: Transabdominal pelvic sonogram     TECHNIQUE: Sonographic images of the pelvis were obtained     HISTORY: Cystic mass     COMPARISON: CT of 04/12/2023 and ultrasound of 03/24/2021       Impression:      FINDINGS/impression: There is a stable multicystic lesion in the right  lower quadrant of the abdomen measuring 9.5 x 2.9 x 4 cm. On CT, the  appendix is not clearly seen separately from this lesion. Appendiceal  mucocele, lymphocele, and other etiologies remain on the differential.  Consider continued sonographic follow-up if clinically indicated.     This report was finalized on 4/13/2023 4:07 PM by Dr. Kar Alvarez M.D.             Current Facility-Administered Medications:   •  amLODIPine (NORVASC) tablet 10 mg, 10 mg, Oral, Daily, Matt Morales MD, 10 mg at 04/14/23 0914  •  aspirin chewable tablet 81 mg, 81 mg, Oral, Daily, Matt Morales MD, 81 mg at 04/14/23 0914  •  atorvastatin (LIPITOR) tablet 10 mg, 10 mg, Oral, Nightly, Matt Morales MD, 10 mg at 04/13/23 2041  •  bisacodyl (DULCOLAX) suppository 10 mg, 10 mg, Rectal, Daily PRN, Matt Morales MD, 10 mg at 04/12/23 1757  •  docusate sodium (COLACE) capsule 100 mg, 100 mg, Oral, BID, Matt Morales MD, 100 mg at 04/14/23 0916  •  fluticasone (FLONASE) 50 MCG/ACT nasal spray 2 spray, 2 spray, Nasal, Daily PRN, Matt Morales MD  •  Metamucil wafer 2 wafer, 2 wafer, Oral, Daily, Tanya Phillips MD, 2 wafer at 04/14/23 1308  •  morphine injection 2 mg, 2 mg, Intravenous, Q4H PRN, Matt Morales MD  •  ondansetron (ZOFRAN) injection 4 mg, 4 mg, Intravenous, Q6H PRN, Matt Morales MD  •  pantoprazole (PROTONIX) EC tablet 40 mg, 40 mg, Oral, BID AC, Matt Morales MD, 40 mg at 04/14/23 0914  •  polyethylene glycol (MIRALAX) packet 17 g, 17 g, Oral, BID, Matt Morales MD  •  prochlorperazine (COMPAZINE) injection 10 mg, 10 mg, Intravenous, Q4H PRN, Matt Morales MD, 10 mg at 04/12/23 1717     ASSESSMENT  Abdominal pain  with nausea vomiting resolved  Severe constipation resolved  Chronic diverticulosis  Chronic multicystic lesion right pelvis  Hypertension  Hyperlipidemia  History of gastroparesis  Gastroesophageal reflux disease    PLAN  Discharge home  Discharge summary dictated    EMILE MORENO MD    Copied text in this note has been reviewed and is accurate as of 04/14/23

## 2023-04-14 NOTE — PLAN OF CARE
Goal Outcome Evaluation:  Plan of Care Reviewed With: patient           Outcome Evaluation: Pt is an 88 y/o F admitted with abdominal pain, constipation and nausea. She lives alone and reports being indep with ADLs and fxl mobility w/o AD. Presents today close to baseline fxn. Able to perform fxl ambulation in/out of bathroom and around nurses station w/o AD, SBA provided d/t mild unsteadiness. Completes toilet TF w/o assist and demo's ability to complete all ADLs independently at this time. No acute OT needs identified, will sign off. Pt plans to d/c home with family to assist

## 2023-04-14 NOTE — PLAN OF CARE
Goal Outcome Evaluation:  Plan of Care Reviewed With: patient           Outcome Evaluation: Patient is a 88 yo female who presented with acute constipation/nausea. PMHx includes HTN and GERD. She lives alone and is ind at baseline without use of AD. Today she presents with minimal balance deficts and decreased activity tolerance. She completed bed mobility with SBA, STS with SBA, and ambulated 120ft without AD requiring SBA/CGA. Encouraged pt to mobilize with nsg staff. She will likely continue to benefit from skilled PT to increase level of independence. Plan home with family to assist at dc.

## 2023-04-14 NOTE — PROGRESS NOTES
[facility]  Inpatient Progress Note    Reason for Follow Up: Constipation and abdominal pain    Subjective     Interval History:   She feels good.  No further abdominal pain since her bowels have started to move.  She contacted the nurse yesterday and had requested inpatient colonoscopy but she since changed her mind again and wishes to pursue this as an outpatient if necessary-she wants to discuss it with her PCP.  Current Facility-Administered Medications:   •  amLODIPine (NORVASC) tablet 10 mg, 10 mg, Oral, Daily, Matt Morales MD, 10 mg at 04/14/23 0914  •  aspirin chewable tablet 81 mg, 81 mg, Oral, Daily, Matt Morales MD, 81 mg at 04/14/23 0914  •  atorvastatin (LIPITOR) tablet 10 mg, 10 mg, Oral, Nightly, Matt Morales MD, 10 mg at 04/13/23 2041  •  bisacodyl (DULCOLAX) suppository 10 mg, 10 mg, Rectal, Daily PRN, Matt Morales MD, 10 mg at 04/12/23 1757  •  docusate sodium (COLACE) capsule 100 mg, 100 mg, Oral, BID, Matt Morales MD, 100 mg at 04/14/23 0916  •  fluticasone (FLONASE) 50 MCG/ACT nasal spray 2 spray, 2 spray, Nasal, Daily PRN, Matt Morales MD  •  morphine injection 2 mg, 2 mg, Intravenous, Q4H PRN, Matt Morales MD  •  ondansetron (ZOFRAN) injection 4 mg, 4 mg, Intravenous, Q6H PRN, Matt Morales MD  •  pantoprazole (PROTONIX) EC tablet 40 mg, 40 mg, Oral, BID AC, Matt Morales MD, 40 mg at 04/14/23 0914  •  polyethylene glycol (MIRALAX) packet 17 g, 17 g, Oral, BID, Matt Morales MD  •  prochlorperazine (COMPAZINE) injection 10 mg, 10 mg, Intravenous, Q4H PRN, Matt Morales MD, 10 mg at 04/12/23 1716  Review of Systems:    The following systems were reviewed and negative;  constitution and gastrointestinal  Negative for fevers or chills    Objective     Vital Signs  Temp:  [97.4 °F (36.3 °C)-98.8 °F (37.1 °C)] 98.8 °F (37.1 °C)  Heart Rate:  [70-97] 70  Resp:  [16-18] 16  BP: (114-134)/(55-83) 114/55  Body mass index is 27.96 kg/m².    Intake/Output Summary (Last 24  hours) at 4/14/2023 1026  Last data filed at 4/14/2023 1000  Gross per 24 hour   Intake 620 ml   Output --   Net 620 ml     I/O this shift:  In: 620 [P.O.:620]  Out: -      Physical Exam:   General: patient awake, alert and cooperative   Eyes: Normal lids and lashes, no scleral icterus   Neck: supple, normal ROM   Skin: warm and dry, not jaundiced   Pulm: , regular and unlabored   Abdomen: soft, nontender, nondistended    Psychiatric: Normal mood and behavior; memory intact     Results Review:     I reviewed the patient's new clinical results.    Results from last 7 days   Lab Units 04/14/23  0433 04/13/23  0400 04/12/23  1201   WBC 10*3/mm3 4.47 6.25 8.35   HEMOGLOBIN g/dL 11.5* 10.6* 11.8*   HEMATOCRIT % 33.7* 30.8* 34.8   PLATELETS 10*3/mm3 209 221 242     Results from last 7 days   Lab Units 04/14/23  0433 04/13/23  0400 04/12/23  1201   SODIUM mmol/L 138 136 133*   POTASSIUM mmol/L 3.6 3.9 3.5   CHLORIDE mmol/L 105 104 101   CO2 mmol/L 24.0 27.9 22.0   BUN mg/dL 6* 6* 9   CREATININE mg/dL 0.47* 0.59 0.53*   CALCIUM mg/dL 9.0 9.0 9.1   BILIRUBIN mg/dL  --  0.7 0.7   ALK PHOS U/L  --  116 139*   ALT (SGPT) U/L  --  13 16   AST (SGOT) U/L  --  18 26   GLUCOSE mg/dL 84 86 134*         Lab Results   Lab Value Date/Time    LIPASE 13 04/12/2023 1201    LIPASE 15 03/06/2019 1352       Radiology:  US Pelvis Limited   Final Result   FINDINGS/impression: There is a stable multicystic lesion in the right   lower quadrant of the abdomen measuring 9.5 x 2.9 x 4 cm. On CT, the   appendix is not clearly seen separately from this lesion. Appendiceal   mucocele, lymphocele, and other etiologies remain on the differential.   Consider continued sonographic follow-up if clinically indicated.       This report was finalized on 4/13/2023 4:07 PM by MANASA DrewD.          CT Abdomen Pelvis Without Contrast   Final Result   Limited in the absence of intravenous contrast.   1. Colonic diverticulosis and constipation.   2.  Stable appearance of the lobulated cystic lesions/multiseptated   lesion within the right hemipelvis adjacent to the cecum which has been   followed up by pelvic sonogram. Continued followup is recommended.       Radiation dose reduction techniques were utilized, including automated   exposure control and exposure modulation based on body size.       This report was finalized on 4/14/2023 10:07 AM by Dr. Last Weber M.D.              Assessment & Plan     Active Hospital Problems    Diagnosis    • **Generalized abdominal pain      All problems are new to me today  Assessment:  1. Abdominal pain  2. Constipation  3. History of gastroparesis      Plan:  · He is again reconsidered and does not wish to pursue colonoscopy this admission.  Will restart low residue diet  · Continue daily MiraLAX-we have discussed titration of this medication with her based on her symptoms.  Recommend daily fiber supplementation.  · She will follow-up with her PCP to discuss whether she wishes to proceed with outpatient colonoscopy  · No further recommendations at this time-we will sign off but are available as needed    I discussed the patients findings and my recommendations with patient.    Tanya Phillips MD

## 2023-04-19 ENCOUNTER — HOSPITAL ENCOUNTER (OUTPATIENT)
Dept: BONE DENSITY | Facility: HOSPITAL | Age: 88
Discharge: HOME | End: 2023-04-19
Admitting: FAMILY MEDICINE
Payer: MEDICARE

## 2023-04-19 DIAGNOSIS — Z78.0 POSTMENOPAUSAL: ICD-10-CM

## 2023-04-19 PROCEDURE — 77080 DXA BONE DENSITY AXIAL: CPT

## 2023-10-11 ENCOUNTER — TRANSCRIBE ORDERS (OUTPATIENT)
Dept: ADMINISTRATIVE | Facility: HOSPITAL | Age: 88
End: 2023-10-11
Payer: MEDICARE

## 2023-10-31 ENCOUNTER — TRANSCRIBE ORDERS (OUTPATIENT)
Dept: ADMINISTRATIVE | Facility: HOSPITAL | Age: 88
End: 2023-10-31
Payer: MEDICARE

## 2023-10-31 DIAGNOSIS — Z12.31 VISIT FOR SCREENING MAMMOGRAM: Primary | ICD-10-CM

## 2024-06-13 ENCOUNTER — OFFICE (OUTPATIENT)
Dept: URBAN - METROPOLITAN AREA CLINIC 76 | Facility: CLINIC | Age: 89
End: 2024-06-13

## 2024-06-13 VITALS — WEIGHT: 156 LBS | HEIGHT: 65 IN

## 2024-06-13 DIAGNOSIS — K59.00 CONSTIPATION, UNSPECIFIED: ICD-10-CM

## 2024-06-13 DIAGNOSIS — K21.9 GASTRO-ESOPHAGEAL REFLUX DISEASE WITHOUT ESOPHAGITIS: ICD-10-CM

## 2024-06-13 DIAGNOSIS — K31.84 GASTROPARESIS: ICD-10-CM

## 2024-06-13 PROCEDURE — 99214 OFFICE O/P EST MOD 30 MIN: CPT | Performed by: NURSE PRACTITIONER

## 2025-05-27 NOTE — PLAN OF CARE
Preventing Falls: Care Instructions  Injuries and health problems such as trouble walking or poor eyesight can increase your risk of falling. So can some medicines. But there are things you can do to help prevent falls. You can exercise to get stronger. You can also arrange your home to make it safer.    Talk to your doctor about the medicines you take. Ask if any of them increase the risk of falls and whether they can be changed or stopped.   Try to exercise regularly. It can help improve your strength and balance. This can help lower your risk of falling.         Practice fall safety and prevention.   Wear low-heeled shoes that fit well and give your feet good support. Talk to your doctor if you have foot problems that make this hard.  Carry a cellphone or wear a medical alert device that you can use to call for help.  Use stepladders instead of chairs to reach high objects. Don't climb if you're at risk for falls. Ask for help, if needed.  Wear the correct eyeglasses, if you need them.        Make your home safer.   Remove rugs, cords, clutter, and furniture from walkways.  Keep your house well lit. Use night-lights in hallways and bathrooms.  Install and use sturdy handrails on stairways.  Wear nonskid footwear, even inside. Don't walk barefoot or in socks without shoes.        Be safe outside.   Use handrails, curb cuts, and ramps whenever possible.  Keep your hands free by using a shoulder bag or backpack.  Try to walk in well-lit areas. Watch out for uneven ground, changes in pavement, and debris.  Be careful in the winter. Walk on the grass or gravel when sidewalks are slippery. Use de-icer on steps and walkways. Add non-slip devices to shoes.    Put grab bars and nonskid mats in your shower or tub and near the toilet. Try to use a shower chair or bath bench when bathing.   Get into a tub or shower by putting in your weaker leg first. Get out with your strong side first. Have a phone or medical alert  Problem: Patient Care Overview  Goal: Plan of Care Review  Outcome: Ongoing (interventions implemented as appropriate)   08/23/18 0536   Plan of Care Review   Progress improving   OTHER   Outcome Summary patient alert and oriented x4, denies pain. right hand numbness. up with one assist, nih 1, ivf. stand by assist   Coping/Psychosocial   Plan of Care Reviewed With patient       Problem: Fall Risk (Adult)  Goal: Identify Related Risk Factors and Signs and Symptoms  Outcome: Ongoing (interventions implemented as appropriate)      Problem: Stroke (Ischemic) (Adult)  Goal: Signs and Symptoms of Listed Potential Problems Will be Absent, Minimized or Managed (Stroke)  Outcome: Ongoing (interventions implemented as appropriate)

## 2025-09-03 ENCOUNTER — OFFICE (OUTPATIENT)
Dept: URBAN - METROPOLITAN AREA CLINIC 76 | Facility: CLINIC | Age: 89
End: 2025-09-03
Payer: MEDICARE

## 2025-09-03 VITALS
DIASTOLIC BLOOD PRESSURE: 68 MMHG | SYSTOLIC BLOOD PRESSURE: 112 MMHG | OXYGEN SATURATION: 98 % | HEART RATE: 70 BPM | HEIGHT: 65 IN | WEIGHT: 148 LBS

## 2025-09-03 DIAGNOSIS — K21.9 GASTRO-ESOPHAGEAL REFLUX DISEASE WITHOUT ESOPHAGITIS: ICD-10-CM

## 2025-09-03 DIAGNOSIS — K31.84 GASTROPARESIS: ICD-10-CM

## 2025-09-03 DIAGNOSIS — K59.04 CHRONIC IDIOPATHIC CONSTIPATION: ICD-10-CM

## 2025-09-03 PROCEDURE — 99214 OFFICE O/P EST MOD 30 MIN: CPT | Performed by: STUDENT IN AN ORGANIZED HEALTH CARE EDUCATION/TRAINING PROGRAM

## 2025-09-03 RX ORDER — STOOL SOFTENER-STIMULANT LAXATIVE 50; 8.6 MG/1; MG/1
17.2 TABLET, COATED ORAL
Qty: 180 | Refills: 3 | Status: ACTIVE
Start: 2025-09-03